# Patient Record
Sex: FEMALE | Race: ASIAN | NOT HISPANIC OR LATINO | Employment: OTHER | URBAN - METROPOLITAN AREA
[De-identification: names, ages, dates, MRNs, and addresses within clinical notes are randomized per-mention and may not be internally consistent; named-entity substitution may affect disease eponyms.]

---

## 2017-10-23 ENCOUNTER — OFFICE VISIT (OUTPATIENT)
Dept: MEDICAL GROUP | Facility: PHYSICIAN GROUP | Age: 77
End: 2017-10-23
Payer: MEDICARE

## 2017-10-23 VITALS
SYSTOLIC BLOOD PRESSURE: 126 MMHG | HEART RATE: 76 BPM | BODY MASS INDEX: 21.37 KG/M2 | WEIGHT: 95 LBS | OXYGEN SATURATION: 96 % | DIASTOLIC BLOOD PRESSURE: 78 MMHG | RESPIRATION RATE: 14 BRPM | TEMPERATURE: 97.2 F | HEIGHT: 56 IN

## 2017-10-23 DIAGNOSIS — Z13.1 ENCOUNTER FOR SCREENING EXAMINATION FOR IMPAIRED GLUCOSE REGULATION AND DIABETES MELLITUS: ICD-10-CM

## 2017-10-23 DIAGNOSIS — Z23 NEED FOR VACCINATION: ICD-10-CM

## 2017-10-23 DIAGNOSIS — Z12.39 SCREENING FOR BREAST CANCER: ICD-10-CM

## 2017-10-23 DIAGNOSIS — Z91.81 RISK FOR FALLS: ICD-10-CM

## 2017-10-23 DIAGNOSIS — R05.9 COUGH: ICD-10-CM

## 2017-10-23 DIAGNOSIS — Z13.220 SCREENING FOR LIPID DISORDERS: ICD-10-CM

## 2017-10-23 DIAGNOSIS — Z12.11 SCREEN FOR COLON CANCER: ICD-10-CM

## 2017-10-23 PROCEDURE — 90662 IIV NO PRSV INCREASED AG IM: CPT | Performed by: NURSE PRACTITIONER

## 2017-10-23 PROCEDURE — G0008 ADMIN INFLUENZA VIRUS VAC: HCPCS | Performed by: NURSE PRACTITIONER

## 2017-10-23 PROCEDURE — 99204 OFFICE O/P NEW MOD 45 MIN: CPT | Mod: 25 | Performed by: NURSE PRACTITIONER

## 2017-10-23 RX ORDER — FLUTICASONE PROPIONATE 50 MCG
1 SPRAY, SUSPENSION (ML) NASAL DAILY
Qty: 16 G | Refills: 2 | Status: SHIPPED | OUTPATIENT
Start: 2017-10-23 | End: 2017-10-23

## 2017-10-23 RX ORDER — LORATADINE 10 MG/1
10 TABLET ORAL DAILY
Qty: 30 TAB | Refills: 0 | Status: SHIPPED | OUTPATIENT
Start: 2017-10-23 | End: 2017-10-23

## 2017-10-23 RX ORDER — FLUTICASONE PROPIONATE 50 MCG
1 SPRAY, SUSPENSION (ML) NASAL DAILY
Qty: 16 G | Refills: 2 | Status: SHIPPED | OUTPATIENT
Start: 2017-10-23 | End: 2017-11-29

## 2017-10-23 RX ORDER — LORATADINE 10 MG/1
10 TABLET ORAL DAILY
Qty: 30 TAB | Refills: 0 | Status: SHIPPED | OUTPATIENT
Start: 2017-10-23 | End: 2017-11-29

## 2017-10-23 ASSESSMENT — PATIENT HEALTH QUESTIONNAIRE - PHQ9: CLINICAL INTERPRETATION OF PHQ2 SCORE: 0

## 2017-10-23 NOTE — PROGRESS NOTES
Chief Complaint   Patient presents with   • Establish Care   • Cough     since september       HISTORY OF THE PRESENT ILLNESS: This is a 77 y.o. female new patient to our practice. This pleasant patient is here today to discuss cough.    Cough  This is a new problem. Started with a cough in 2017. States cough is worse in the morning. Was seen by multiple specialist in the [facility]. No burning in her chest, no n/v, sometimes night sweats. When she got sick initially she had fever which resolved after 3 days. tmax was 38. Feels like phlegm is stuck. States the mucous is sometimes thin/thick but is noticing that it is grey in color. States breathing is improving denies wheezing, tightness in her chest, does notice SOB with walking long distances. Was diagnosed with allergic pharyngitis in [facility]. Cough syrup improves symptoms, nothing makes it worse.       Past Medical History:   Diagnosis Date   • Chronic cough    • Falls    • Knee pain, right        History reviewed. No pertinent surgical history.    Family Status   Relation Status   • Mother    • Father      Family History   Problem Relation Age of Onset   • Asthma Father        Social History   Substance Use Topics   • Smoking status: Never Smoker   • Smokeless tobacco: Never Used   • Alcohol use No       Allergies: Review of patient's allergies indicates no known allergies.    Current Outpatient Prescriptions Ordered in [facility]   Medication Sig Dispense Refill   • Dextromethorphan Polistirex (COUGH DM PO) Take  by mouth.     • fluticasone (FLONASE) 50 MCG/ACT nasal spray Spray 1 Spray in nose every day. 16 g 2   • loratadine (CLARITIN) 10 MG Tab Take 1 Tab by mouth every day. 30 Tab 0     No current Epic-ordered facility-administered medications on file.        Review of Systems   Constitutional:  Negative for fever, chills, weight loss and malaise/fatigue.   HENT:  Negative for ear pain, nosebleeds, congestion, sore throat and neck  "pain.    Eyes:  Negative for blurred vision.   Respiratory: Positive for cough, sputum production, negative shortness of breath and wheezing.    Cardiovascular:  Negative for chest pain, palpitations, orthopnea and leg swelling.   Gastrointestinal:  Negative for heartburn, nausea, vomiting and abdominal pain.   Genitourinary:  Negative for dysuria, urgency and frequency.   Musculoskeletal:  Negative for myalgias, back pain and joint pain.   Skin:  Negative for rash and itching.   Neurological:  Negative for dizziness, tingling, tremors, sensory change, focal weakness and headaches.   Endo/Heme/Allergies:  Does not bruise/bleed easily.   Psychiatric/Behavioral:  Negative for depression, anxiety, or memory loss.     All other systems reviewed and are negative except as in HPI.    Exam: Blood pressure 126/78, pulse 76, temperature 36.2 °C (97.2 °F), resp. rate 14, height 1.422 m (4' 8\"), weight 43.1 kg (95 lb), SpO2 96 %, not currently breastfeeding.  General:  Normal appearing. No distress.  HEENT:  Normocephalic. Eyes conjunctiva clear lids without ptosis, pupils equal and reactive to light accommodation, ears normal shape and contour, canals are clear bilaterally, tympanic membranes are benign, nasal mucosa benign, oropharynx is without erythema, edema or exudates.   Neck:  Supple. Thyroid is not enlarged.  Pulmonary:  Clear to ausculation.  Normal effort. No rales, ronchi, or wheezing.  Cardiovascular: Regular rate and rhythm without murmur. Carotid and radial pulses are intact and equal bilaterally.  Abdomen: Soft, nontender, nondistended. Normal bowel sounds. Liver and spleen are not palpable  Neurologic: Grossly nonfocal  Lymph:  No cervical, supraclavicular or axillary lymph nodes are palpable  Skin:  Warm and dry.  No obvious lesions.  Musculoskeletal:  Normal gait. No extremity cyanosis, clubbing, or edema.  Psych:  Normal mood and affect. Alert and oriented x3. Judgment and insight is normal.    PLAN:    1. " "Cough  States \"something\" was noted on a CT. Diagnosed with allergies in the [facility] plan to treat for this an re-check in 1 month.  - CBC WITH DIFFERENTIAL; Future  - Quantiferon Gold TB (PPD); Future    2. Screening for lipid disorders  - LIPID PROFILE; Future    3. Encounter for screening examination for impaired glucose regulation and diabetes mellitus  - COMP METABOLIC PANEL; Future    4. Risk for falls  - Patient identified as fall risk.  Appropriate orders and counseling given.    5. Need for vaccination  - INFLUENZA VACCINE, HIGH DOSE (65+ ONLY)    6. Screening for breast cancer  - MA-SCREEN MAMMO W/CAD-BILAT    7. Screen for colon cancer  - REFERRAL TO GI FOR COLONOSCOPY    Follow-up in one month for short, with myself or Dr. Freeman. Patient is encouraged to be seen in the emergency room for chest pain, palpitations, shortness of breath, dizziness, severe abdominal pain or other concerning symptoms.    Please note that this dictation was created using voice recognition software. I have made every reasonable attempt to correct obvious errors, but I expect that there are errors of grammar and possibly content that I did not discover before finalizing the note.      Assessment/Plan  1. Cough  CBC WITH DIFFERENTIAL    Quantiferon Gold TB (PPD)    DISCONTINUED: fluticasone (FLONASE) 50 MCG/ACT nasal spray    DISCONTINUED: loratadine (CLARITIN) 10 MG Tab   2. Screening for lipid disorders  LIPID PROFILE   3. Encounter for screening examination for impaired glucose regulation and diabetes mellitus  COMP METABOLIC PANEL   4. Risk for falls  Patient identified as fall risk.  Appropriate orders and counseling given.   5. Need for vaccination  INFLUENZA VACCINE, HIGH DOSE (65+ ONLY)   6. Screening for breast cancer  MA-SCREEN MAMMO W/CAD-BILAT   7. Screen for colon cancer  REFERRAL TO GI FOR COLONOSCOPY         I have placed the below orders and discussed them with an approved delegating provider. The MA is " performing the below orders under the direction of Dr. Freeman.

## 2017-10-23 NOTE — ASSESSMENT & PLAN NOTE
This is a new problem. Started with a cough in September 2017. States cough is worse in the morning. Was seen by multiple specialist in the [facility]. No burning in her chest, no n/v, sometimes night sweats. When she got sick initially she had fever which resolved after 3 days. tmax was 38. Feels like phlegm is stuck. States the mucous is sometimes thin/thick but is noticing that it is grey in color. States breathing is improving denies wheezing, tightness in her chest, does notice SOB with walking long distances. Was diagnosed with allergic pharyngitis in [facility]. Cough syrup improves symptoms, nothing makes it worse.

## 2017-11-11 ENCOUNTER — OFFICE VISIT (OUTPATIENT)
Dept: URGENT CARE | Facility: PHYSICIAN GROUP | Age: 77
End: 2017-11-11
Payer: MEDICARE

## 2017-11-11 ENCOUNTER — HOSPITAL ENCOUNTER (OUTPATIENT)
Dept: RADIOLOGY | Facility: MEDICAL CENTER | Age: 77
DRG: 853 | End: 2017-11-11
Attending: FAMILY MEDICINE
Payer: MEDICARE

## 2017-11-11 VITALS
HEIGHT: 56 IN | WEIGHT: 93 LBS | HEART RATE: 100 BPM | BODY MASS INDEX: 20.92 KG/M2 | SYSTOLIC BLOOD PRESSURE: 104 MMHG | OXYGEN SATURATION: 90 % | TEMPERATURE: 97.4 F | RESPIRATION RATE: 12 BRPM | DIASTOLIC BLOOD PRESSURE: 57 MMHG

## 2017-11-11 DIAGNOSIS — R05.9 COUGH: ICD-10-CM

## 2017-11-11 DIAGNOSIS — R06.02 SHORTNESS OF BREATH: ICD-10-CM

## 2017-11-11 DIAGNOSIS — J18.9 PNEUMONIA OF BOTH LUNGS DUE TO INFECTIOUS ORGANISM, UNSPECIFIED PART OF LUNG: ICD-10-CM

## 2017-11-11 PROCEDURE — 71020 DX-CHEST-2 VIEWS: CPT

## 2017-11-11 PROCEDURE — 99214 OFFICE O/P EST MOD 30 MIN: CPT | Performed by: FAMILY MEDICINE

## 2017-11-11 RX ORDER — DOXYCYCLINE HYCLATE 100 MG
100 TABLET ORAL 2 TIMES DAILY
Qty: 20 TAB | Refills: 0 | Status: ON HOLD | OUTPATIENT
Start: 2017-11-11 | End: 2017-11-24

## 2017-11-11 ASSESSMENT — ENCOUNTER SYMPTOMS
SORE THROAT: 0
CONSTIPATION: 1
SENSORY CHANGE: 0
BLOOD IN STOOL: 0
NAUSEA: 0
VOMITING: 0
MYALGIAS: 0
EYE DISCHARGE: 0
HEADACHES: 0
CHILLS: 0
DIARRHEA: 0
FEVER: 0
PALPITATIONS: 0
FOCAL WEAKNESS: 0
EYE REDNESS: 0
WEIGHT LOSS: 0

## 2017-11-11 ASSESSMENT — PAIN SCALES - GENERAL: PAINLEVEL: 8=MODERATE-SEVERE PAIN

## 2017-11-11 NOTE — PROGRESS NOTES
"Subjective:      Irina Conrad is a 77 y.o. female who presents with Cough (x5 day); Nasal Congestion (x5 days); Other (hard stool x5 days); and Chest Pain (chest pain due to hard coughing and flem)            5 days productive cough without blood in sputum. Subjective fever/chills. Some relief with OTC advil. +SOB. No wheeze. Mild CP with cough. No PMH pneumonia/asthma/copd. +nasal congestion. Sx's worse in am. No other aggravating or alleviating factors.          Review of Systems   Constitutional: Positive for malaise/fatigue. Negative for chills, fever and weight loss.   HENT: Negative for sore throat.    Eyes: Negative for discharge and redness.   Respiratory:        As above     Cardiovascular: Negative for palpitations and leg swelling.   Gastrointestinal: Positive for constipation. Negative for blood in stool, diarrhea, melena, nausea and vomiting.   Genitourinary: Negative for dysuria, frequency, hematuria and urgency.   Musculoskeletal: Negative for joint pain and myalgias.   Skin: Negative for itching and rash.   Neurological: Negative for sensory change, focal weakness and headaches.   .  Medications, Allergies, and current problem list reviewed today in [facility]  Past Medical History:   Diagnosis Date   • Chronic cough    • Falls    • Knee pain, right      Family History   Problem Relation Age of Onset   • Asthma Father      Social History   Substance Use Topics   • Smoking status: Never Smoker   • Smokeless tobacco: Never Used   • Alcohol use No          Objective:     /57   Pulse 100   Temp 36.3 °C (97.4 °F)   Resp 12   Ht 1.422 m (4' 8\")   Wt 42.2 kg (93 lb)   SpO2 90%   Breastfeeding? No   BMI 20.85 kg/m²      Physical Exam   Constitutional: She appears well-developed and well-nourished. No distress.   HENT:   Head: Normocephalic and atraumatic.   Mouth/Throat: Oropharynx is clear and moist.   Eyes: Conjunctivae are normal.   Cardiovascular: Normal rate, regular rhythm and normal heart " sounds.    No murmur heard.  Pulmonary/Chest: Effort normal. She has no wheezes. She has rales (throughout).   Abdominal: Soft. Bowel sounds are normal. There is no tenderness.   Neurological:   Speech is clear. Patient is appropriate and cooperative.     Skin: Skin is warm and dry. No rash noted.               Assessment/Plan:     Pulse ox adequate but borderline  cxr per rad:  1.  Patchy interstitial and alveolar opacities in both lungs, likely inflammatory.  2.  Focal opacity RIGHT lung apex may represent mass or focal inflammatory process.  3.  Tuberculosis is not excluded.    1. Cough  DX-CHEST-2 VIEWS   2. Shortness of breath  DX-CHEST-2 VIEWS   3. Pneumonia of both lungs due to infectious organism, unspecified part of lung  doxycycline (VIBRAMYCIN) 100 MG Tab     Discussed inpatient vs outpatient treatment and risk with patient and family. I offered to transfer to hospital however they prefer trial of outpatient treatment first.     Differential diagnosis, natural history, supportive care, and indications for immediate follow-up discussed at length.     She had sputum testing negative for TB in [facility] 9/2017. She has had no exposures and I will treat this as CAP and not a TB at this point.

## 2017-11-13 ENCOUNTER — HOSPITAL ENCOUNTER (INPATIENT)
Facility: MEDICAL CENTER | Age: 77
LOS: 11 days | DRG: 853 | End: 2017-11-24
Attending: EMERGENCY MEDICINE | Admitting: INTERNAL MEDICINE
Payer: MEDICARE

## 2017-11-13 ENCOUNTER — RESOLUTE PROFESSIONAL BILLING HOSPITAL PROF FEE (OUTPATIENT)
Dept: HOSPITALIST | Facility: MEDICAL CENTER | Age: 77
End: 2017-11-13
Payer: MEDICARE

## 2017-11-13 ENCOUNTER — APPOINTMENT (OUTPATIENT)
Dept: RADIOLOGY | Facility: MEDICAL CENTER | Age: 77
DRG: 853 | End: 2017-11-13
Payer: MEDICARE

## 2017-11-13 ENCOUNTER — APPOINTMENT (OUTPATIENT)
Dept: RADIOLOGY | Facility: MEDICAL CENTER | Age: 77
DRG: 853 | End: 2017-11-13
Attending: EMERGENCY MEDICINE
Payer: MEDICARE

## 2017-11-13 DIAGNOSIS — J18.9 PNEUMONIA DUE TO INFECTIOUS ORGANISM, UNSPECIFIED LATERALITY, UNSPECIFIED PART OF LUNG: ICD-10-CM

## 2017-11-13 PROBLEM — A41.9 SEPSIS (HCC): Status: ACTIVE | Noted: 2017-11-13

## 2017-11-13 LAB
ALBUMIN SERPL BCP-MCNC: 3.6 G/DL (ref 3.2–4.9)
ALBUMIN/GLOB SERPL: 0.8 G/DL
ALP SERPL-CCNC: 149 U/L (ref 30–99)
ALT SERPL-CCNC: 36 U/L (ref 2–50)
ANION GAP SERPL CALC-SCNC: 12 MMOL/L (ref 0–11.9)
AST SERPL-CCNC: 30 U/L (ref 12–45)
BASOPHILS # BLD AUTO: 0.2 % (ref 0–1.8)
BASOPHILS # BLD: 0.05 K/UL (ref 0–0.12)
BILIRUB SERPL-MCNC: 0.4 MG/DL (ref 0.1–1.5)
BUN SERPL-MCNC: 23 MG/DL (ref 8–22)
CALCIUM SERPL-MCNC: 9.6 MG/DL (ref 8.5–10.5)
CHLORIDE SERPL-SCNC: 96 MMOL/L (ref 96–112)
CO2 SERPL-SCNC: 23 MMOL/L (ref 20–33)
CREAT SERPL-MCNC: 0.99 MG/DL (ref 0.5–1.4)
EOSINOPHIL # BLD AUTO: 0.19 K/UL (ref 0–0.51)
EOSINOPHIL NFR BLD: 0.9 % (ref 0–6.9)
ERYTHROCYTE [DISTWIDTH] IN BLOOD BY AUTOMATED COUNT: 42.2 FL (ref 35.9–50)
GFR SERPL CREATININE-BSD FRML MDRD: 55 ML/MIN/1.73 M 2
GLOBULIN SER CALC-MCNC: 4.7 G/DL (ref 1.9–3.5)
GLUCOSE SERPL-MCNC: 142 MG/DL (ref 65–99)
HCT VFR BLD AUTO: 33.2 % (ref 37–47)
HGB BLD-MCNC: 11 G/DL (ref 12–16)
IMM GRANULOCYTES # BLD AUTO: 0.15 K/UL (ref 0–0.11)
IMM GRANULOCYTES NFR BLD AUTO: 0.7 % (ref 0–0.9)
LACTATE BLD-SCNC: 1.8 MMOL/L (ref 0.5–2)
LACTATE BLD-SCNC: 2.2 MMOL/L (ref 0.5–2)
LIPASE SERPL-CCNC: 24 U/L (ref 11–82)
LYMPHOCYTES # BLD AUTO: 1.55 K/UL (ref 1–4.8)
LYMPHOCYTES NFR BLD: 7.7 % (ref 22–41)
MCH RBC QN AUTO: 30.1 PG (ref 27–33)
MCHC RBC AUTO-ENTMCNC: 33.1 G/DL (ref 33.6–35)
MCV RBC AUTO: 90.7 FL (ref 81.4–97.8)
MONOCYTES # BLD AUTO: 1.07 K/UL (ref 0–0.85)
MONOCYTES NFR BLD AUTO: 5.3 % (ref 0–13.4)
NEUTROPHILS # BLD AUTO: 17.16 K/UL (ref 2–7.15)
NEUTROPHILS NFR BLD: 85.2 % (ref 44–72)
NRBC # BLD AUTO: 0 K/UL
NRBC BLD AUTO-RTO: 0 /100 WBC
PLATELET # BLD AUTO: 617 K/UL (ref 164–446)
PMV BLD AUTO: 8.7 FL (ref 9–12.9)
POTASSIUM SERPL-SCNC: 3.4 MMOL/L (ref 3.6–5.5)
PROT SERPL-MCNC: 8.3 G/DL (ref 6–8.2)
RBC # BLD AUTO: 3.66 M/UL (ref 4.2–5.4)
SODIUM SERPL-SCNC: 131 MMOL/L (ref 135–145)
WBC # BLD AUTO: 20.2 K/UL (ref 4.8–10.8)

## 2017-11-13 PROCEDURE — 96375 TX/PRO/DX INJ NEW DRUG ADDON: CPT

## 2017-11-13 PROCEDURE — 80053 COMPREHEN METABOLIC PANEL: CPT

## 2017-11-13 PROCEDURE — 71010 DX-CHEST-PORTABLE (1 VIEW): CPT

## 2017-11-13 PROCEDURE — 770027 HCHG ROOM/CARE - NEGATIVE PRESSURE ISOLATION

## 2017-11-13 PROCEDURE — 85025 COMPLETE CBC W/AUTO DIFF WBC: CPT

## 2017-11-13 PROCEDURE — 96365 THER/PROPH/DIAG IV INF INIT: CPT

## 2017-11-13 PROCEDURE — 99223 1ST HOSP IP/OBS HIGH 75: CPT | Mod: AI | Performed by: INTERNAL MEDICINE

## 2017-11-13 PROCEDURE — 83605 ASSAY OF LACTIC ACID: CPT

## 2017-11-13 PROCEDURE — 700111 HCHG RX REV CODE 636 W/ 250 OVERRIDE (IP): Performed by: EMERGENCY MEDICINE

## 2017-11-13 PROCEDURE — 36415 COLL VENOUS BLD VENIPUNCTURE: CPT

## 2017-11-13 PROCEDURE — 87040 BLOOD CULTURE FOR BACTERIA: CPT | Mod: 91

## 2017-11-13 PROCEDURE — 700105 HCHG RX REV CODE 258: Performed by: EMERGENCY MEDICINE

## 2017-11-13 PROCEDURE — 83690 ASSAY OF LIPASE: CPT

## 2017-11-13 PROCEDURE — 99285 EMERGENCY DEPT VISIT HI MDM: CPT

## 2017-11-13 RX ORDER — ACETAMINOPHEN 325 MG/1
650 TABLET ORAL EVERY 6 HOURS PRN
Status: DISCONTINUED | OUTPATIENT
Start: 2017-11-13 | End: 2017-11-24 | Stop reason: HOSPADM

## 2017-11-13 RX ORDER — CEFTRIAXONE 2 G/1
2 INJECTION, POWDER, FOR SOLUTION INTRAMUSCULAR; INTRAVENOUS ONCE
Status: COMPLETED | OUTPATIENT
Start: 2017-11-13 | End: 2017-11-13

## 2017-11-13 RX ORDER — SODIUM CHLORIDE AND POTASSIUM CHLORIDE 150; 900 MG/100ML; MG/100ML
INJECTION, SOLUTION INTRAVENOUS CONTINUOUS
Status: DISCONTINUED | OUTPATIENT
Start: 2017-11-13 | End: 2017-11-18

## 2017-11-13 RX ORDER — ONDANSETRON 4 MG/1
4 TABLET, ORALLY DISINTEGRATING ORAL EVERY 4 HOURS PRN
Status: DISCONTINUED | OUTPATIENT
Start: 2017-11-13 | End: 2017-11-24 | Stop reason: HOSPADM

## 2017-11-13 RX ORDER — SODIUM CHLORIDE 9 MG/ML
30 INJECTION, SOLUTION INTRAVENOUS ONCE
Status: COMPLETED | OUTPATIENT
Start: 2017-11-13 | End: 2017-11-13

## 2017-11-13 RX ORDER — SODIUM CHLORIDE 9 MG/ML
500 INJECTION, SOLUTION INTRAVENOUS
Status: DISCONTINUED | OUTPATIENT
Start: 2017-11-13 | End: 2017-11-24 | Stop reason: HOSPADM

## 2017-11-13 RX ORDER — SUCRALFATE 1 G/1
1 TABLET ORAL EVERY 6 HOURS
Status: DISCONTINUED | OUTPATIENT
Start: 2017-11-14 | End: 2017-11-24 | Stop reason: HOSPADM

## 2017-11-13 RX ORDER — AMOXICILLIN 250 MG
2 CAPSULE ORAL 2 TIMES DAILY
Status: DISCONTINUED | OUTPATIENT
Start: 2017-11-13 | End: 2017-11-24 | Stop reason: HOSPADM

## 2017-11-13 RX ORDER — ONDANSETRON 2 MG/ML
INJECTION INTRAMUSCULAR; INTRAVENOUS
Status: COMPLETED
Start: 2017-11-13 | End: 2017-11-13

## 2017-11-13 RX ORDER — AZITHROMYCIN 500 MG/1
500 INJECTION, POWDER, LYOPHILIZED, FOR SOLUTION INTRAVENOUS ONCE
Status: COMPLETED | OUTPATIENT
Start: 2017-11-13 | End: 2017-11-13

## 2017-11-13 RX ORDER — ONDANSETRON 2 MG/ML
4 INJECTION INTRAMUSCULAR; INTRAVENOUS ONCE
Status: COMPLETED | OUTPATIENT
Start: 2017-11-13 | End: 2017-11-13

## 2017-11-13 RX ORDER — MORPHINE SULFATE 4 MG/ML
INJECTION, SOLUTION INTRAMUSCULAR; INTRAVENOUS
Status: COMPLETED
Start: 2017-11-13 | End: 2017-11-13

## 2017-11-13 RX ORDER — POLYETHYLENE GLYCOL 3350 17 G/17G
1 POWDER, FOR SOLUTION ORAL
Status: DISCONTINUED | OUTPATIENT
Start: 2017-11-13 | End: 2017-11-24 | Stop reason: HOSPADM

## 2017-11-13 RX ORDER — LORATADINE 10 MG/1
10 TABLET ORAL DAILY
Status: DISCONTINUED | OUTPATIENT
Start: 2017-11-14 | End: 2017-11-24 | Stop reason: HOSPADM

## 2017-11-13 RX ORDER — MORPHINE SULFATE 4 MG/ML
4 INJECTION, SOLUTION INTRAMUSCULAR; INTRAVENOUS ONCE
Status: COMPLETED | OUTPATIENT
Start: 2017-11-13 | End: 2017-11-13

## 2017-11-13 RX ORDER — LABETALOL HYDROCHLORIDE 5 MG/ML
10 INJECTION, SOLUTION INTRAVENOUS EVERY 4 HOURS PRN
Status: DISCONTINUED | OUTPATIENT
Start: 2017-11-13 | End: 2017-11-24 | Stop reason: HOSPADM

## 2017-11-13 RX ORDER — OMEPRAZOLE 20 MG/1
20 CAPSULE, DELAYED RELEASE ORAL DAILY
Status: DISCONTINUED | OUTPATIENT
Start: 2017-11-14 | End: 2017-11-14

## 2017-11-13 RX ORDER — SODIUM CHLORIDE 9 MG/ML
30 INJECTION, SOLUTION INTRAVENOUS
Status: DISCONTINUED | OUTPATIENT
Start: 2017-11-13 | End: 2017-11-13

## 2017-11-13 RX ORDER — FLUTICASONE PROPIONATE 50 MCG
1 SPRAY, SUSPENSION (ML) NASAL DAILY
Status: DISCONTINUED | OUTPATIENT
Start: 2017-11-14 | End: 2017-11-24 | Stop reason: HOSPADM

## 2017-11-13 RX ORDER — ONDANSETRON 2 MG/ML
4 INJECTION INTRAMUSCULAR; INTRAVENOUS EVERY 4 HOURS PRN
Status: DISCONTINUED | OUTPATIENT
Start: 2017-11-13 | End: 2017-11-24 | Stop reason: HOSPADM

## 2017-11-13 RX ORDER — BISACODYL 10 MG
10 SUPPOSITORY, RECTAL RECTAL
Status: DISCONTINUED | OUTPATIENT
Start: 2017-11-13 | End: 2017-11-24 | Stop reason: HOSPADM

## 2017-11-13 RX ADMIN — MORPHINE SULFATE 4 MG: 4 INJECTION INTRAVENOUS at 21:30

## 2017-11-13 RX ADMIN — CEFTRIAXONE SODIUM 2 G: 2 INJECTION, POWDER, FOR SOLUTION INTRAMUSCULAR; INTRAVENOUS at 20:35

## 2017-11-13 RX ADMIN — ONDANSETRON 4 MG: 2 INJECTION INTRAMUSCULAR; INTRAVENOUS at 21:30

## 2017-11-13 RX ADMIN — AZITHROMYCIN MONOHYDRATE 500 MG: 500 INJECTION, POWDER, LYOPHILIZED, FOR SOLUTION INTRAVENOUS at 20:35

## 2017-11-13 RX ADMIN — SODIUM CHLORIDE 1302 ML: 9 INJECTION, SOLUTION INTRAVENOUS at 20:35

## 2017-11-13 ASSESSMENT — PAIN SCALES - GENERAL
PAINLEVEL_OUTOF10: 4
PAINLEVEL_OUTOF10: 0

## 2017-11-13 ASSESSMENT — COPD QUESTIONNAIRES
DO YOU EVER COUGH UP ANY MUCUS OR PHLEGM?: NO/ONLY WITH OCCASIONAL COLDS OR INFECTIONS
HAVE YOU SMOKED AT LEAST 100 CIGARETTES IN YOUR ENTIRE LIFE: NO/DON'T KNOW
DURING THE PAST 4 WEEKS HOW MUCH DID YOU FEEL SHORT OF BREATH: NONE/LITTLE OF THE TIME
COPD SCREENING SCORE: 2

## 2017-11-13 ASSESSMENT — LIFESTYLE VARIABLES: EVER_SMOKED: NEVER

## 2017-11-14 PROBLEM — D64.9 ANEMIA: Status: ACTIVE | Noted: 2017-11-14

## 2017-11-14 LAB
ANION GAP SERPL CALC-SCNC: 6 MMOL/L (ref 0–11.9)
APPEARANCE UR: CLEAR
BACTERIA #/AREA URNS HPF: NEGATIVE /HPF
BASOPHILS # BLD AUTO: 0.2 % (ref 0–1.8)
BASOPHILS # BLD: 0.04 K/UL (ref 0–0.12)
BILIRUB UR QL STRIP.AUTO: NEGATIVE
BUN SERPL-MCNC: 16 MG/DL (ref 8–22)
CALCIUM SERPL-MCNC: 8.2 MG/DL (ref 8.5–10.5)
CHLORIDE SERPL-SCNC: 105 MMOL/L (ref 96–112)
CO2 SERPL-SCNC: 24 MMOL/L (ref 20–33)
COLOR UR: YELLOW
CREAT SERPL-MCNC: 0.65 MG/DL (ref 0.5–1.4)
EOSINOPHIL # BLD AUTO: 0.11 K/UL (ref 0–0.51)
EOSINOPHIL NFR BLD: 0.6 % (ref 0–6.9)
EPI CELLS #/AREA URNS HPF: ABNORMAL /HPF
ERYTHROCYTE [DISTWIDTH] IN BLOOD BY AUTOMATED COUNT: 42.3 FL (ref 35.9–50)
FERRITIN SERPL-MCNC: 195.8 NG/ML (ref 10–291)
GFR SERPL CREATININE-BSD FRML MDRD: >60 ML/MIN/1.73 M 2
GLUCOSE SERPL-MCNC: 105 MG/DL (ref 65–99)
GLUCOSE UR STRIP.AUTO-MCNC: NEGATIVE MG/DL
HCT VFR BLD AUTO: 27.6 % (ref 37–47)
HGB BLD-MCNC: 9 G/DL (ref 12–16)
HYALINE CASTS #/AREA URNS LPF: ABNORMAL /LPF
IMM GRANULOCYTES # BLD AUTO: 0.18 K/UL (ref 0–0.11)
IMM GRANULOCYTES NFR BLD AUTO: 1 % (ref 0–0.9)
IRON SATN MFR SERPL: 6 % (ref 15–55)
IRON SERPL-MCNC: 13 UG/DL (ref 40–170)
KETONES UR STRIP.AUTO-MCNC: NEGATIVE MG/DL
LEUKOCYTE ESTERASE UR QL STRIP.AUTO: ABNORMAL
LYMPHOCYTES # BLD AUTO: 1.94 K/UL (ref 1–4.8)
LYMPHOCYTES NFR BLD: 10.6 % (ref 22–41)
MAGNESIUM SERPL-MCNC: 1.6 MG/DL (ref 1.5–2.5)
MCH RBC QN AUTO: 29.8 PG (ref 27–33)
MCHC RBC AUTO-ENTMCNC: 32.6 G/DL (ref 33.6–35)
MCV RBC AUTO: 91.4 FL (ref 81.4–97.8)
MICRO URNS: ABNORMAL
MONOCYTES # BLD AUTO: 1.02 K/UL (ref 0–0.85)
MONOCYTES NFR BLD AUTO: 5.6 % (ref 0–13.4)
NEUTROPHILS # BLD AUTO: 14.95 K/UL (ref 2–7.15)
NEUTROPHILS NFR BLD: 82 % (ref 44–72)
NITRITE UR QL STRIP.AUTO: NEGATIVE
NRBC # BLD AUTO: 0 K/UL
NRBC BLD AUTO-RTO: 0 /100 WBC
PH UR STRIP.AUTO: 5.5 [PH]
PLATELET # BLD AUTO: 502 K/UL (ref 164–446)
PMV BLD AUTO: 9.1 FL (ref 9–12.9)
POTASSIUM SERPL-SCNC: 3.9 MMOL/L (ref 3.6–5.5)
PROCALCITONIN SERPL-MCNC: 0.31 NG/ML
PROT UR QL STRIP: NEGATIVE MG/DL
RBC # BLD AUTO: 3.02 M/UL (ref 4.2–5.4)
RBC # URNS HPF: ABNORMAL /HPF
RBC UR QL AUTO: ABNORMAL
SODIUM SERPL-SCNC: 135 MMOL/L (ref 135–145)
SP GR UR STRIP.AUTO: 1.01
TIBC SERPL-MCNC: 217 UG/DL (ref 250–450)
UROBILINOGEN UR STRIP.AUTO-MCNC: 0.2 MG/DL
WBC # BLD AUTO: 18.2 K/UL (ref 4.8–10.8)
WBC #/AREA URNS HPF: ABNORMAL /HPF

## 2017-11-14 PROCEDURE — 700111 HCHG RX REV CODE 636 W/ 250 OVERRIDE (IP): Performed by: HOSPITALIST

## 2017-11-14 PROCEDURE — 85025 COMPLETE CBC W/AUTO DIFF WBC: CPT

## 2017-11-14 PROCEDURE — 87086 URINE CULTURE/COLONY COUNT: CPT

## 2017-11-14 PROCEDURE — 87070 CULTURE OTHR SPECIMN AEROBIC: CPT

## 2017-11-14 PROCEDURE — A9270 NON-COVERED ITEM OR SERVICE: HCPCS | Performed by: INTERNAL MEDICINE

## 2017-11-14 PROCEDURE — 700111 HCHG RX REV CODE 636 W/ 250 OVERRIDE (IP): Performed by: INTERNAL MEDICINE

## 2017-11-14 PROCEDURE — 700101 HCHG RX REV CODE 250: Performed by: INTERNAL MEDICINE

## 2017-11-14 PROCEDURE — 99232 SBSQ HOSP IP/OBS MODERATE 35: CPT | Performed by: HOSPITALIST

## 2017-11-14 PROCEDURE — 87116 MYCOBACTERIA CULTURE: CPT

## 2017-11-14 PROCEDURE — 84145 PROCALCITONIN (PCT): CPT

## 2017-11-14 PROCEDURE — 83550 IRON BINDING TEST: CPT

## 2017-11-14 PROCEDURE — 770027 HCHG ROOM/CARE - NEGATIVE PRESSURE ISOLATION

## 2017-11-14 PROCEDURE — 86480 TB TEST CELL IMMUN MEASURE: CPT

## 2017-11-14 PROCEDURE — 700101 HCHG RX REV CODE 250: Performed by: HOSPITALIST

## 2017-11-14 PROCEDURE — 83540 ASSAY OF IRON: CPT

## 2017-11-14 PROCEDURE — 87205 SMEAR GRAM STAIN: CPT

## 2017-11-14 PROCEDURE — 83735 ASSAY OF MAGNESIUM: CPT

## 2017-11-14 PROCEDURE — 80048 BASIC METABOLIC PNL TOTAL CA: CPT

## 2017-11-14 PROCEDURE — 36415 COLL VENOUS BLD VENIPUNCTURE: CPT

## 2017-11-14 PROCEDURE — 81001 URINALYSIS AUTO W/SCOPE: CPT

## 2017-11-14 PROCEDURE — 700102 HCHG RX REV CODE 250 W/ 637 OVERRIDE(OP): Performed by: HOSPITALIST

## 2017-11-14 PROCEDURE — 87015 SPECIMEN INFECT AGNT CONCNTJ: CPT

## 2017-11-14 PROCEDURE — 700102 HCHG RX REV CODE 250 W/ 637 OVERRIDE(OP): Performed by: INTERNAL MEDICINE

## 2017-11-14 PROCEDURE — 87206 SMEAR FLUORESCENT/ACID STAI: CPT

## 2017-11-14 PROCEDURE — A9270 NON-COVERED ITEM OR SERVICE: HCPCS | Performed by: HOSPITALIST

## 2017-11-14 PROCEDURE — 82728 ASSAY OF FERRITIN: CPT

## 2017-11-14 RX ORDER — AZITHROMYCIN 250 MG/1
250 TABLET, FILM COATED ORAL DAILY
Status: COMPLETED | OUTPATIENT
Start: 2017-11-14 | End: 2017-11-17

## 2017-11-14 RX ORDER — FAMOTIDINE 20 MG/1
20 TABLET, FILM COATED ORAL 2 TIMES DAILY
Status: DISCONTINUED | OUTPATIENT
Start: 2017-11-14 | End: 2017-11-24 | Stop reason: HOSPADM

## 2017-11-14 RX ORDER — POLYETHYLENE GLYCOL 3350 17 G/17G
1 POWDER, FOR SOLUTION ORAL DAILY
Status: DISCONTINUED | OUTPATIENT
Start: 2017-11-14 | End: 2017-11-24 | Stop reason: HOSPADM

## 2017-11-14 RX ADMIN — OMEPRAZOLE 20 MG: 20 CAPSULE, DELAYED RELEASE ORAL at 08:45

## 2017-11-14 RX ADMIN — POTASSIUM CHLORIDE AND SODIUM CHLORIDE: 900; 150 INJECTION, SOLUTION INTRAVENOUS at 08:45

## 2017-11-14 RX ADMIN — AZITHROMYCIN 250 MG: 250 TABLET, FILM COATED ORAL at 16:36

## 2017-11-14 RX ADMIN — ENOXAPARIN SODIUM 40 MG: 100 INJECTION SUBCUTANEOUS at 08:45

## 2017-11-14 RX ADMIN — SUCRALFATE 1 G: 1 TABLET ORAL at 17:18

## 2017-11-14 RX ADMIN — POTASSIUM CHLORIDE AND SODIUM CHLORIDE: 900; 150 INJECTION, SOLUTION INTRAVENOUS at 17:24

## 2017-11-14 RX ADMIN — SUCRALFATE 1 G: 1 TABLET ORAL at 00:16

## 2017-11-14 RX ADMIN — SUCRALFATE 1 G: 1 TABLET ORAL at 05:25

## 2017-11-14 RX ADMIN — LORATADINE 10 MG: 10 TABLET ORAL at 08:45

## 2017-11-14 RX ADMIN — FAMOTIDINE 20 MG: 20 TABLET, FILM COATED ORAL at 21:28

## 2017-11-14 RX ADMIN — STANDARDIZED SENNA CONCENTRATE AND DOCUSATE SODIUM 2 TABLET: 8.6; 5 TABLET, FILM COATED ORAL at 11:53

## 2017-11-14 RX ADMIN — CEFTRIAXONE 1 G: 1 INJECTION, SOLUTION INTRAVENOUS at 21:29

## 2017-11-14 RX ADMIN — SUCRALFATE 1 G: 1 TABLET ORAL at 11:53

## 2017-11-14 RX ADMIN — STANDARDIZED SENNA CONCENTRATE AND DOCUSATE SODIUM 2 TABLET: 8.6; 5 TABLET, FILM COATED ORAL at 21:29

## 2017-11-14 RX ADMIN — POTASSIUM CHLORIDE AND SODIUM CHLORIDE: 900; 150 INJECTION, SOLUTION INTRAVENOUS at 00:17

## 2017-11-14 RX ADMIN — FAMOTIDINE 20 MG: 20 TABLET, FILM COATED ORAL at 11:53

## 2017-11-14 RX ADMIN — POLYETHYLENE GLYCOL 3350 1 PACKET: 17 POWDER, FOR SOLUTION ORAL at 11:53

## 2017-11-14 RX ADMIN — SUCRALFATE 1 G: 1 TABLET ORAL at 23:33

## 2017-11-14 RX ADMIN — ACETAMINOPHEN 650 MG: 325 TABLET, FILM COATED ORAL at 23:33

## 2017-11-14 ASSESSMENT — PATIENT HEALTH QUESTIONNAIRE - PHQ9
SUM OF ALL RESPONSES TO PHQ QUESTIONS 1-9: 0
1. LITTLE INTEREST OR PLEASURE IN DOING THINGS: NOT AT ALL
SUM OF ALL RESPONSES TO PHQ9 QUESTIONS 1 AND 2: 0
2. FEELING DOWN, DEPRESSED, IRRITABLE, OR HOPELESS: NOT AT ALL

## 2017-11-14 ASSESSMENT — COGNITIVE AND FUNCTIONAL STATUS - GENERAL
DRESSING REGULAR LOWER BODY CLOTHING: A LITTLE
SUGGESTED CMS G CODE MODIFIER DAILY ACTIVITY: CK
WALKING IN HOSPITAL ROOM: A LITTLE
SUGGESTED CMS G CODE MODIFIER MOBILITY: CJ
DRESSING REGULAR UPPER BODY CLOTHING: A LITTLE
PERSONAL GROOMING: A LITTLE
MOBILITY SCORE: 21
STANDING UP FROM CHAIR USING ARMS: A LITTLE
CLIMB 3 TO 5 STEPS WITH RAILING: A LITTLE
DAILY ACTIVITIY SCORE: 18
HELP NEEDED FOR BATHING: A LITTLE
TOILETING: A LITTLE
EATING MEALS: A LITTLE

## 2017-11-14 ASSESSMENT — ENCOUNTER SYMPTOMS
HEARTBURN: 0
NECK PAIN: 0
WEAKNESS: 0
NAUSEA: 1
DEPRESSION: 0
CARDIOVASCULAR NEGATIVE: 1
ABDOMINAL PAIN: 1
BRUISES/BLEEDS EASILY: 0
PALPITATIONS: 0
FOCAL WEAKNESS: 0
FEVER: 1
BACK PAIN: 0
POLYDIPSIA: 0
COUGH: 1
EYES NEGATIVE: 1
NEUROLOGICAL NEGATIVE: 1
MUSCULOSKELETAL NEGATIVE: 1
PSYCHIATRIC NEGATIVE: 1
DIZZINESS: 0
SPUTUM PRODUCTION: 1
SHORTNESS OF BREATH: 1
HEADACHES: 0
VOMITING: 0

## 2017-11-14 ASSESSMENT — PAIN SCALES - GENERAL
PAINLEVEL_OUTOF10: 0

## 2017-11-14 ASSESSMENT — LIFESTYLE VARIABLES
ALCOHOL_USE: NO
EVER_SMOKED: NEVER

## 2017-11-14 NOTE — PROGRESS NOTES
Report received from NOELLE Hanson.   Pt. Arrived to floor at approximately 2345 via [facility]   Telemetry monitor in place and monitor room notified. Pt. Currently running   Pt. Orders and charts reviewed  Call light within reach, bed locked and in lowest position.   Pt. is AOx4, with daughter at bedside and states no further needs at this time.

## 2017-11-14 NOTE — H&P
CHIEF COMPLAINT:  Cough, shortness breath, fever.    HISTORY OF PRESENT ILLNESS:  This is a 77-year-old female without much past   medical history except for allergies.  She was vacationing in the [facility]   from 05/2017 to 10/2017, and in September while in the [facility], she started to feel ill with   intermittent low grade fever and chills, along with dry cough.  She also   started to notice intermittent shortness of breath.  She denied any night   sweats.  She sought medical attention there, where chest x-ray and sputum AFB   were obtained, which were reportedly both negative.  Her symptoms were felt to be   allergies and she was started on antihistamines.  [place] came back to the [place] in   [place], but she continued to have above-mentioned symptoms, with intermittent   fevers and chills, with cough becoming productive of white to greenish phlegm   without any blood.  She also continued to feel short of breath, but not any   worse than when it first started.  She also complained of chest pain with   coughing.  She denied any nausea, vomiting, diarrhea, or leg edema.  She did   have intermittent epigastric pain.  During that time, her appetite was down,   and she was not eating or drinking much.    She was brought by her daughter to an urgent care center last Saturday, and   chest x-ray apparently showed pneumonia.  She was started on oral doxycycline.    However, her symptoms persisted, and she felt more fatigued prompting her daughter to   bring her to the ED for further evaluation and management.    EMERGENCY DEPARTMENT COURSE:  The patient was initially evaluated in the   emergency department, was maintaining good hemodynamics, but tachycardic,   saturating well on room air, and was afebrile.  Initial blood workup showed   WBC count of 20,200, with a platelet count of 617.  BMP was notable for sodium   of 131 and potassium 3.4, with alkaline phosphatase of 149 and lactate of   2.2.  Liver function test was  normal.  Chest x-ray (my read) showed patchy   bilateral pulmonary opacifications most prominent in the right apex.  Patient   was given normal saline bolus, and repeat lactate improved to 1.8.  She was   given ceftriaxone and azithromycin in the ED.  She was subsequently admitted   to the hospitalist service.    REVIEW OF SYSTEMS  A complete review of system was done. All other systems were negative.    PMH/PSH/FMH: I personally reviewed all ancillary histories as noted.    PAST MEDICAL HISTORY:  Past Medical History:   Diagnosis Date   • Chronic cough    • Falls    • Knee pain, right        PAST SURGICAL HISTORY:  No past surgical history on file.    PERSONAL/SOCIAL HISTORY:  Social History   Substance Use Topics   • Smoking status: Never Smoker   • Smokeless tobacco: Never Used   • Alcohol use No       FAMILY MEDICAL HISTORY:  Family History   Problem Relation Age of Onset   • Asthma Father        ALLERGIES:  Review of patient's allergies indicates no known allergies.    HOME MEDICATIONS:  Prior to Admission medications    Medication Sig Start Date End Date Taking? Authorizing Provider   doxycycline (VIBRAMYCIN) 100 MG Tab Take 1 Tab by mouth 2 times a day for 10 days. 11/11/17 11/21/17  Dami Link M.D.   fluticasone (FLONASE) 50 MCG/ACT nasal spray Spray 1 Spray in nose every day. 10/23/17   Jose Najera, A.P.R.N.   loratadine (CLARITIN) 10 MG Tab Take 1 Tab by mouth every day. 10/23/17   Jose Najera, A.P.R.N.           PHYSICAL EXAMINATION:  VITAL SIGNS:  Blood pressure 136/71, heart rate 101, respiratory rate 16,   oxygen saturation 94% on room air and temperature 37.3 degrees Celsius.  CONSTITUTIONAL: (-) diaphoresis, (-) distress  HENT:  Dry lips and oral mucosa.  No tonsillopharyngeal congestion or exudates.  EYES: PERRLA, pink conjuctivae, (-) icteric sclerae  NECK: (-) cervical lymphadenopathy, (-) neck rigidity  RESPIRATORY:  Diminished air entry, bilateral lung bases, with  occasional   subtle crackles bilaterally.  CARDIOVASCULAR:  Tachycardic, regular rhythm.  No murmurs, rubs, or gallops.    (+) Trace lower extremity edema bilaterally.  GASTROINTESTINAL:  Normoactive bowel sounds, soft, (+) slight epigastric   tenderness without any guarding or rebound.  No peritoneal signs.  MUSCULOSKELETAL: (-) joint swelling/tenderness, (-) joint deformities, (-) muscle tenderness,   (-) gross limitation of movement of 4 extremities  SKIN: (-) erythema, warmth, rashes, ulcers, open wounds  PSYCHIATRIC: mood, affect, and thought content WNL, behavior age appropriate  NEUROLOGIC: Non-focal, moves all 4 extremities, sensory grossly intact      PERTINENT DIAGNOSTIC RESULTS:  Reviewed, and as mentioned above. Please refer to ED course.      ASSESSMENT:  1.  Sepsis secondary to pneumonia, failed outpatient antibiotics.  2.  Bilateral lung infiltrates, likely community-acquired pneumonia, rule out   pulmonary tuberculosis with travel risk factors.  3.  Hypokalemia.  4.  Thrombocytosis, likely reactive.  5.  Epigastric pain/tenderness, suspect gastritis.  6.  Hypovolemic hyponatremia.    PLAN:  ---  I will admit her to the telemetry unit.  I anticipate that she will need   at least 2 midnights hospital stay to provide medically necessary services.  ---  I will continue her on antibiotics for community-acquired pneumonia with   IV ceftriaxone and azithromycin.  I will send for procalcitonin and trend   this.  I will also send for sputum gram stain and culture.  She does have   significant travel history to a TB endemic area, and with her symptoms occurring   since 1 month ago, we will need to rule out pulmonary tuberculosis.  I will   send for sputum AFB x3, and QuantiFERON Gold testing.  If positive, she will   need to be on antimycobacterial therapy, and need ID consultation.  I will put   her on droplet and airborne precautions for now until PTB is ruled out.  We   will trend her WBC count.  I will  also send for blood cultures x2 sets.  ---  I will continue her on IV fluids per sepsis protocol, with normal saline   plus 20 mEq of KCl at 125 mL per hour.  I will send for a lactate level, and   trend this, with p.r.n. IV fluid boluses.  We will closely monitor her   hemodynamics on telemetry.  ---  I will replace her potassium and the IV fluids as above.  Check magnesium   level and replace if low.  Repeat BMP in the morning.  ---  We will trend her platelet count with IV fluids and antibiotics.  I   suspect that this is reactive.  Repeat CBC in the morning.  ---  I will start her on a trial of oral Prilosec daily, along with Carafate   for her possible gastritis.  ---  We will follow her sodium level with IV fluids.      Deep venous thrombosis prophylaxis -- Lovenox subcutaneously.  Gastrointestinal prophylaxis -- PPI.  Code status -- full code.       ____________________________________     LILLIE Sol / YARELY    DD:  11/14/2017 00:14:20  DT:  11/14/2017 00:40:11    D#:  [id number]  Job#:  [id number]

## 2017-11-14 NOTE — CARE PLAN
Problem: Safety  Goal: Will remain free from injury  Outcome: PROGRESSING AS EXPECTED  Vicky Chen Fall Risk Assessment:     Last Known Fall: Within the last six months  Mobility: Dizziness/generalized weakness  Medications: No meds  Mental Status/LOC/Awareness: Awake, alert, and oriented to date, place, and person  Toileting Needs: No needs  Volume/Electrolyte Status: Use of IV fluids/tube feeds  Communication/Sensory: Visual (Glasses)/hearing deficit, Non-English patient/unable to speak/slurred speech  Behavior: Appropriate behavior  Vicky Chen Fall Risk Total: 11  Fall Risk Level: MODERATE RISK    Universal Fall Precautions:  call light/belongings in reach, bed in low position and locked, siderails up x 2    Fall Risk Level Interventions:    TRIAL (TELE 8, NEURO, MED EMIL 5) Moderate Fall Risk Interventions  Place yellow fall risk ID band on patient: verified  Provide patient/family education based on risk assessment : completed  Educate patient/family to call staff for assistance when getting out of bed: completed  Place fall precaution signage outside patient door: verified  Utilize bed/chair fall alarm: completed     Patient Specific Interventions:     Medication: review medications with patient and family  Mental Status/LOC/Awareness: reinforce falls education and encourage family to stay with patient  Toileting: provide frquent toileting  Volume/Electrolyte Status: ensure patient remains hydrated  Communication/Sensory: update plan of care on whiteboard  Behavioral: encourage patient to voice feelings and engage patient in daily activities  Mobility: ensure bed is locked and in lowest position     Problem: Knowledge Deficit  Goal: Knowledge of disease process/condition, treatment plan, diagnostic tests, and medications will improve  Outcome: PROGRESSING AS EXPECTED

## 2017-11-14 NOTE — PROGRESS NOTES
[facility] Hospitalist Progress Note    Date of Service: 2017    Chief Complaint  77 y.o. female admitted 2017 with O/p Dx'd possible pneumonia/ Allergic Rxn  Admitted with PNA and r/o TB    Interval Problem Update  Patient seen and examined today.    Patient tolerating treatment and therapies.  All Data, Medication data reviewed.  Case discussed with nursing as available.  Plan of Care reviewed with patient and notified of changes.   Pt feels better, less fever, stil sob and rales, c/o epigastric pain and constipation    Consultants/Specialty  N    Disposition  TBA        Review of Systems   Constitutional: Positive for fever.   HENT: Negative.    Eyes: Negative.    Respiratory: Positive for cough, sputum production and shortness of breath.    Cardiovascular: Negative.  Negative for palpitations.   Gastrointestinal: Positive for abdominal pain and nausea. Negative for heartburn and vomiting.   Genitourinary: Negative.  Negative for dysuria and frequency.   Musculoskeletal: Negative.  Negative for back pain and neck pain.   Skin: Negative.  Negative for itching and rash.   Neurological: Negative.  Negative for dizziness, focal weakness, weakness and headaches.   Endo/Heme/Allergies: Negative.  Negative for polydipsia. Does not bruise/bleed easily.   Psychiatric/Behavioral: Negative.  Negative for depression.   All other systems reviewed and are negative.     Physical Exam  Laboratory/Imaging   Hemodynamics  Temp (24hrs), Av.1 °C (98.7 °F), Min:36.3 °C (97.3 °F), Max:37.4 °C (99.4 °F)   Temperature: 37.4 °C (99.4 °F)  Pulse  Av  Min: 85  Max: 114 Heart Rate (Monitored): (!) 102  Blood Pressure : 116/73, NIBP: 119/62      Respiratory      Respiration: 18, Pulse Oximetry: 99 %, O2 Daily Delivery Respiratory : Room Air with O2 Available     Work Of Breathing / Effort: Mild  RUL Breath Sounds: Expiratory Wheezes, RML Breath Sounds: Diminished, RLL Breath Sounds: Diminished, RENEE Breath Sounds:  Expiratory Wheezes, LLL Breath Sounds: Diminished    Fluids    Intake/Output Summary (Last 24 hours) at 11/14/17 1512  Last data filed at 11/14/17 1400   Gross per 24 hour   Intake              840 ml   Output              200 ml   Net              640 ml       Nutrition  Orders Placed This Encounter   Procedures   • Diet Order     Standing Status:   Standing     Number of Occurrences:   1     Order Specific Question:   Diet:     Answer:   Regular [1]     Physical Exam   Constitutional: She is oriented to person, place, and time. She appears well-developed and well-nourished.   HENT:   Head: Normocephalic and atraumatic.   Nose: Nose normal.   Mouth/Throat: Oropharynx is clear and moist.   Eyes: Conjunctivae and EOM are normal. Pupils are equal, round, and reactive to light.   Neck: Normal range of motion. Neck supple. No JVD present. No thyromegaly present.   Cardiovascular: Normal rate, regular rhythm and normal heart sounds.  Exam reveals no gallop and no friction rub.    Pulmonary/Chest: Effort normal. She has rales.   Abdominal: Soft. Bowel sounds are normal. She exhibits no distension and no mass. There is tenderness. There is no rebound and no guarding.   Musculoskeletal: Normal range of motion. She exhibits no edema or tenderness.   Lymphadenopathy:     She has no cervical adenopathy.   Neurological: She is alert and oriented to person, place, and time. No cranial nerve deficit.   Skin: Skin is warm and dry. She is not diaphoretic.   Psychiatric: She has a normal mood and affect. Her behavior is normal.   Nursing note and vitals reviewed.      Recent Labs      11/13/17 1740 11/14/17   0239   WBC  20.2*  18.2*   RBC  3.66*  3.02*   HEMOGLOBIN  11.0*  9.0*   HEMATOCRIT  33.2*  27.6*   MCV  90.7  91.4   MCH  30.1  29.8   MCHC  33.1*  32.6*   RDW  42.2  42.3   PLATELETCT  617*  502*   MPV  8.7*  9.1     Recent Labs      11/13/17   1740  11/14/17   0239   SODIUM  131*  135   POTASSIUM  3.4*  3.9   CHLORIDE  96   105   CO2  23  24   GLUCOSE  142*  105*   BUN  23*  16   CREATININE  0.99  0.65   CALCIUM  9.6  8.2*                      Assessment/Plan     Anemia- (present on admission)   Assessment & Plan    Unclear cause        Sepsis (CMS-HCC)- (present on admission)   Assessment & Plan    Mild/simple, c/w measures        Community acquired pneumonia- (present on admission)   Assessment & Plan    Unclear pathogen  Travel through [facility]  eval for TB and atypicals            Reviewed items::  Medications reviewed, Labs reviewed and Radiology images reviewed  Wilson catheter::  No Wilson  DVT prophylaxis pharmacological::  Enoxaparin (Lovenox)  DVT prophylaxis - mechanical:  SCDs  Antibiotics:  Treating active infection/contamination beyond 24 hours perioperative coverage      Plan  C/w emp. Tx  eval for anemia  R/o TB f/o  Resp. Care  See orders

## 2017-11-14 NOTE — PROGRESS NOTES
Received report and met with patient at bedside. Discussed plan of care for the day. IVF infusing, pt in SR. Denies any needs at this time. Bed locked & in lowest position, call bell and side table within reach.

## 2017-11-14 NOTE — PROGRESS NOTES
2 RN Skin check done with NOELLE Louise  Pt. Bilateral ears slightly red and blanching, bilateral feet and legs dry/flaky, sacrum/coccyx red and blanching. All other skin intact

## 2017-11-14 NOTE — ED PROVIDER NOTES
ED Provider Note    Scribed for Dr. Cesar Franco M.D. by Sienna Woodson. 11/13/2017  8:02 PM    Primary care provider: GUCCI Chappell  Means of arrival: Walk-in  History obtained from: Patient, family member  History limited by: None    CHIEF COMPLAINT  Chief Complaint   Patient presents with   • Cough   • Shortness of Breath       HPI  Irina Conrad is a 77 y.o. female who presents to the Emergency Department for worsened intermittent cough, shortness of breath, upper abdominal pains, and fever onset a month ago. Per family member, the patient began developing symptoms a month ago after returning back from the [facility] and believed symptoms were initially due to allergies. She was seen at Urgent Care 3 days ago for symptoms and was found to have low oxygen saturations on room air. Chest xray revealed pneumonia and she was prescribed antibiotic Doxycycline, in which the patient reports no relief from. She denies any leg swelling. The patient has no past medical history of hypertension or diabetes.     REVIEW OF SYSTEMS  Pertinent positives include cough, shortness of breath, upper abdominal pains, and fever. Pertinent negatives include no leg swelling. As above, all other systems reviewed and are negative.   See HPI for further details.   C.     PAST MEDICAL HISTORY   has a past medical history of Chronic cough; Falls; and Knee pain, right.    SURGICAL HISTORY  patient denies any surgical history    SOCIAL HISTORY  Social History   Substance Use Topics   • Smoking status: Never Smoker   • Smokeless tobacco: Never Used   • Alcohol use No      History   Drug Use No       FAMILY HISTORY  Family History   Problem Relation Age of Onset   • Asthma Father        CURRENT MEDICATIONS  No current facility-administered medications for this encounter.     Current Outpatient Prescriptions:   •  doxycycline (VIBRAMYCIN) 100 MG Tab, Take 1 Tab by mouth 2 times a day for 10 days., Disp: 20 Tab, Rfl: 0  •   "Dextromethorphan Polistirex (COUGH DM PO), Take  by mouth., Disp: , Rfl:   •  fluticasone (FLONASE) 50 MCG/ACT nasal spray, Spray 1 Spray in nose every day., Disp: 16 g, Rfl: 2  •  loratadine (CLARITIN) 10 MG Tab, Take 1 Tab by mouth every day., Disp: 30 Tab, Rfl: 0    ALLERGIES  No Known Allergies    PHYSICAL EXAM  VITAL SIGNS: /63   Pulse (!) 113   Temp 36.3 °C (97.3 °F) (Temporal)   Resp 18   Ht 1.372 m (4' 6\")   Wt 43.4 kg (95 lb 10.9 oz)   SpO2 98%   BMI 23.07 kg/m²     Constitutional: Well developed, Well nourished, mild distress, Non-toxic appearance.   HENT: Normocephalic, Atraumatic, Bilateral external ears normal, Oropharynx moist, No oral exudates.   Eyes: PERRLA, EOMI, Conjunctiva normal, No discharge.   Neck: No tenderness, Supple, No stridor.   Lymphatic: No lymphadenopathy noted.   Cardiovascular: Tachycardic, Normal rhythm.   Thorax & Lungs: Crackles in all lung fields bilaterally.  No respiratory distress, No wheezing   Abdomen: Soft, mild epigastric tenderness, No masses, No pulsatile masses.   Skin: Warm, Dry, No erythema, No rash.   Extremities:, No edema No cyanosis.   Musculoskeletal: No tenderness to palpation or major deformities noted.  Intact distal pulses  Neurologic: Awake, alert. Moves all extremities spontaneously.  Psychiatric: Affect normal, Judgment normal, Mood normal.     LABS  Results for orders placed or performed during the hospital encounter of 11/13/17   CBC WITH DIFFERENTIAL   Result Value Ref Range    WBC 20.2 (H) 4.8 - 10.8 K/uL    RBC 3.66 (L) 4.20 - 5.40 M/uL    Hemoglobin 11.0 (L) 12.0 - 16.0 g/dL    Hematocrit 33.2 (L) 37.0 - 47.0 %    MCV 90.7 81.4 - 97.8 fL    MCH 30.1 27.0 - 33.0 pg    MCHC 33.1 (L) 33.6 - 35.0 g/dL    RDW 42.2 35.9 - 50.0 fL    Platelet Count 617 (H) 164 - 446 K/uL    MPV 8.7 (L) 9.0 - 12.9 fL    Neutrophils-Polys 85.20 (H) 44.00 - 72.00 %    Lymphocytes 7.70 (L) 22.00 - 41.00 %    Monocytes 5.30 0.00 - 13.40 %    Eosinophils 0.90 0.00 " - 6.90 %    Basophils 0.20 0.00 - 1.80 %    Immature Granulocytes 0.70 0.00 - 0.90 %    Nucleated RBC 0.00 /100 WBC    Neutrophils (Absolute) 17.16 (H) 2.00 - 7.15 K/uL    Lymphs (Absolute) 1.55 1.00 - 4.80 K/uL    Monos (Absolute) 1.07 (H) 0.00 - 0.85 K/uL    Eos (Absolute) 0.19 0.00 - 0.51 K/uL    Baso (Absolute) 0.05 0.00 - 0.12 K/uL    Immature Granulocytes (abs) 0.15 (H) 0.00 - 0.11 K/uL    NRBC (Absolute) 0.00 K/uL   COMP METABOLIC PANEL   Result Value Ref Range    Sodium 131 (L) 135 - 145 mmol/L    Potassium 3.4 (L) 3.6 - 5.5 mmol/L    Chloride 96 96 - 112 mmol/L    Co2 23 20 - 33 mmol/L    Anion Gap 12.0 (H) 0.0 - 11.9    Glucose 142 (H) 65 - 99 mg/dL    Bun 23 (H) 8 - 22 mg/dL    Creatinine 0.99 0.50 - 1.40 mg/dL    Calcium 9.6 8.5 - 10.5 mg/dL    AST(SGOT) 30 12 - 45 U/L    ALT(SGPT) 36 2 - 50 U/L    Alkaline Phosphatase 149 (H) 30 - 99 U/L    Total Bilirubin 0.4 0.1 - 1.5 mg/dL    Albumin 3.6 3.2 - 4.9 g/dL    Total Protein 8.3 (H) 6.0 - 8.2 g/dL    Globulin 4.7 (H) 1.9 - 3.5 g/dL    A-G Ratio 0.8 g/dL   LIPASE   Result Value Ref Range    Lipase 24 11 - 82 U/L   Lactic acid (lactate)   Result Value Ref Range    Lactic Acid 2.2 (H) 0.5 - 2.0 mmol/L   ESTIMATED GFR   Result Value Ref Range    GFR If African American >60 >60 mL/min/1.73 m 2    GFR If Non African American 55 (A) >60 mL/min/1.73 m 2   All labs reviewed by me.    RADIOLOGY  DX-CHEST-PORTABLE (1 VIEW)   Final Result         1.  Patchy bilateral pulmonary opacities, greatest in the right apex, stable since prior, most compatible with infiltrates. Radiographic follow-up to resolution recommended. As previously recommended exclusion of tuberculosis as clinically warranted.   2.  Atherosclerosis      The radiologist's interpretation of all radiological studies have been reviewed by me.    COURSE & MEDICAL DECISION MAKING  Pertinent Labs & Imaging studies reviewed. (See chart for details)    5:31 PM Ordered DX-chest, lactic acid, estimated GFR,  urinalysis, urine culture, blood culture x2, POC urine pregnancy, CBC, CMP, lipase, and POC urinalysis.    8:06 PM - Reviewed the patient's lab results, which shows elevated WBC of 20.2. Patient seen and examined at bedside. Patient will be treated with Rocephin 2g, Zithromax 500mg, and normal saline 1.302L infusion for tachycardia.. The differential diagnoses include but are not limited to: pneumonia, CHF. Discussed with the patient and daughter that she will be admitted for further medical treatment and care, which she understand and agrees with.     8:24 PM I discussed the patient's case and the above findings with Dr. Ramirez (Hospitalist) who agreed to admit th epatient.     Decision Making:   Patient has apparent pneumonia with bilateral infiltrates, elevated white blood cell count and lactic acid level, possibly septic and was treated with IV fluids and antibiotics. She is artery, partially treated with oral antibiotics is worsening, will need hospitalization    DISPOSITION:  Patient will be admitted to Dr. Ramirez in guarded condition.     FINAL IMPRESSION  1. Pneumonia due to infectious organism, unspecified laterality, unspecified part of lung          Sienna QUISPE (Gualberto), am scribing for, and in the presence of, Cesar Franco M.D..    Electronically signed by: Sienna Woodson (Gualberto), 11/13/2017    Cesar QUISPE M.D. personally performed the services described in this documentation, as scribed by Sienna Woodson in my presence, and it is both accurate and complete.    The note accurately reflects work and decisions made by me.  Cesar Franco  11/13/2017  10:04 PM

## 2017-11-14 NOTE — ED NOTES
Dr. Franco notified that pt c/o of sharp epigastric pain. He state that she states pain has been present for several days. Orders received.

## 2017-11-14 NOTE — ASSESSMENT & PLAN NOTE
Unclear pathogen  Very high risk for TB given presentation-- > TB testing NEGATIVE  BAL done 11/19  ID following--> on iv merrem    Await pathology

## 2017-11-14 NOTE — ED NOTES
Med rec updated and complete.  Allergies reviewed.  Pt is currently taking an antibiotic.  Pt has been taking it as prescribed.  All doses taken today.

## 2017-11-14 NOTE — ED NOTES
Pt sent here from Urgent Care with c/c of pneumonia diagnosed Saturday and placed on antibiotics. Pt with SOB, fever, and cough and stating symptoms not improving.

## 2017-11-15 LAB
ANION GAP SERPL CALC-SCNC: 9 MMOL/L (ref 0–11.9)
BNP SERPL-MCNC: 286 PG/ML (ref 0–100)
BUN SERPL-MCNC: 6 MG/DL (ref 8–22)
CALCIUM SERPL-MCNC: 8.3 MG/DL (ref 8.5–10.5)
CHLORIDE SERPL-SCNC: 107 MMOL/L (ref 96–112)
CO2 SERPL-SCNC: 21 MMOL/L (ref 20–33)
CREAT SERPL-MCNC: 0.62 MG/DL (ref 0.5–1.4)
ERYTHROCYTE [DISTWIDTH] IN BLOOD BY AUTOMATED COUNT: 44.5 FL (ref 35.9–50)
GFR SERPL CREATININE-BSD FRML MDRD: >60 ML/MIN/1.73 M 2
GLUCOSE SERPL-MCNC: 92 MG/DL (ref 65–99)
GRAM STN SPEC: NORMAL
HCT VFR BLD AUTO: 27.6 % (ref 37–47)
HGB BLD-MCNC: 8.9 G/DL (ref 12–16)
M TB TUBERC IFN-G BLD QL: NEGATIVE
M TB TUBERC IFN-G/MITOGEN IGNF BLD: 0.05
M TB TUBERC IGNF/MITOGEN IGNF CONTROL: 36.53 [IU]/ML
MAGNESIUM SERPL-MCNC: 1.6 MG/DL (ref 1.5–2.5)
MCH RBC QN AUTO: 30.2 PG (ref 27–33)
MCHC RBC AUTO-ENTMCNC: 32.2 G/DL (ref 33.6–35)
MCV RBC AUTO: 93.6 FL (ref 81.4–97.8)
MITOGEN IGNF BCKGRD COR BLD-ACNC: 0.02 [IU]/ML
PHOSPHATE SERPL-MCNC: 2.8 MG/DL (ref 2.5–4.5)
PLATELET # BLD AUTO: 506 K/UL (ref 164–446)
PMV BLD AUTO: 8.5 FL (ref 9–12.9)
POTASSIUM SERPL-SCNC: 4.3 MMOL/L (ref 3.6–5.5)
RBC # BLD AUTO: 2.95 M/UL (ref 4.2–5.4)
RHODAMINE-AURAMINE STN SPEC: NORMAL
RHODAMINE-AURAMINE STN SPEC: NORMAL
SIGNIFICANT IND 70042: NORMAL
SITE SITE: NORMAL
SODIUM SERPL-SCNC: 137 MMOL/L (ref 135–145)
SOURCE SOURCE: NORMAL
WBC # BLD AUTO: 15.3 K/UL (ref 4.8–10.8)

## 2017-11-15 PROCEDURE — 90670 PCV13 VACCINE IM: CPT | Performed by: INTERNAL MEDICINE

## 2017-11-15 PROCEDURE — 302240 PAPR UNIT: Performed by: HOSPITALIST

## 2017-11-15 PROCEDURE — 84100 ASSAY OF PHOSPHORUS: CPT

## 2017-11-15 PROCEDURE — 80048 BASIC METABOLIC PNL TOTAL CA: CPT

## 2017-11-15 PROCEDURE — 700102 HCHG RX REV CODE 250 W/ 637 OVERRIDE(OP): Performed by: HOSPITALIST

## 2017-11-15 PROCEDURE — 700105 HCHG RX REV CODE 258: Performed by: HOSPITALIST

## 2017-11-15 PROCEDURE — 86713 LEGIONELLA ANTIBODY: CPT

## 2017-11-15 PROCEDURE — 90471 IMMUNIZATION ADMIN: CPT

## 2017-11-15 PROCEDURE — A9270 NON-COVERED ITEM OR SERVICE: HCPCS | Performed by: HOSPITALIST

## 2017-11-15 PROCEDURE — 83735 ASSAY OF MAGNESIUM: CPT

## 2017-11-15 PROCEDURE — 85027 COMPLETE CBC AUTOMATED: CPT

## 2017-11-15 PROCEDURE — 700111 HCHG RX REV CODE 636 W/ 250 OVERRIDE (IP): Performed by: INTERNAL MEDICINE

## 2017-11-15 PROCEDURE — 700102 HCHG RX REV CODE 250 W/ 637 OVERRIDE(OP): Performed by: INTERNAL MEDICINE

## 2017-11-15 PROCEDURE — A9270 NON-COVERED ITEM OR SERVICE: HCPCS | Performed by: INTERNAL MEDICINE

## 2017-11-15 PROCEDURE — 99232 SBSQ HOSP IP/OBS MODERATE 35: CPT | Performed by: HOSPITALIST

## 2017-11-15 PROCEDURE — 83880 ASSAY OF NATRIURETIC PEPTIDE: CPT

## 2017-11-15 PROCEDURE — 770027 HCHG ROOM/CARE - NEGATIVE PRESSURE ISOLATION

## 2017-11-15 PROCEDURE — 700111 HCHG RX REV CODE 636 W/ 250 OVERRIDE (IP): Performed by: HOSPITALIST

## 2017-11-15 PROCEDURE — 700101 HCHG RX REV CODE 250: Performed by: HOSPITALIST

## 2017-11-15 PROCEDURE — 36415 COLL VENOUS BLD VENIPUNCTURE: CPT

## 2017-11-15 RX ORDER — DIPHENHYDRAMINE HYDROCHLORIDE 50 MG/ML
25 INJECTION INTRAMUSCULAR; INTRAVENOUS ONCE
Status: COMPLETED | OUTPATIENT
Start: 2017-11-15 | End: 2017-11-15

## 2017-11-15 RX ORDER — DIPHENHYDRAMINE HCL 25 MG
25 TABLET ORAL ONCE
Status: COMPLETED | OUTPATIENT
Start: 2017-11-15 | End: 2017-11-15

## 2017-11-15 RX ORDER — ACETAMINOPHEN 325 MG/1
650 TABLET ORAL ONCE
Status: COMPLETED | OUTPATIENT
Start: 2017-11-15 | End: 2017-11-15

## 2017-11-15 RX ADMIN — FLUTICASONE PROPIONATE 50 MCG: 50 SPRAY, METERED NASAL at 08:38

## 2017-11-15 RX ADMIN — STANDARDIZED SENNA CONCENTRATE AND DOCUSATE SODIUM 2 TABLET: 8.6; 5 TABLET, FILM COATED ORAL at 08:37

## 2017-11-15 RX ADMIN — CEFTRIAXONE 1 G: 1 INJECTION, SOLUTION INTRAVENOUS at 20:39

## 2017-11-15 RX ADMIN — AZITHROMYCIN 250 MG: 250 TABLET, FILM COATED ORAL at 08:38

## 2017-11-15 RX ADMIN — IRON DEXTRAN 25 MG: 50 INJECTION INTRAMUSCULAR; INTRAVENOUS at 12:38

## 2017-11-15 RX ADMIN — SUCRALFATE 1 G: 1 TABLET ORAL at 12:17

## 2017-11-15 RX ADMIN — FAMOTIDINE 20 MG: 20 TABLET, FILM COATED ORAL at 20:39

## 2017-11-15 RX ADMIN — LORATADINE 10 MG: 10 TABLET ORAL at 08:36

## 2017-11-15 RX ADMIN — ACETAMINOPHEN 650 MG: 325 TABLET, FILM COATED ORAL at 23:14

## 2017-11-15 RX ADMIN — PNEUMOCOCCAL 13-VALENT CONJUGATE VACCINE 0.5 ML: 2.2; 2.2; 2.2; 2.2; 2.2; 4.4; 2.2; 2.2; 2.2; 2.2; 2.2; 2.2; 2.2 INJECTION, SUSPENSION INTRAMUSCULAR at 05:56

## 2017-11-15 RX ADMIN — ENOXAPARIN SODIUM 30 MG: 100 INJECTION SUBCUTANEOUS at 08:37

## 2017-11-15 RX ADMIN — POTASSIUM CHLORIDE AND SODIUM CHLORIDE: 900; 150 INJECTION, SOLUTION INTRAVENOUS at 06:01

## 2017-11-15 RX ADMIN — POTASSIUM CHLORIDE AND SODIUM CHLORIDE: 900; 150 INJECTION, SOLUTION INTRAVENOUS at 20:39

## 2017-11-15 RX ADMIN — DIPHENHYDRAMINE HCL 25 MG: 25 TABLET ORAL at 12:17

## 2017-11-15 RX ADMIN — SUCRALFATE 1 G: 1 TABLET ORAL at 05:56

## 2017-11-15 RX ADMIN — FAMOTIDINE 20 MG: 20 TABLET, FILM COATED ORAL at 08:38

## 2017-11-15 RX ADMIN — IRON DEXTRAN 1175 MG: 50 INJECTION INTRAMUSCULAR; INTRAVENOUS at 16:00

## 2017-11-15 RX ADMIN — ACETAMINOPHEN 650 MG: 325 TABLET, FILM COATED ORAL at 12:17

## 2017-11-15 RX ADMIN — SUCRALFATE 1 G: 1 TABLET ORAL at 23:07

## 2017-11-15 RX ADMIN — SUCRALFATE 1 G: 1 TABLET ORAL at 17:40

## 2017-11-15 ASSESSMENT — PAIN SCALES - GENERAL
PAINLEVEL_OUTOF10: 0

## 2017-11-15 ASSESSMENT — ENCOUNTER SYMPTOMS
DIZZINESS: 0
HEADACHES: 0
CARDIOVASCULAR NEGATIVE: 1
SHORTNESS OF BREATH: 1
NECK PAIN: 0
DEPRESSION: 0
MUSCULOSKELETAL NEGATIVE: 1
ABDOMINAL PAIN: 1
SPUTUM PRODUCTION: 1
FEVER: 1
POLYDIPSIA: 0
EYES NEGATIVE: 1
BRUISES/BLEEDS EASILY: 0
NAUSEA: 1
FOCAL WEAKNESS: 0
VOMITING: 0
WEAKNESS: 0
COUGH: 1
PSYCHIATRIC NEGATIVE: 1
NEUROLOGICAL NEGATIVE: 1
BACK PAIN: 0
PALPITATIONS: 0
HEARTBURN: 0

## 2017-11-15 ASSESSMENT — PATIENT HEALTH QUESTIONNAIRE - PHQ9
SUM OF ALL RESPONSES TO PHQ QUESTIONS 1-9: 0
SUM OF ALL RESPONSES TO PHQ9 QUESTIONS 1 AND 2: 0
2. FEELING DOWN, DEPRESSED, IRRITABLE, OR HOPELESS: NOT AT ALL
1. LITTLE INTEREST OR PLEASURE IN DOING THINGS: NOT AT ALL

## 2017-11-15 ASSESSMENT — COPD QUESTIONNAIRES
DURING THE PAST 4 WEEKS HOW MUCH DID YOU FEEL SHORT OF BREATH: NONE/LITTLE OF THE TIME
COPD SCREENING SCORE: 2
DO YOU EVER COUGH UP ANY MUCUS OR PHLEGM?: NO/ONLY WITH OCCASIONAL COLDS OR INFECTIONS
HAVE YOU SMOKED AT LEAST 100 CIGARETTES IN YOUR ENTIRE LIFE: NO/DON'T KNOW

## 2017-11-15 ASSESSMENT — LIFESTYLE VARIABLES: DO YOU DRINK ALCOHOL: NO

## 2017-11-15 NOTE — CARE PLAN
Problem: Infection  Goal: Will remain free from infection  Outcome: PROGRESSING AS EXPECTED  Discussed with the patient the importance of washing hands before and after eating or using the restroom and the use of the sanitizing hand wipes after throwing out dirty tissues in order to help prevent infections. Patient accepting of the information. All questions answered at this time.

## 2017-11-15 NOTE — PROGRESS NOTES
Sputum culture collected & sent to lab. Called lab to verify, AFB culture and respiratory culture with gram stain can both be obtained from one sample.

## 2017-11-15 NOTE — PROGRESS NOTES
IV Iron Per Pharmacy Note  Patient Lean Body Weight:  ~45 kg  Reason for Iron Replacement: iron deficiency anemia    Lab Results   Component Value Date/Time    WBC 15.3 (H) 11/15/2017 03:53 AM    RBC 2.95 (L) 11/15/2017 03:53 AM    HEMOGLOBIN 8.9 (L) 11/15/2017 03:53 AM    HEMATOCRIT 27.6 (L) 11/15/2017 03:53 AM    MCV 93.6 11/15/2017 03:53 AM    MCH 30.2 11/15/2017 03:53 AM    MCHC 32.2 (L) 11/15/2017 03:53 AM    MPV 8.5 (L) 11/15/2017 03:53 AM     Recent Labs      11/14/17   1544  11/14/17   1545   FERRITIN  195.8   --    IRON   --   13*      Recent Labs      11/15/17   0353   CREATININE  0.62     CrCl cannot be calculated (Unknown ideal weight.).    Assessment/Plan:  1. IV Iron Indicated. Indices meet P&T criteria. Unclear etiology. Suspect multi-factorial in origin given presentation on admission.  2. Give Iron Dextran 25 mg IV test dose following diphenhydramine/acetaminophen premeds over 30 minutes per protocol.  3. If no reaction (Anaphylaxis, Hypotension/Hypertension, N/V/D, Chest pain/Back Pain, Urticaria/Pruritis) in the next hour, proceed to full dose. Nursing to call the pharmacy IV room at ext. [phone] for full dose.  4. Full dose: Iron Dextran 1175 mg IV over 4 hours. Continue to monitor for delayed ADR including: Arthralgia/myalgia, Headache/backache, chills/dizziness/malaise, moderate to high fever and n/v.      Ronald Manuel, PharmD

## 2017-11-15 NOTE — PROGRESS NOTES
Received report from day shift RN. Assumed care of patient. Patient is SR on the monitor at this time. Patient is sitting up in bed with no family present at this time. Patient declines any needs at this time. Plan of care discussed. Bed locked and in the lowest position with call light within reach.

## 2017-11-15 NOTE — PROGRESS NOTES
Vicky Chen Fall Risk Assessment:     Last Known Fall: Within the last six months  Mobility: Dizziness/generalized weakness  Medications: No meds  Mental Status/LOC/Awareness: Awake, alert, and oriented to date, place, and person  Toileting Needs: No needs  Volume/Electrolyte Status: Use of IV fluids/tube feeds  Communication/Sensory: Non-English patient/unable to speak/slurred speech  Behavior: Appropriate behavior  Vicky Chen Fall Risk Total: 10  Fall Risk Level: LOW RISK    Universal Fall Precautions:  call light/belongings in reach, bed in low position and locked, wheelchairs and assistive devices out of sight, use non-slip footwear, siderails up x 2, adequate lighting, clutter free and spill free environment, educate on level of risk, educate to call for assistance    Fall Risk Level Interventions:   TRIAL ([employer], NEURO, MED EMIL 5) Low Fall Risk Interventions  Place yellow fall risk ID band on patient: verified  Provide patient/family education based on risk assessment: completed  Educate patient/family to call staff for assistance when getting out of bed: completed  Place fall precaution signage outside patient door: verifiedTRIAL ([employer] 8, NEURO, MED EMIL 5) Moderate Fall Risk Interventions  Place yellow fall risk ID band on patient: verified  Provide patient/family education based on risk assessment : completed  Educate patient/family to call staff for assistance when getting out of bed: completed  Place fall precaution signage outside patient door: verified  Utilize bed/chair fall alarm: completed     Patient Specific Interventions:     Medication: review medications with patient and family, assess for medications that can be discontinued or dosage decreased and limit combination of prn medications  Mental Status/LOC/Awareness: reinforce falls education, check on patient hourly, utilize bed/chair fall alarm, reinforce the use of call light and provide activity  Toileting: provide frquent  toileting  Volume/Electrolyte Status: ensure patient remains hydrated, monitor abnormal lab values and ensure IV fluids are appropriate  Communication/Sensory: update plan of care on whiteboard  Behavioral: not applicable  Mobility: dangle prior to standing, utilize bed/chair fall alarm, ensure bed is locked and in lowest position, provide appropriate assistive device and instruct patient to exit bed on their strongest side

## 2017-11-15 NOTE — CARE PLAN
Problem: Knowledge Deficit  Goal: Knowledge of disease process/condition, treatment plan, diagnostic tests, and medications will improve  Outcome: PROGRESSING AS EXPECTED  Discussed POC and medications with pt. with verbalized understanding. Discussed pt education regarding PNA, rule-out Tb, and expected POC.

## 2017-11-15 NOTE — PROGRESS NOTES
Pt alert and oriented x4. VSS, requiring 2 L via Nc. Coarse crackles throughout. Pt complains of dyspnea w/ exertion. Quickly desats to low 80's on Ra. No pain. Overnight per night Rn, pt was diaphoretic, low grade fever of 100.2.  Breakfast is at the bedisde. No visible signs of distress. Instructed to use call light with verbalized understanding. Will continue to monitor.

## 2017-11-16 ENCOUNTER — APPOINTMENT (OUTPATIENT)
Dept: RADIOLOGY | Facility: MEDICAL CENTER | Age: 77
DRG: 853 | End: 2017-11-16
Attending: HOSPITALIST
Payer: MEDICARE

## 2017-11-16 LAB
ANION GAP SERPL CALC-SCNC: 6 MMOL/L (ref 0–11.9)
BACTERIA SPEC RESP CULT: NORMAL
BACTERIA UR CULT: NORMAL
BUN SERPL-MCNC: 5 MG/DL (ref 8–22)
CALCIUM SERPL-MCNC: 8.8 MG/DL (ref 8.5–10.5)
CHLORIDE SERPL-SCNC: 103 MMOL/L (ref 96–112)
CO2 SERPL-SCNC: 26 MMOL/L (ref 20–33)
CREAT SERPL-MCNC: 0.59 MG/DL (ref 0.5–1.4)
ERYTHROCYTE [DISTWIDTH] IN BLOOD BY AUTOMATED COUNT: 42.2 FL (ref 35.9–50)
GFR SERPL CREATININE-BSD FRML MDRD: >60 ML/MIN/1.73 M 2
GLUCOSE SERPL-MCNC: 92 MG/DL (ref 65–99)
GRAM STN SPEC: NORMAL
HCT VFR BLD AUTO: 27.7 % (ref 37–47)
HGB BLD-MCNC: 9 G/DL (ref 12–16)
MAGNESIUM SERPL-MCNC: 1.5 MG/DL (ref 1.5–2.5)
MCH RBC QN AUTO: 29.9 PG (ref 27–33)
MCHC RBC AUTO-ENTMCNC: 32.5 G/DL (ref 33.6–35)
MCV RBC AUTO: 92 FL (ref 81.4–97.8)
PHOSPHATE SERPL-MCNC: 2.8 MG/DL (ref 2.5–4.5)
PLATELET # BLD AUTO: 548 K/UL (ref 164–446)
PMV BLD AUTO: 8.7 FL (ref 9–12.9)
POTASSIUM SERPL-SCNC: 4.1 MMOL/L (ref 3.6–5.5)
RBC # BLD AUTO: 3.01 M/UL (ref 4.2–5.4)
RHODAMINE-AURAMINE STN SPEC: NORMAL
SIGNIFICANT IND 70042: NORMAL
SITE SITE: NORMAL
SODIUM SERPL-SCNC: 135 MMOL/L (ref 135–145)
SOURCE SOURCE: NORMAL
WBC # BLD AUTO: 15.6 K/UL (ref 4.8–10.8)

## 2017-11-16 PROCEDURE — 99232 SBSQ HOSP IP/OBS MODERATE 35: CPT | Performed by: HOSPITALIST

## 2017-11-16 PROCEDURE — 71260 CT THORAX DX C+: CPT

## 2017-11-16 PROCEDURE — A9270 NON-COVERED ITEM OR SERVICE: HCPCS | Performed by: HOSPITALIST

## 2017-11-16 PROCEDURE — 84100 ASSAY OF PHOSPHORUS: CPT

## 2017-11-16 PROCEDURE — 83735 ASSAY OF MAGNESIUM: CPT

## 2017-11-16 PROCEDURE — 700101 HCHG RX REV CODE 250: Performed by: HOSPITALIST

## 2017-11-16 PROCEDURE — 87015 SPECIMEN INFECT AGNT CONCNTJ: CPT

## 2017-11-16 PROCEDURE — 85027 COMPLETE CBC AUTOMATED: CPT

## 2017-11-16 PROCEDURE — 700117 HCHG RX CONTRAST REV CODE 255: Performed by: HOSPITALIST

## 2017-11-16 PROCEDURE — 700111 HCHG RX REV CODE 636 W/ 250 OVERRIDE (IP): Performed by: HOSPITALIST

## 2017-11-16 PROCEDURE — 700102 HCHG RX REV CODE 250 W/ 637 OVERRIDE(OP): Performed by: INTERNAL MEDICINE

## 2017-11-16 PROCEDURE — 80048 BASIC METABOLIC PNL TOTAL CA: CPT

## 2017-11-16 PROCEDURE — 87116 MYCOBACTERIA CULTURE: CPT

## 2017-11-16 PROCEDURE — 36415 COLL VENOUS BLD VENIPUNCTURE: CPT

## 2017-11-16 PROCEDURE — 770027 HCHG ROOM/CARE - NEGATIVE PRESSURE ISOLATION

## 2017-11-16 PROCEDURE — 87206 SMEAR FLUORESCENT/ACID STAI: CPT

## 2017-11-16 PROCEDURE — A9270 NON-COVERED ITEM OR SERVICE: HCPCS | Performed by: INTERNAL MEDICINE

## 2017-11-16 PROCEDURE — 700102 HCHG RX REV CODE 250 W/ 637 OVERRIDE(OP): Performed by: HOSPITALIST

## 2017-11-16 RX ADMIN — FLUTICASONE PROPIONATE 50 MCG: 50 SPRAY, METERED NASAL at 08:12

## 2017-11-16 RX ADMIN — POTASSIUM CHLORIDE AND SODIUM CHLORIDE: 900; 150 INJECTION, SOLUTION INTRAVENOUS at 08:09

## 2017-11-16 RX ADMIN — IOHEXOL 80 ML: 350 INJECTION, SOLUTION INTRAVENOUS at 19:28

## 2017-11-16 RX ADMIN — POTASSIUM CHLORIDE AND SODIUM CHLORIDE: 900; 150 INJECTION, SOLUTION INTRAVENOUS at 20:09

## 2017-11-16 RX ADMIN — POLYETHYLENE GLYCOL 3350 1 PACKET: 17 POWDER, FOR SOLUTION ORAL at 08:10

## 2017-11-16 RX ADMIN — SUCRALFATE 1 G: 1 TABLET ORAL at 05:32

## 2017-11-16 RX ADMIN — SUCRALFATE 1 G: 1 TABLET ORAL at 13:11

## 2017-11-16 RX ADMIN — FAMOTIDINE 20 MG: 20 TABLET, FILM COATED ORAL at 20:09

## 2017-11-16 RX ADMIN — SUCRALFATE 1 G: 1 TABLET ORAL at 17:42

## 2017-11-16 RX ADMIN — FAMOTIDINE 20 MG: 20 TABLET, FILM COATED ORAL at 08:11

## 2017-11-16 RX ADMIN — LORATADINE 10 MG: 10 TABLET ORAL at 08:11

## 2017-11-16 RX ADMIN — SUCRALFATE 1 G: 1 TABLET ORAL at 23:27

## 2017-11-16 RX ADMIN — CEFTRIAXONE 1 G: 1 INJECTION, SOLUTION INTRAVENOUS at 20:09

## 2017-11-16 RX ADMIN — ENOXAPARIN SODIUM 30 MG: 100 INJECTION SUBCUTANEOUS at 08:11

## 2017-11-16 RX ADMIN — AZITHROMYCIN 250 MG: 250 TABLET, FILM COATED ORAL at 08:11

## 2017-11-16 ASSESSMENT — ENCOUNTER SYMPTOMS
EYES NEGATIVE: 1
SHORTNESS OF BREATH: 1
WEAKNESS: 0
PSYCHIATRIC NEGATIVE: 1
CARDIOVASCULAR NEGATIVE: 1
DIZZINESS: 0
NAUSEA: 1
NECK PAIN: 0
ABDOMINAL PAIN: 1
MUSCULOSKELETAL NEGATIVE: 1
NEUROLOGICAL NEGATIVE: 1
POLYDIPSIA: 0
DEPRESSION: 0
BACK PAIN: 0
FEVER: 1
HEADACHES: 0
SPUTUM PRODUCTION: 1
VOMITING: 0
PALPITATIONS: 0
BRUISES/BLEEDS EASILY: 0
FOCAL WEAKNESS: 0
HEARTBURN: 0
COUGH: 1

## 2017-11-16 ASSESSMENT — PAIN SCALES - GENERAL
PAINLEVEL_OUTOF10: 0

## 2017-11-16 NOTE — PROGRESS NOTES
[facility] Hospitalist Progress Note    Date of Service: 11/15/2017    Chief Complaint  77 y.o. female admitted 2017 with O/p Dx'd possible pneumonia/ Allergic Rxn  Admitted with PNA and r/o TB    Interval Problem Update  Patient seen and examined today.    Patient tolerating treatment and therapies.  All Data, Medication data reviewed.  Case discussed with nursing as available.  Plan of Care reviewed with patient and notified of changes.   Pt feels better, less fever, stil sob and rales, c/o epigastric pain and constipation  11/15 Pt feels some better, with some temp and diaphoresis last night, abdomen better, CX's still pending  Consultants/Specialty  N    Disposition  TBA        Review of Systems   Constitutional: Positive for fever.   HENT: Negative.    Eyes: Negative.    Respiratory: Positive for cough, sputum production and shortness of breath.    Cardiovascular: Negative.  Negative for palpitations.   Gastrointestinal: Positive for abdominal pain and nausea. Negative for heartburn and vomiting.   Genitourinary: Negative.  Negative for dysuria and frequency.   Musculoskeletal: Negative.  Negative for back pain and neck pain.   Skin: Negative.  Negative for itching and rash.   Neurological: Negative.  Negative for dizziness, focal weakness, weakness and headaches.   Endo/Heme/Allergies: Negative.  Negative for polydipsia. Does not bruise/bleed easily.   Psychiatric/Behavioral: Negative.  Negative for depression.   All other systems reviewed and are negative.     Physical Exam  Laboratory/Imaging   Hemodynamics  Temp (24hrs), Av.3 °C (99.2 °F), Min:36.8 °C (98.3 °F), Max:37.9 °C (100.2 °F)   Temperature: 37.1 °C (98.8 °F)  Pulse  Av.8  Min: 83  Max: 114   Blood Pressure : 134/73      Respiratory      Respiration: 18, Pulse Oximetry: 100 %     Work Of Breathing / Effort: Mild  RUL Breath Sounds: Coarse Crackles, RML Breath Sounds: Coarse Crackles, RLL Breath Sounds: Coarse Crackles, RENEE Breath  Sounds: Coarse Crackles, LLL Breath Sounds: Coarse Crackles    Fluids    Intake/Output Summary (Last 24 hours) at 11/15/17 1720  Last data filed at 11/15/17 1200   Gross per 24 hour   Intake              480 ml   Output                0 ml   Net              480 ml       Nutrition  Orders Placed This Encounter   Procedures   • Diet Order     Standing Status:   Standing     Number of Occurrences:   1     Order Specific Question:   Diet:     Answer:   Regular [1]     Physical Exam   Constitutional: She is oriented to person, place, and time. She appears well-developed and well-nourished.   HENT:   Head: Normocephalic and atraumatic.   Nose: Nose normal.   Mouth/Throat: Oropharynx is clear and moist.   Eyes: Conjunctivae and EOM are normal. Pupils are equal, round, and reactive to light.   Neck: Normal range of motion. Neck supple. No JVD present. No thyromegaly present.   Cardiovascular: Normal rate, regular rhythm and normal heart sounds.  Exam reveals no gallop and no friction rub.    Pulmonary/Chest: Effort normal. She has rales.   Abdominal: Soft. Bowel sounds are normal. She exhibits no distension and no mass. There is tenderness. There is no rebound and no guarding.   Musculoskeletal: Normal range of motion. She exhibits no edema or tenderness.   Lymphadenopathy:     She has no cervical adenopathy.   Neurological: She is alert and oriented to person, place, and time. No cranial nerve deficit.   Skin: Skin is warm and dry. She is not diaphoretic.   Psychiatric: She has a normal mood and affect. Her behavior is normal.   Nursing note and vitals reviewed.      Recent Labs      11/13/17   1740  11/14/17   0239  11/15/17   0353   WBC  20.2*  18.2*  15.3*   RBC  3.66*  3.02*  2.95*   HEMOGLOBIN  11.0*  9.0*  8.9*   HEMATOCRIT  33.2*  27.6*  27.6*   MCV  90.7  91.4  93.6   MCH  30.1  29.8  30.2   MCHC  33.1*  32.6*  32.2*   RDW  42.2  42.3  44.5   PLATELETCT  617*  502*  506*   MPV  8.7*  9.1  8.5*     Recent Labs       11/13/17   1740  11/14/17   0239  11/15/17   0353   SODIUM  131*  135  137   POTASSIUM  3.4*  3.9  4.3   CHLORIDE  96  105  107   CO2  23  24  21   GLUCOSE  142*  105*  92   BUN  23*  16  6*   CREATININE  0.99  0.65  0.62   CALCIUM  9.6  8.2*  8.3*         Recent Labs      11/15/17   0353   BNPBTYPENAT  286*              Assessment/Plan     Community acquired pneumonia- (present on admission)   Assessment & Plan    Unclear pathogen  Travel through [facility]  eval for TB and atypicals        Anemia- (present on admission)   Assessment & Plan    Iron deficiency  From ? Uterine prolapse        Sepsis (CMS-HCC)- (present on admission)   Assessment & Plan    Mild/simple, c/w measures            Reviewed items::  Medications reviewed, Labs reviewed and Radiology images reviewed  Wilson catheter::  No Wilson  DVT prophylaxis pharmacological::  Enoxaparin (Lovenox)  DVT prophylaxis - mechanical:  SCDs  Antibiotics:  Treating active infection/contamination beyond 24 hours perioperative coverage      Plan  C/w emp. Tx  eval for anemia resulted in Iron needs, replace  R/o TB f/u  Resp. Care  See orders

## 2017-11-16 NOTE — CONSULTS
76 y/o with pneumonia after long trip to [facility]  Markedly abnormal CXR  Agree with isolation r/o TB  CT chest   Bronch if sputums neg  Neg Quanti does not rule out TB  Full consult to follow

## 2017-11-16 NOTE — CONSULTS
"INFECTIOUS DISEASES INPATIENT CONSULT NOTE     Date of Service: 11/16/2017    Consult Requested By: Alex Hager M.D.    Reason for Consultation: pneumonia    History of Present Illness:   Irina Conrad is a 77 y.o. [place] admitted 11/13/2017 for pneumonia.  She was vacationing in the [facility]   from 05/2017 to 10/2017. States in good health until then and had a \"normal CXR\".  In September, she started to feel ill with   subjective fever, chills, night sweats and nonproductive cough.  +poor appetite and weight loss-not sure how much. No hemoptysis.  Chest x-ray and sputum AFB were obtained in the [facility] which were reportedly both negative.  She was treated for allergy with antihistamines.  She came back to the  in   October, but she continued to have above-mentioned symptoms but the cough worsened, productive of white to greenish phlegm +short of breath and pleuritic chest pain.  Also had some abdominal pain that resolved.  She denied any nausea, vomiting, diarrhea. She went to urgent care centerthe Saturday prior to admission and chest x-ray showed pneumonia.  She was started on oral doxycycline. Due to persistent symptoms she came to the ED for further evaluation and management.   In the ER she was tachycardic, but afebrile. +leukocytosis of WBC count of 20,200 with a platelet count of 617.  CXR showed bilateral patchy pulmonary infiltrates.  She was given ceftriaxone and azithromycin   AFBs so far neg.  Quantiferon Gold neg. Infectious Diseases consulted for antibiotic recommendation and management    Review Of Systems:  ROS are negative except as noted above in the HPI.    PMH:   Past Medical History:   Diagnosis Date   • Chronic cough    • Falls    • Knee pain, right        PSH:  No past surgical history on file.    FAMILY HX:  Family History   Problem Relation Age of Onset   • Asthma Father        SOCIAL HX:  Social History     Social History   • Marital status:      Spouse name: " N/A   • Number of children: N/A   • Years of education: N/A     Occupational History   • Not on file.     Social History Main Topics   • Smoking status: Never Smoker   • Smokeless tobacco: Never Used   • Alcohol use No   • Drug use: No   • Sexual activity: Not Currently     Other Topics Concern   • Not on file     Social History Narrative   • No narrative on file     History   Smoking Status   • Never Smoker   Smokeless Tobacco   • Never Used     History   Alcohol Use No       Allergies/Intolerances:  No Known Allergies    History reviewed with the patient    Other Current Medications:    Current Facility-Administered Medications:   •  polyethylene glycol/lytes (MIRALAX) PACKET 1 Packet, 1 Packet, Oral, DAILY, Alex Hager M.D., 1 Packet at 11/16/17 0810  •  famotidine (PEPCID) tablet 20 mg, 20 mg, Oral, BID, Alex Hager M.D., 20 mg at 11/16/17 0811  •  hyoscyamine-maalox plus-lidocaine viscous (GI COCKTAIL) oral susp 30 mL, 30 mL, Oral, Q6HRS PRN, Alex Hager M.D.  •  azithromycin (ZITHROMAX) tablet 250 mg, 250 mg, Oral, DAILY, Alex Hager M.D., 250 mg at 11/16/17 0811  •  enoxaparin (LOVENOX) inj 30 mg, 30 mg, Subcutaneous, DAILY, Alex Hager M.D., 30 mg at 11/16/17 0811  •  cefTRIAXone in dextrose (ROCEPHIN) IVPB Premix 1 g, 1 g, Intravenous, Q24HRS, Alex Hager M.D., Stopped at 11/15/17 2109  •  0.9 % NaCl with KCl 20 mEq infusion, , Intravenous, Continuous, Alex Hager M.D., Last Rate: 83 mL/hr at 11/16/17 0809  •  sucralfate (CARAFATE) tablet 1 g, 1 g, Oral, Q6HRS, Jabari Ramirez M.D., 1 g at 11/16/17 1311  •  fluticasone (FLONASE) nasal spray 50 mcg, 1 Spray, Nasal, DAILY, Jabari Ramirez M.D., 50 mcg at 11/16/17 0812  •  loratadine (CLARITIN) tablet 10 mg, 10 mg, Oral, DAILY, Jabari Ramirez M.D., 10 mg at 11/16/17 0811  •  senna-docusate (PERICOLACE or SENOKOT S) 8.6-50 MG per tablet 2 Tab, 2 Tab, Oral, BID, 2 Tab at 11/15/17 0837 **AND** polyethylene  "glycol/lytes (MIRALAX) PACKET 1 Packet, 1 Packet, Oral, QDAY PRN **AND** magnesium hydroxide (MILK OF MAGNESIA) suspension 30 mL, 30 mL, Oral, QDAY PRN **AND** bisacodyl (DULCOLAX) suppository 10 mg, 10 mg, Rectal, QDAY PRN, Jabari Ramirez M.D.  •  Respiratory Care per Protocol, , Nebulization, Continuous RT, Jabari Ramirez M.D.  •  NS (BOLUS) infusion 500 mL, 500 mL, Intravenous, Once PRN, Jabari Ramirez M.D.  •  acetaminophen (TYLENOL) tablet 650 mg, 650 mg, Oral, Q6HRS PRN, Jabari Ramirez M.D., 650 mg at 11/15/17 2314  •  labetalol (NORMODYNE,TRANDATE) injection 10 mg, 10 mg, Intravenous, Q4HRS PRN, Jabari Ramirez M.D.  •  ondansetron (ZOFRAN) syringe/vial injection 4 mg, 4 mg, Intravenous, Q4HRS PRN, Jabari Ramirez M.D.  •  ondansetron (ZOFRAN ODT) dispertab 4 mg, 4 mg, Oral, Q4HRS PRN, Jabari Ramirez M.D.      Most Recent Vital Signs:  /78   Pulse 92   Temp 36.9 °C (98.5 °F)   Resp 18   Ht 1.372 m (4' 6\")   Wt 46.7 kg (102 lb 15.3 oz)   SpO2 96%   Breastfeeding? No   BMI 24.82 kg/m²   Temp  Av.2 °C (98.9 °F)  Min: 36.3 °C (97.3 °F)  Max: 38.1 °C (100.5 °F)    Physical Exam:  General: well-appearing, well nourished no acute distress  HEENT: NCAT, PERRLA, sclera anicteric, PERRL, EOMI,  no oral lesions  Neck: supple, no lymphadenopathy  Chest: CTAB, diffuse rhonchi, unlabored.  Cardiac: tachycardic RRR, normal S1 S2, no m/r/g   Abdomen: + bowel sounds, soft, non-tender, non-distended  Extremities: No cyanosis, clubbing + edema, 2+ pulses  Skin: no rashes   Neuro: Alert and oriented times 3, CN intact ORELLANA    Pertinent Lab Results:  Recent Labs      11/14/17   0239  11/15/17   0353  11/16/17   0411   WBC  18.2*  15.3*  15.6*      Recent Labs      11/14/17   0239  11/15/17   0353  11/16/17   0411   HEMOGLOBIN  9.0*  8.9*  9.0*   HEMATOCRIT  27.6*  27.6*  27.7*   MCV  91.4  93.6  92.0   MCH  29.8  30.2  29.9   PLATELETCT  502*  506*  548*         Recent Labs      11/14/17   0239  11/15/17   " 0353  11/16/17   0411   SODIUM  135  137  135   POTASSIUM  3.9  4.3  4.1   CHLORIDE  105  107  103   CO2  24  21  26   CREATININE  0.65  0.62  0.59        Recent Labs      11/13/17   1740   ALBUMIN  3.6        Pertinent Micro:  Results     Procedure Component Value Units Date/Time    ACID FAST STAIN [[id number]] Collected:  11/16/17 0330    Order Status:  Completed Specimen:  Respirate Updated:  11/16/17 0959     Significant Indicator NEG     Source RESP     Site SPUTUM INDUCED     AFB Smear Results Testing in progress.    Narrative:       Airborne,[id number] GABBY MEZA    AFB CULTURE [[id number]] Collected:  11/16/17 0330    Order Status:  Completed Specimen:  Respirate from Sputum Induced Updated:  11/16/17 0959     Significant Indicator NEG     Source RESP     Site SPUTUM INDUCED     AFB Culture Culture in progress.     AFB Smear Results Testing in progress.    Narrative:       Airborne,[id number] GABBY MEZA    URINE CULTURE(NEW) [[id number]] Collected:  11/14/17 1054    Order Status:  Completed Specimen:  Urine from Urine, Clean Catch Updated:  11/16/17 0926     Significant Indicator NEG     Source UR     Site URINE, CLEAN CATCH     Urine Culture Mixed skin janice 10-50,000 cfu/mL    Narrative:       Airborne,[id number] TEO JOY  Indication for culture:->Emergency Room Patient    CULTURE RESPIRATORY W/ GRM STN [[id number]] Collected:  11/14/17 2135    Order Status:  Completed Specimen:  Respirate from Sputum Induced Updated:  11/16/17 0910     Gram Stain Result --     Specimen Quality Score: 1+  Moderate WBCs.  Few Yeast.  Many mixed bacteria, no predominant organism seen.       Significant Indicator NEG     Source RESP     Site SPUTUM INDUCED     Respiratory Culture --     Moderate growth usual upper respiratory janice  including yeast.      ACID FAST STAIN [[id number]] Collected:  11/14/17 1130    Order Status:  Completed Specimen:  Respirate Updated:  11/15/17 1826      Significant Indicator NEG     Source RESP     Site Sputum (coughed)     AFB Smear Results No acid fast bacilli seen.    Narrative:       Airborne,[id number] MUHAMMAD-PRÉEZ LIO B    AFB CULTURE [[id number]] Collected:  11/14/17 1130    Order Status:  Completed Specimen:  Respirate from Sputum Induced Updated:  11/15/17 1826     Significant Indicator NEG     Source RESP     Site Sputum (coughed)     AFB Culture Culture in progress.     AFB Smear Results No acid fast bacilli seen.    Narrative:       Airborne,[id number] MUHAMMAD-PÉREZ LIO B    ACID FAST STAIN [[id number]] Collected:  11/14/17 2135    Order Status:  Completed Specimen:  Respirate Updated:  11/15/17 1826     Significant Indicator NEG     Source RESP     Site SPUTUM INDUCED     AFB Smear Results No acid fast bacilli seen.    Narrative:       Airborne,Droplet    AFB CULTURE [[id number]] Collected:  11/14/17 2135    Order Status:  Completed Specimen:  Respirate from Sputum Induced Updated:  11/15/17 1825     Significant Indicator NEG     Source RESP     Site SPUTUM INDUCED     AFB Culture Culture in progress.     AFB Smear Results No acid fast bacilli seen.    Narrative:       Airborne,Droplet    GRAM STAIN [[id number]] Collected:  11/14/17 2135    Order Status:  Completed Specimen:  Respirate Updated:  11/15/17 0836     Significant Indicator .     Source RESP     Site SPUTUM INDUCED     Gram Stain Result --     Specimen Quality Score: 1+  Moderate WBCs.  Few Yeast.  Many mixed bacteria, no predominant organism seen.      CULTURE RESPIRATORY W/ GRM STN [[id number]] Collected:  11/14/17 1130    Order Status:  Canceled Specimen:  Other from Sputum Induced Updated:  11/14/17 1322    URINALYSIS [[id number]]  (Abnormal) Collected:  11/14/17 1054    Order Status:  Completed Specimen:  Urine from Urine, Clean Catch Updated:  11/14/17 1148     Color Yellow     Character Clear     Specific Gravity 1.013     Ph 5.5     Glucose Negative mg/dL      Ketones Negative  "mg/dL      Protein Negative mg/dL      Bilirubin Negative     Urobilinogen, Urine 0.2     Nitrite Negative     Leukocyte Esterase Small (A)     Occult Blood Small (A)     Micro Urine Req Microscopic    Narrative:       Airborne,[id number] TEO JOY  Indication for culture:->Emergency Room Patient    BLOOD CULTURE [[id number]] Collected:  11/13/17 2030    Order Status:  Completed Specimen:  Blood from Peripheral Updated:  11/14/17 0713     Significant Indicator NEG     Source BLD     Site PERIPHERAL     Blood Culture --     No Growth    Note: Blood cultures are incubated for 5 days and  are monitored continuously.Positive blood cultures  are called to the RN and reported as soon as  they are identified.      Narrative:       Per Hospital Policy: Only change Specimen Src: to \"Line\" if  specified by physician order.    BLOOD CULTURE [[id number]] Collected:  11/13/17 1740    Order Status:  Completed Specimen:  Blood from Peripheral Updated:  11/14/17 0713     Significant Indicator NEG     Source BLD     Site PERIPHERAL     Blood Culture --     No Growth    Note: Blood cultures are incubated for 5 days and  are monitored continuously.Positive blood cultures  are called to the RN and reported as soon as  they are identified.      Narrative:       Per Hospital Policy: Only change Specimen Src: to \"Line\" if  specified by physician order.    Blood Culture [[id number]]     Order Status:  Sent Specimen:  Blood from Peripheral     Blood Culture [[id number]]     Order Status:  Sent Specimen:  Blood from Peripheral     URINALYSIS [[id number]]     Order Status:  Canceled Specimen:  Urine     URINE CULTURE(NEW) [[id number]]     Order Status:  Canceled Specimen:  Urine         Blood Culture   Date Value Ref Range Status   11/13/2017   Preliminary    No Growth    Note: Blood cultures are incubated for 5 days and  are monitored continuously.Positive blood cultures  are called to the RN and reported as soon as  they are " identified.          Studies:    IMPRESSION:   Pneumonia  Suspect case TB      PLAN:   Community acquired pneumonia- (present on admission)  Low grade fever  Resolving leukocytosis  Work up neg so far  History, clinical course, and CXR concerning for TB   Continue isolation while in hosp  FU AFB cxs  Complete course of treatment for CAP  CT chest  Pulm eval and bronch       Thrombocytosis  Acute phase reactant  Should resolve with treatment infection    Sepsis (CMS-HCC)- (present on admission)  Improved  Abx as above  Blood cxs neg  Ucx neg    Plan of care discussed with IM Alex Hager M.D.. Will continue to follow    Leida Frye M.D.

## 2017-11-16 NOTE — PROGRESS NOTES
Vicky Chen Fall Risk Assessment:     Last Known Fall: Within the last six months  Mobility: Dizziness/generalized weakness  Medications: No meds  Mental Status/LOC/Awareness: Awake, alert, and oriented to date, place, and person  Toileting Needs: No needs  Volume/Electrolyte Status: Use of IV fluids/tube feeds  Communication/Sensory: Non-English patient/unable to speak/slurred speech  Behavior: Appropriate behavior  Vicky Chen Fall Risk Total: 10  Fall Risk Level: LOW RISK    Universal Fall Precautions:  call light/belongings in reach, bed in low position and locked, siderails up x 2, use non-slip footwear, adequate lighting, clutter free and spill free environment, educate on level of risk, educate to call for assistance    Fall Risk Level Interventions:   TRIAL ([employer], NEURO, MED EMIL 5) Low Fall Risk Interventions  Place yellow fall risk ID band on patient: verified  Provide patient/family education based on risk assessment: completed  Educate patient/family to call staff for assistance when getting out of bed: completed  Place fall precaution signage outside patient door: verifiedTRIAL ([employer], NEURO, MED EMIL 5) Moderate Fall Risk Interventions  Place yellow fall risk ID band on patient: verified  Provide patient/family education based on risk assessment : verified  Educate patient/family to call staff for assistance when getting out of bed: verified  Place fall precaution signage outside patient door: verified  Utilize bed/chair fall alarm: verified     Patient Specific Interventions:     Medication: review medications with patient and family and limit combination of prn medications  Mental Status/LOC/Awareness: check on patient hourly and reinforce the use of call light  Toileting: provide frquent toileting  Volume/Electrolyte Status: ensure patient remains hydrated, monitor abnormal lab values and ensure IV fluids are appropriate  Communication/Sensory: update plan of care on whiteboard  Behavioral:  not applicable  Mobility: ensure bed is locked and in lowest position

## 2017-11-16 NOTE — CARE PLAN
Problem: Infection  Goal: Will remain free from infection  Outcome: PROGRESSING AS EXPECTED  Ruleout TB, AFB samples pending; proper isolation precautions in place; IV and PO abx

## 2017-11-16 NOTE — CARE PLAN
Problem: Communication  Goal: The ability to communicate needs accurately and effectively will improve    Intervention: [place] patient and significant other/support system to call light to alert staff of needs  Call for assistance, understands plan of care, and medication rountine

## 2017-11-16 NOTE — PROGRESS NOTES
Bedside report received from Kenroy DRAKE. Patient's chart and MAR reviewed. 12 hour chart check complete. Pt is awake in bed. Pt is AOx4. Patient was updated on plan of care for the day. Questions answered and concerns addressed.  Pt denies any additional needs at this time. White board updated. Call light and personal belongings within reach, bed in lowest position.

## 2017-11-16 NOTE — PROGRESS NOTES
0700 No acute distress. Aaox4. Resp even and nonlabored. Bed in lowest and locked position. Side rails up x2. Call light within reach.     1800 No acute distress. Aaox4. Resp even and nonlabored. Bed in lowest and locked position. Side rails up x2. Call light within reach.

## 2017-11-17 LAB
BASOPHILS # BLD AUTO: 0.3 % (ref 0–1.8)
BASOPHILS # BLD: 0.04 K/UL (ref 0–0.12)
CRP SERPL HS-MCNC: 11.2 MG/DL (ref 0–0.75)
EOSINOPHIL # BLD AUTO: 0.34 K/UL (ref 0–0.51)
EOSINOPHIL NFR BLD: 2.6 % (ref 0–6.9)
ERYTHROCYTE [DISTWIDTH] IN BLOOD BY AUTOMATED COUNT: 41.7 FL (ref 35.9–50)
ERYTHROCYTE [SEDIMENTATION RATE] IN BLOOD BY WESTERGREN METHOD: 118 MM/HOUR (ref 0–30)
HCT VFR BLD AUTO: 27.1 % (ref 37–47)
HGB BLD-MCNC: 9 G/DL (ref 12–16)
IMM GRANULOCYTES # BLD AUTO: 0.29 K/UL (ref 0–0.11)
IMM GRANULOCYTES NFR BLD AUTO: 2.2 % (ref 0–0.9)
L PNEUMO IGG TITR SER IF: NORMAL {TITER}
LYMPHOCYTES # BLD AUTO: 2.7 K/UL (ref 1–4.8)
LYMPHOCYTES NFR BLD: 20.5 % (ref 22–41)
MCH RBC QN AUTO: 30.2 PG (ref 27–33)
MCHC RBC AUTO-ENTMCNC: 33.2 G/DL (ref 33.6–35)
MCV RBC AUTO: 90.9 FL (ref 81.4–97.8)
MONOCYTES # BLD AUTO: 0.93 K/UL (ref 0–0.85)
MONOCYTES NFR BLD AUTO: 7.1 % (ref 0–13.4)
NEUTROPHILS # BLD AUTO: 8.88 K/UL (ref 2–7.15)
NEUTROPHILS NFR BLD: 67.3 % (ref 44–72)
NRBC # BLD AUTO: 0 K/UL
NRBC BLD AUTO-RTO: 0 /100 WBC
PLATELET # BLD AUTO: 582 K/UL (ref 164–446)
PMV BLD AUTO: 8.6 FL (ref 9–12.9)
RBC # BLD AUTO: 2.98 M/UL (ref 4.2–5.4)
WBC # BLD AUTO: 13.2 K/UL (ref 4.8–10.8)

## 2017-11-17 PROCEDURE — 700102 HCHG RX REV CODE 250 W/ 637 OVERRIDE(OP): Performed by: INTERNAL MEDICINE

## 2017-11-17 PROCEDURE — 86140 C-REACTIVE PROTEIN: CPT

## 2017-11-17 PROCEDURE — 85652 RBC SED RATE AUTOMATED: CPT

## 2017-11-17 PROCEDURE — 700102 HCHG RX REV CODE 250 W/ 637 OVERRIDE(OP): Performed by: HOSPITALIST

## 2017-11-17 PROCEDURE — 85025 COMPLETE CBC W/AUTO DIFF WBC: CPT

## 2017-11-17 PROCEDURE — 700101 HCHG RX REV CODE 250: Performed by: HOSPITALIST

## 2017-11-17 PROCEDURE — 770027 HCHG ROOM/CARE - NEGATIVE PRESSURE ISOLATION

## 2017-11-17 PROCEDURE — 36415 COLL VENOUS BLD VENIPUNCTURE: CPT

## 2017-11-17 PROCEDURE — A9270 NON-COVERED ITEM OR SERVICE: HCPCS | Performed by: INTERNAL MEDICINE

## 2017-11-17 PROCEDURE — A9270 NON-COVERED ITEM OR SERVICE: HCPCS | Performed by: HOSPITALIST

## 2017-11-17 PROCEDURE — 700111 HCHG RX REV CODE 636 W/ 250 OVERRIDE (IP): Performed by: HOSPITALIST

## 2017-11-17 PROCEDURE — 99232 SBSQ HOSP IP/OBS MODERATE 35: CPT | Performed by: HOSPITALIST

## 2017-11-17 RX ORDER — SODIUM CHLORIDE FOR INHALATION 7 %
4 VIAL, NEBULIZER (ML) INHALATION
Status: DISPENSED | OUTPATIENT
Start: 2017-11-17 | End: 2017-11-18

## 2017-11-17 RX ADMIN — SUCRALFATE 1 G: 1 TABLET ORAL at 20:41

## 2017-11-17 RX ADMIN — SUCRALFATE 1 G: 1 TABLET ORAL at 12:56

## 2017-11-17 RX ADMIN — CEFTRIAXONE 1 G: 1 INJECTION, SOLUTION INTRAVENOUS at 20:42

## 2017-11-17 RX ADMIN — ENOXAPARIN SODIUM 30 MG: 100 INJECTION SUBCUTANEOUS at 08:54

## 2017-11-17 RX ADMIN — FLUTICASONE PROPIONATE 50 MCG: 50 SPRAY, METERED NASAL at 08:53

## 2017-11-17 RX ADMIN — POTASSIUM CHLORIDE AND SODIUM CHLORIDE: 900; 150 INJECTION, SOLUTION INTRAVENOUS at 20:41

## 2017-11-17 RX ADMIN — SUCRALFATE 1 G: 1 TABLET ORAL at 05:22

## 2017-11-17 RX ADMIN — FAMOTIDINE 20 MG: 20 TABLET, FILM COATED ORAL at 20:42

## 2017-11-17 RX ADMIN — AZITHROMYCIN 250 MG: 250 TABLET, FILM COATED ORAL at 08:53

## 2017-11-17 RX ADMIN — LORATADINE 10 MG: 10 TABLET ORAL at 08:53

## 2017-11-17 RX ADMIN — FAMOTIDINE 20 MG: 20 TABLET, FILM COATED ORAL at 08:54

## 2017-11-17 RX ADMIN — POTASSIUM CHLORIDE AND SODIUM CHLORIDE: 900; 150 INJECTION, SOLUTION INTRAVENOUS at 08:57

## 2017-11-17 ASSESSMENT — ENCOUNTER SYMPTOMS
SPUTUM PRODUCTION: 1
ABDOMINAL PAIN: 1
NEUROLOGICAL NEGATIVE: 1
CARDIOVASCULAR NEGATIVE: 1
PALPITATIONS: 0
DIARRHEA: 0
VOMITING: 0
CHILLS: 0
COUGH: 1
NAUSEA: 0
SHORTNESS OF BREATH: 1
HEADACHES: 0
PSYCHIATRIC NEGATIVE: 1
SHORTNESS OF BREATH: 0
MUSCULOSKELETAL NEGATIVE: 1
BRUISES/BLEEDS EASILY: 0
POLYDIPSIA: 0
BACK PAIN: 0
NAUSEA: 1
FEVER: 1
ABDOMINAL PAIN: 0
WEAKNESS: 1
EYES NEGATIVE: 1
DEPRESSION: 0
WEIGHT LOSS: 1
FOCAL WEAKNESS: 0
FEVER: 0
DIZZINESS: 0
HEARTBURN: 0
WEAKNESS: 0
NECK PAIN: 0

## 2017-11-17 ASSESSMENT — PAIN SCALES - GENERAL
PAINLEVEL_OUTOF10: 0

## 2017-11-17 NOTE — PROGRESS NOTES
[facility] Hospitalist Progress Note    Date of Service: 2017    Chief Complaint  77 y.o. female admitted 2017 with O/p Dx'd possible pneumonia/ Allergic Rxn  Admitted with PNA and r/o TB    Interval Problem Update  Patient seen and examined today.    Patient tolerating treatment and therapies.  All Data, Medication data reviewed.  Case discussed with nursing as available.  Plan of Care reviewed with patient and notified of changes.   Pt feels better, less fever, stil sob and rales, c/o epigastric pain and constipation  11/15 Pt feels some better, with some temp and diaphoresis last night, abdomen better, CX's still pending   Pt with persistent abnormal lung exam , labs better, no F/C, ID eval requested  CT chest  Consultants/Specialty  ID    Disposition  TBA        Review of Systems   Constitutional: Positive for fever.   HENT: Negative.    Eyes: Negative.    Respiratory: Positive for cough, sputum production and shortness of breath.    Cardiovascular: Negative.  Negative for palpitations.   Gastrointestinal: Positive for abdominal pain and nausea. Negative for heartburn and vomiting.   Genitourinary: Negative.  Negative for dysuria and frequency.   Musculoskeletal: Negative.  Negative for back pain and neck pain.   Skin: Negative.  Negative for itching and rash.   Neurological: Negative.  Negative for dizziness, focal weakness, weakness and headaches.   Endo/Heme/Allergies: Negative.  Negative for polydipsia. Does not bruise/bleed easily.   Psychiatric/Behavioral: Negative.  Negative for depression.   All other systems reviewed and are negative.     Physical Exam  Laboratory/Imaging   Hemodynamics  Temp (24hrs), Av.2 °C (98.9 °F), Min:36.6 °C (97.8 °F), Max:38.1 °C (100.5 °F)   Temperature: 36.9 °C (98.5 °F)  Pulse  Av.7  Min: 83  Max: 114   Blood Pressure : 133/77      Respiratory      Respiration: 16, Pulse Oximetry: 92 %     Work Of Breathing / Effort: Mild  RUL Breath Sounds:  Diminished, RML Breath Sounds: Diminished, RLL Breath Sounds: Diminished, RENEE Breath Sounds: Diminished, LLL Breath Sounds: Diminished    Fluids    Intake/Output Summary (Last 24 hours) at 11/16/17 1737  Last data filed at 11/15/17 2000   Gross per 24 hour   Intake              120 ml   Output              200 ml   Net              -80 ml       Nutrition  Orders Placed This Encounter   Procedures   • Diet Order     Standing Status:   Standing     Number of Occurrences:   1     Order Specific Question:   Diet:     Answer:   Regular [1]     Physical Exam   Constitutional: She is oriented to person, place, and time. She appears well-developed and well-nourished.   HENT:   Head: Normocephalic and atraumatic.   Nose: Nose normal.   Mouth/Throat: Oropharynx is clear and moist.   Eyes: Conjunctivae and EOM are normal. Pupils are equal, round, and reactive to light.   Neck: Normal range of motion. Neck supple. No JVD present. No thyromegaly present.   Cardiovascular: Normal rate, regular rhythm and normal heart sounds.  Exam reveals no gallop and no friction rub.    Pulmonary/Chest: Effort normal. She has rales.   Abdominal: Soft. Bowel sounds are normal. She exhibits no distension and no mass. There is tenderness. There is no rebound and no guarding.   Musculoskeletal: Normal range of motion. She exhibits no edema or tenderness.   Lymphadenopathy:     She has no cervical adenopathy.   Neurological: She is alert and oriented to person, place, and time. No cranial nerve deficit.   Skin: Skin is warm and dry. She is not diaphoretic.   Psychiatric: She has a normal mood and affect. Her behavior is normal.   Nursing note and vitals reviewed.      Recent Labs      11/14/17   0239  11/15/17   0353  11/16/17   0411   WBC  18.2*  15.3*  15.6*   RBC  3.02*  2.95*  3.01*   HEMOGLOBIN  9.0*  8.9*  9.0*   HEMATOCRIT  27.6*  27.6*  27.7*   MCV  91.4  93.6  92.0   MCH  29.8  30.2  29.9   MCHC  32.6*  32.2*  32.5*   RDW  42.3  44.5  42.2    PLATELETCT  502*  506*  548*   MPV  9.1  8.5*  8.7*     Recent Labs      11/14/17   0239  11/15/17   0353  11/16/17   0411   SODIUM  135  137  135   POTASSIUM  3.9  4.3  4.1   CHLORIDE  105  107  103   CO2  24  21  26   GLUCOSE  105*  92  92   BUN  16  6*  5*   CREATININE  0.65  0.62  0.59   CALCIUM  8.2*  8.3*  8.8         Recent Labs      11/15/17   0353   BNPBTYPENAT  286*              Assessment/Plan     Community acquired pneumonia- (present on admission)   Assessment & Plan    Unclear pathogen  Travel through [facility]  eval for TB and atypicals        Anemia- (present on admission)   Assessment & Plan    Iron deficiency  From ? Uterine prolapse        Sepsis (CMS-HCC)- (present on admission)   Assessment & Plan    Mild/simple, c/w measures            Reviewed items::  Medications reviewed, Labs reviewed and Radiology images reviewed  Wilson catheter::  No Wilson  DVT prophylaxis pharmacological::  Enoxaparin (Lovenox)  DVT prophylaxis - mechanical:  SCDs  Antibiotics:  Treating active infection/contamination beyond 24 hours perioperative coverage      Plan  C/w emp. Tx  eval for anemia resulted in Iron needs, replace  R/o TB f/u in progress  ID eval  CT Chest  Resp. Care  Consider Pulm eval and Bronch  See orders

## 2017-11-17 NOTE — PROGRESS NOTES
Infectious Disease Progress Note    Author: Leida Frye M.D. Date & Time of service: 2017  3:57 PM    Chief Complaint:  FU PNA    Interval History:   77 y.o. English admitted 2017 for pneumonia   AF (99.9) WBC 13.2 minimal cough-denies SE abx  Labs Reviewed, Medications Reviewed and Radiology Reviewed.    Review of Systems:  Review of Systems   Constitutional: Positive for weight loss. Negative for chills and fever.   Respiratory: Positive for cough. Negative for shortness of breath.    Cardiovascular: Positive for chest pain.        With cough   Gastrointestinal: Negative for abdominal pain, diarrhea, nausea and vomiting.   Neurological: Positive for weakness.       Hemodynamics:  Temp (24hrs), Av.3 °C (99.2 °F), Min:36.9 °C (98.5 °F), Max:37.7 °C (99.9 °F)  Temperature: 37 °C (98.6 °F)  Pulse  Av.4  Min: 83  Max: 114  Blood Pressure : 131/82       Physical Exam:  Physical Exam   Constitutional: She is oriented to person, place, and time. She appears well-developed. No distress.   HENT:   Head: Normocephalic and atraumatic.   Eyes: EOM are normal. Pupils are equal, round, and reactive to light.   Cardiovascular: Normal rate and regular rhythm.    Pulmonary/Chest: Effort normal. No respiratory distress. She has no wheezes. She has rales.   Abdominal: Soft. She exhibits no distension. There is no tenderness.   Musculoskeletal: She exhibits edema.   Neurological: She is alert and oriented to person, place, and time.   Skin: No rash noted.   Nursing note and vitals reviewed.      Meds:    Current Facility-Administered Medications:   •  sodium chloride  •  polyethylene glycol/lytes  •  famotidine  •  hyoscyamine-maalox plus-lidocaine viscous  •  enoxaparin (LOVENOX) injection  •  cefTRIAXone (ROCEPHIN) IV  •  0.9 % NaCl with KCl 20 mEq 1,000 mL  •  sucralfate  •  fluticasone  •  loratadine  •  senna-docusate **AND** polyethylene glycol/lytes **AND** magnesium hydroxide **AND**  bisacodyl  •  Respiratory Care per Protocol  •  NS  •  acetaminophen  •  labetalol  •  ondansetron  •  ondansetron    Labs:  Recent Labs      11/15/17   0353  11/16/17   0411  11/17/17   0441   WBC  15.3*  15.6*  13.2*   RBC  2.95*  3.01*  2.98*   HEMOGLOBIN  8.9*  9.0*  9.0*   HEMATOCRIT  27.6*  27.7*  27.1*   MCV  93.6  92.0  90.9   MCH  30.2  29.9  30.2   RDW  44.5  42.2  41.7   PLATELETCT  506*  548*  582*   MPV  8.5*  8.7*  8.6*   NEUTSPOLYS   --    --   67.30   LYMPHOCYTES   --    --   20.50*   MONOCYTES   --    --   7.10   EOSINOPHILS   --    --   2.60   BASOPHILS   --    --   0.30     Recent Labs      11/15/17   0353  11/16/17   0411   SODIUM  137  135   POTASSIUM  4.3  4.1   CHLORIDE  107  103   CO2  21  26   GLUCOSE  92  92   BUN  6*  5*     Recent Labs      11/15/17   0353  11/16/17   0411   CREATININE  0.62  0.59       Imaging:  Ct-chest (thorax) With    Result Date: 11/16/2017 11/16/2017 6:55 PM HISTORY/REASON FOR EXAM:  Shortness of breath fever cough with history of pneumonia TECHNIQUE/EXAM DESCRIPTION: CT scan of the chest with contrast. Thin-section helical images were obtained from the lung apices through the adrenal glands following the bolus administration of 80ml mL of Optiray 350 nonionic contrast. Low dose optimization technique was utilized for this CT exam including automated exposure control and adjustment of the mA and/or kV according to patient size. COMPARISON:  None FINDINGS: Extensive consolidation is noted in the right upper lobe extending to the right lung apex. Surrounding areas of groundglass opacification are noted in the right upper lobe. Additional scattered multifocal irregular shaped and nodular opacifications with ill-defined borders are noted in both lungs. Bronchiectasis is also present bilaterally. No pleural effusions are identified. There is no pneumothorax. Mild upper mediastinal and pretracheal adenopathy is noted. There is no evidence of pericardial effusion. No  large masses are seen within the upper abdomen.     1.  Consolidation is identified which is prominent in the right lung apex. Findings could be due to pneumonia. Tuberculosis is a possibility. 2.  Surrounding groundglass opacification is also noted in the right upper lobe. 3.  Additional scattered multifocal opacifications are noted in each lung. Bronchiectasis is also present. Findings are likely due to infectious or inflammatory process. 4.  Mildly enlarged lymph nodes are noted in the upper mediastinum and pretracheal area.     Dx-chest-2 Views    Result Date: 11/11/2017 11/11/2017 10:22 AM HISTORY/REASON FOR EXAM:  Shortness of Breath Cough, intermittent fever TECHNIQUE/EXAM DESCRIPTION AND NUMBER OF VIEWS: Two views of the chest. COMPARISON:  None available. FINDINGS: Cardiac contour is mildly prominent. Patchy parenchymal and interstitial opacities in both lungs. Focal opacity at the RIGHT lung apex. No pleural fluid collection or pneumothorax. Degenerative change of thoracic spine.     1.  Patchy interstitial and alveolar opacities in both lungs, likely inflammatory. 2.  Focal opacity RIGHT lung apex may represent mass or focal inflammatory process. 3.  Tuberculosis is not excluded.    Dx-chest-portable (1 View)    Result Date: 11/13/2017 11/13/2017 8:14 PM HISTORY/REASON FOR EXAM:  Possible sepsis TECHNIQUE/EXAM DESCRIPTION:  Single AP view of the chest. COMPARISON: Today at 1012 FINDINGS: The cardiac silhouette appears within normal limits. Atherosclerotic calcification of the aorta is noted.  The central pulmonary vasculature appears normal. The lungs appear well expanded bilaterally.  Scattered patchy bilateral pulmonary opacities are seen, greatest in the right apex. No significant pleural effusions are identified. The bony structures appear age-appropriate.     1.  Patchy bilateral pulmonary opacities, greatest in the right apex, stable since prior, most compatible with infiltrates. Radiographic  follow-up to resolution recommended. As previously recommended exclusion of tuberculosis as clinically warranted. 2.  Atherosclerosis      Micro:  Results     Procedure Component Value Units Date/Time    CULTURE RESPIRATORY W/ GRM STN [[id number]]     Order Status:  Completed Specimen:  Respirate from Sputum Induced     CULTURE RESPIRATORY W/ GRM STN [[id number]]     Order Status:  Completed Specimen:  Respirate from Sputum Induced     CULTURE RESPIRATORY W/ GRM STN [[id number]]     Order Status:  Completed Specimen:  Respirate from Sputum Induced     AFB CULTURE [[id number]] Collected:  11/16/17 0330    Order Status:  Completed Specimen:  Respirate from Sputum Induced Updated:  11/16/17 1606     Significant Indicator NEG     Source RESP     Site SPUTUM INDUCED     AFB Culture Culture in progress.     AFB Smear Results No acid fast bacilli seen.    Narrative:       Airborne,[id number] GABBY MEZA    ACID FAST STAIN [[id number]] Collected:  11/16/17 0330    Order Status:  Completed Specimen:  Respirate Updated:  11/16/17 1606     Significant Indicator NEG     Source RESP     Site SPUTUM INDUCED     AFB Smear Results No acid fast bacilli seen.    Narrative:       Airborne,[id number] GABBY MEZA    URINE CULTURE(NEW) [[id number]] Collected:  11/14/17 1054    Order Status:  Completed Specimen:  Urine from Urine, Clean Catch Updated:  11/16/17 0926     Significant Indicator NEG     Source UR     Site URINE, CLEAN CATCH     Urine Culture Mixed skin janice 10-50,000 cfu/mL    Narrative:       Airborne,[id number] TEO JOY  Indication for culture:->Emergency Room Patient    CULTURE RESPIRATORY W/ GRM STN [[id number]] Collected:  11/14/17 2135    Order Status:  Completed Specimen:  Respirate from Sputum Induced Updated:  11/16/17 0910     Gram Stain Result --     Specimen Quality Score: 1+  Moderate WBCs.  Few Yeast.  Many mixed bacteria, no predominant organism seen.       Significant Indicator  NEG     Source RESP     Site SPUTUM INDUCED     Respiratory Culture --     Moderate growth usual upper respiratory janice  including yeast.      ACID FAST STAIN [[id number]] Collected:  11/14/17 1130    Order Status:  Completed Specimen:  Respirate Updated:  11/15/17 1826     Significant Indicator NEG     Source RESP     Site Sputum (coughed)     AFB Smear Results No acid fast bacilli seen.    Narrative:       Airborne,[id number] MUHAMMAD-PÉREZ LIO B    AFB CULTURE [[id number]] Collected:  11/14/17 1130    Order Status:  Completed Specimen:  Respirate from Sputum Induced Updated:  11/15/17 1826     Significant Indicator NEG     Source RESP     Site Sputum (coughed)     AFB Culture Culture in progress.     AFB Smear Results No acid fast bacilli seen.    Narrative:       Airborne,[id number] MUHAMMAD-PÉREZ LIO B    ACID FAST STAIN [[id number]] Collected:  11/14/17 2135    Order Status:  Completed Specimen:  Respirate Updated:  11/15/17 1826     Significant Indicator NEG     Source RESP     Site SPUTUM INDUCED     AFB Smear Results No acid fast bacilli seen.    Narrative:       Airborne,Droplet    AFB CULTURE [[id number]] Collected:  11/14/17 2135    Order Status:  Completed Specimen:  Respirate from Sputum Induced Updated:  11/15/17 1825     Significant Indicator NEG     Source RESP     Site SPUTUM INDUCED     AFB Culture Culture in progress.     AFB Smear Results No acid fast bacilli seen.    Narrative:       Airborne,Droplet    GRAM STAIN [[id number]] Collected:  11/14/17 2135    Order Status:  Completed Specimen:  Respirate Updated:  11/15/17 0836     Significant Indicator .     Source RESP     Site SPUTUM INDUCED     Gram Stain Result --     Specimen Quality Score: 1+  Moderate WBCs.  Few Yeast.  Many mixed bacteria, no predominant organism seen.      CULTURE RESPIRATORY W/ GRM STN [[id number]] Collected:  11/14/17 1130    Order Status:  Canceled Specimen:  Other from Sputum Induced Updated:  11/14/17 1322     "URINALYSIS [[id number]]  (Abnormal) Collected:  11/14/17 1054    Order Status:  Completed Specimen:  Urine from Urine, Clean Catch Updated:  11/14/17 1148     Color Yellow     Character Clear     Specific Gravity 1.013     Ph 5.5     Glucose Negative mg/dL      Ketones Negative mg/dL      Protein Negative mg/dL      Bilirubin Negative     Urobilinogen, Urine 0.2     Nitrite Negative     Leukocyte Esterase Small (A)     Occult Blood Small (A)     Micro Urine Req Microscopic    Narrative:       Airborne,[id number] TEO JOY  Indication for culture:->Emergency Room Patient    BLOOD CULTURE [[id number]] Collected:  11/13/17 2030    Order Status:  Completed Specimen:  Blood from Peripheral Updated:  11/14/17 0713     Significant Indicator NEG     Source BLD     Site PERIPHERAL     Blood Culture --     No Growth    Note: Blood cultures are incubated for 5 days and  are monitored continuously.Positive blood cultures  are called to the RN and reported as soon as  they are identified.      Narrative:       Per Hospital Policy: Only change Specimen Src: to \"Line\" if  specified by physician order.    BLOOD CULTURE [[id number]] Collected:  11/13/17 1740    Order Status:  Completed Specimen:  Blood from Peripheral Updated:  11/14/17 0713     Significant Indicator NEG     Source BLD     Site PERIPHERAL     Blood Culture --     No Growth    Note: Blood cultures are incubated for 5 days and  are monitored continuously.Positive blood cultures  are called to the RN and reported as soon as  they are identified.      Narrative:       Per Hospital Policy: Only change Specimen Src: to \"Line\" if  specified by physician order.    URINALYSIS [[id number]]     Order Status:  Canceled Specimen:  Urine     URINE CULTURE(NEW) [[id number]]     Order Status:  Canceled Specimen:  Urine     Blood Culture [[id number]] Collected:  11/13/17 0000    Order Status:  Canceled Specimen:  Other from Peripheral     Blood Culture [[id number]] " Collected:  11/13/17 0000    Order Status:  Canceled Specimen:  Other from Peripheral           Assessment:  Active Hospital Problems    Diagnosis   • Community acquired pneumonia [J18.9]   • Anemia [D64.9]   • Sepsis (CMS-HCC) [A41.9]     Plan:  Pneumonia-concern for TB  Low grade fever  Resolving leukocytosis  Work up neg so far  History, clinical course, and CXR concerning for TB   CT chest c/w TB-consolidation RUL +LAD  Pulm eval and bronch  Continue isolation while in hosp  FU AFB cxs  Complete course of treatment for CAP    Thrombocytosis  Acute phase reactant  Should resolve with treatment infection    Sepsis (CMS-HCC)- (present on admission)  Improved  Abx as above  Blood cxs neg  Ucx neg    Plan of care discussed with ADAN Hager

## 2017-11-17 NOTE — PROGRESS NOTES
Report received, pt care assumed.  Pt resting comfortably in bed, denies needs.  POC reviewed with pt, pt denies questions.  Bed in low position, call light in reach, bed alarm declined, will continue to monitor.

## 2017-11-17 NOTE — CARE PLAN
Problem: Infection  Goal: Will remain free from infection  Outcome: PROGRESSING AS EXPECTED  Proper isolation precautions in place, infection prevention techniques in place; AFB cultures negative, PO zithro and IV rocephin

## 2017-11-17 NOTE — PROGRESS NOTES
Vicky Chen Fall Risk Assessment:     Last Known Fall: Within the last six months  Mobility: No limitations  Medications: Cardiovascular or central nervous system meds  Mental Status/LOC/Awareness: Awake, alert, and oriented to date, place, and person  Toileting Needs: No needs  Volume/Electrolyte Status: Use of IV fluids/tube feeds  Communication/Sensory: Visual (Glasses)/hearing deficit  Behavior: Appropriate behavior  Vicky Chen Fall Risk Total: 9  Fall Risk Level: LOW RISK    Universal Fall Precautions:  call light/belongings in reach, bed in low position and locked, siderails up x 2, use non-slip footwear, adequate lighting, clutter free and spill free environment, educate on level of risk, educate to call for assistance    Fall Risk Level Interventions:   TRIAL ([employer], NEURO, MED EMIL 5) Low Fall Risk Interventions  Place yellow fall risk ID band on patient: verified  Provide patient/family education based on risk assessment: completed  Educate patient/family to call staff for assistance when getting out of bed: completed  Place fall precaution signage outside patient door: verifiedTRIAL ([employer], NEURO, MED EMIL 5) Moderate Fall Risk Interventions  Place yellow fall risk ID band on patient: verified  Provide patient/family education based on risk assessment : verified  Educate patient/family to call staff for assistance when getting out of bed: verified  Place fall precaution signage outside patient door: verified  Utilize bed/chair fall alarm: verified     Patient Specific Interventions:     Medication: review medications with patient and family and limit combination of prn medications  Mental Status/LOC/Awareness: check on patient hourly and reinforce the use of call light  Toileting: provide frquent toileting  Volume/Electrolyte Status: ensure patient remains hydrated, monitor abnormal lab values and ensure IV fluids are appropriate  Communication/Sensory: update plan of care on  whiteboard  Behavioral: not applicable  Mobility: ensure bed is locked and in lowest position

## 2017-11-17 NOTE — PROGRESS NOTES
Bedside report received from Maria Dolores DRAKE. Patient's chart and MAR reviewed. 12 hour chart check complete. Patient was updated on plan of care for the day. Questions answered and concerns addressed. Pt to CT with transport at this time

## 2017-11-18 ENCOUNTER — APPOINTMENT (OUTPATIENT)
Dept: RADIOLOGY | Facility: MEDICAL CENTER | Age: 77
End: 2017-11-18
Attending: NURSE PRACTITIONER
Payer: MEDICARE

## 2017-11-18 LAB
ALBUMIN SERPL BCP-MCNC: 3.1 G/DL (ref 3.2–4.9)
ALBUMIN/GLOB SERPL: 0.7 G/DL
ALP SERPL-CCNC: 89 U/L (ref 30–99)
ALT SERPL-CCNC: 15 U/L (ref 2–50)
ANION GAP SERPL CALC-SCNC: 11 MMOL/L (ref 0–11.9)
AST SERPL-CCNC: 16 U/L (ref 12–45)
BACTERIA BLD CULT: NORMAL
BACTERIA BLD CULT: NORMAL
BASOPHILS # BLD AUTO: 0.6 % (ref 0–1.8)
BASOPHILS # BLD: 0.09 K/UL (ref 0–0.12)
BILIRUB SERPL-MCNC: 0.3 MG/DL (ref 0.1–1.5)
BUN SERPL-MCNC: 4 MG/DL (ref 8–22)
CALCIUM SERPL-MCNC: 9.3 MG/DL (ref 8.5–10.5)
CHLORIDE SERPL-SCNC: 99 MMOL/L (ref 96–112)
CO2 SERPL-SCNC: 26 MMOL/L (ref 20–33)
CREAT SERPL-MCNC: 0.59 MG/DL (ref 0.5–1.4)
EOSINOPHIL # BLD AUTO: 0.44 K/UL (ref 0–0.51)
EOSINOPHIL NFR BLD: 3.1 % (ref 0–6.9)
ERYTHROCYTE [DISTWIDTH] IN BLOOD BY AUTOMATED COUNT: 41.7 FL (ref 35.9–50)
GFR SERPL CREATININE-BSD FRML MDRD: >60 ML/MIN/1.73 M 2
GLOBULIN SER CALC-MCNC: 4.2 G/DL (ref 1.9–3.5)
GLUCOSE SERPL-MCNC: 85 MG/DL (ref 65–99)
GRAM STN SPEC: NORMAL
HCT VFR BLD AUTO: 29.9 % (ref 37–47)
HGB BLD-MCNC: 9.7 G/DL (ref 12–16)
IMM GRANULOCYTES # BLD AUTO: 0.26 K/UL (ref 0–0.11)
IMM GRANULOCYTES NFR BLD AUTO: 1.9 % (ref 0–0.9)
LYMPHOCYTES # BLD AUTO: 2.59 K/UL (ref 1–4.8)
LYMPHOCYTES NFR BLD: 18.5 % (ref 22–41)
MCH RBC QN AUTO: 29.4 PG (ref 27–33)
MCHC RBC AUTO-ENTMCNC: 32.4 G/DL (ref 33.6–35)
MCV RBC AUTO: 90.6 FL (ref 81.4–97.8)
MONOCYTES # BLD AUTO: 0.77 K/UL (ref 0–0.85)
MONOCYTES NFR BLD AUTO: 5.5 % (ref 0–13.4)
NEUTROPHILS # BLD AUTO: 9.84 K/UL (ref 2–7.15)
NEUTROPHILS NFR BLD: 70.4 % (ref 44–72)
NRBC # BLD AUTO: 0 K/UL
NRBC BLD AUTO-RTO: 0 /100 WBC
PLATELET # BLD AUTO: 706 K/UL (ref 164–446)
PMV BLD AUTO: 8.7 FL (ref 9–12.9)
POTASSIUM SERPL-SCNC: 4 MMOL/L (ref 3.6–5.5)
PROT SERPL-MCNC: 7.3 G/DL (ref 6–8.2)
RBC # BLD AUTO: 3.3 M/UL (ref 4.2–5.4)
SIGNIFICANT IND 70042: NORMAL
SITE SITE: NORMAL
SODIUM SERPL-SCNC: 136 MMOL/L (ref 135–145)
SOURCE SOURCE: NORMAL
WBC # BLD AUTO: 14 K/UL (ref 4.8–10.8)

## 2017-11-18 PROCEDURE — 87077 CULTURE AEROBIC IDENTIFY: CPT

## 2017-11-18 PROCEDURE — 36415 COLL VENOUS BLD VENIPUNCTURE: CPT

## 2017-11-18 PROCEDURE — 700102 HCHG RX REV CODE 250 W/ 637 OVERRIDE(OP): Performed by: HOSPITALIST

## 2017-11-18 PROCEDURE — 87186 SC STD MICRODIL/AGAR DIL: CPT

## 2017-11-18 PROCEDURE — 80053 COMPREHEN METABOLIC PANEL: CPT

## 2017-11-18 PROCEDURE — 700105 HCHG RX REV CODE 258: Performed by: INTERNAL MEDICINE

## 2017-11-18 PROCEDURE — A9270 NON-COVERED ITEM OR SERVICE: HCPCS | Performed by: HOSPITALIST

## 2017-11-18 PROCEDURE — 700101 HCHG RX REV CODE 250: Performed by: HOSPITALIST

## 2017-11-18 PROCEDURE — A9270 NON-COVERED ITEM OR SERVICE: HCPCS | Performed by: INTERNAL MEDICINE

## 2017-11-18 PROCEDURE — 94640 AIRWAY INHALATION TREATMENT: CPT

## 2017-11-18 PROCEDURE — 700102 HCHG RX REV CODE 250 W/ 637 OVERRIDE(OP): Performed by: INTERNAL MEDICINE

## 2017-11-18 PROCEDURE — 770027 HCHG ROOM/CARE - NEGATIVE PRESSURE ISOLATION

## 2017-11-18 PROCEDURE — 99232 SBSQ HOSP IP/OBS MODERATE 35: CPT | Performed by: HOSPITALIST

## 2017-11-18 PROCEDURE — 700111 HCHG RX REV CODE 636 W/ 250 OVERRIDE (IP): Performed by: INTERNAL MEDICINE

## 2017-11-18 PROCEDURE — 87205 SMEAR GRAM STAIN: CPT

## 2017-11-18 PROCEDURE — 700111 HCHG RX REV CODE 636 W/ 250 OVERRIDE (IP): Performed by: HOSPITALIST

## 2017-11-18 PROCEDURE — 94760 N-INVAS EAR/PLS OXIMETRY 1: CPT

## 2017-11-18 PROCEDURE — 85025 COMPLETE CBC W/AUTO DIFF WBC: CPT

## 2017-11-18 PROCEDURE — 87070 CULTURE OTHR SPECIMN AEROBIC: CPT

## 2017-11-18 PROCEDURE — 87040 BLOOD CULTURE FOR BACTERIA: CPT | Mod: 91

## 2017-11-18 RX ORDER — TEMAZEPAM 15 MG/1
15 CAPSULE ORAL
Status: DISCONTINUED | OUTPATIENT
Start: 2017-11-18 | End: 2017-11-24 | Stop reason: HOSPADM

## 2017-11-18 RX ORDER — BENZONATATE 100 MG/1
100 CAPSULE ORAL 3 TIMES DAILY PRN
Status: DISCONTINUED | OUTPATIENT
Start: 2017-11-18 | End: 2017-11-24 | Stop reason: HOSPADM

## 2017-11-18 RX ORDER — SODIUM CHLORIDE FOR INHALATION 7 %
4 VIAL, NEBULIZER (ML) INHALATION
Status: COMPLETED | OUTPATIENT
Start: 2017-11-18 | End: 2017-11-18

## 2017-11-18 RX ORDER — SODIUM CHLORIDE 9 MG/ML
INJECTION, SOLUTION INTRAVENOUS
Status: ACTIVE
Start: 2017-11-18 | End: 2017-11-19

## 2017-11-18 RX ADMIN — POLYETHYLENE GLYCOL 3350 1 PACKET: 17 POWDER, FOR SOLUTION ORAL at 10:21

## 2017-11-18 RX ADMIN — SUCRALFATE 1 G: 1 TABLET ORAL at 01:06

## 2017-11-18 RX ADMIN — FAMOTIDINE 20 MG: 20 TABLET, FILM COATED ORAL at 10:20

## 2017-11-18 RX ADMIN — SUCRALFATE 1 G: 1 TABLET ORAL at 16:02

## 2017-11-18 RX ADMIN — FAMOTIDINE 20 MG: 20 TABLET, FILM COATED ORAL at 21:20

## 2017-11-18 RX ADMIN — ENOXAPARIN SODIUM 30 MG: 100 INJECTION SUBCUTANEOUS at 10:21

## 2017-11-18 RX ADMIN — SUCRALFATE 1 G: 1 TABLET ORAL at 05:54

## 2017-11-18 RX ADMIN — POLYETHYLENE GLYCOL 3350 1 PACKET: 17 POWDER, FOR SOLUTION ORAL at 10:08

## 2017-11-18 RX ADMIN — ACETAMINOPHEN 650 MG: 325 TABLET, FILM COATED ORAL at 16:00

## 2017-11-18 RX ADMIN — AMPICILLIN SODIUM AND SULBACTAM SODIUM 3 G: 2; 1 INJECTION, POWDER, FOR SOLUTION INTRAMUSCULAR; INTRAVENOUS at 23:44

## 2017-11-18 RX ADMIN — SUCRALFATE 1 G: 1 TABLET ORAL at 21:20

## 2017-11-18 RX ADMIN — LORATADINE 10 MG: 10 TABLET ORAL at 10:20

## 2017-11-18 RX ADMIN — AMPICILLIN SODIUM AND SULBACTAM SODIUM 3 G: 2; 1 INJECTION, POWDER, FOR SOLUTION INTRAMUSCULAR; INTRAVENOUS at 18:35

## 2017-11-18 RX ADMIN — FLUTICASONE PROPIONATE 50 MCG: 50 SPRAY, METERED NASAL at 10:23

## 2017-11-18 RX ADMIN — SODIUM CHLORIDE 4 ML: 7 NEBU SOLN,3 % NEBU at 08:12

## 2017-11-18 RX ADMIN — STANDARDIZED SENNA CONCENTRATE AND DOCUSATE SODIUM 2 TABLET: 8.6; 5 TABLET, FILM COATED ORAL at 16:01

## 2017-11-18 ASSESSMENT — ENCOUNTER SYMPTOMS
VOMITING: 0
COUGH: 1
EYES NEGATIVE: 1
MUSCULOSKELETAL NEGATIVE: 1
FOCAL WEAKNESS: 0
WEAKNESS: 1
DEPRESSION: 0
HEADACHES: 0
CHILLS: 0
FEVER: 1
BRUISES/BLEEDS EASILY: 0
CARDIOVASCULAR NEGATIVE: 1
DIARRHEA: 0
HEARTBURN: 0
ABDOMINAL PAIN: 0
PALPITATIONS: 0
NAUSEA: 0
NECK PAIN: 0
SHORTNESS OF BREATH: 1
WEAKNESS: 0
WEIGHT LOSS: 1
NEUROLOGICAL NEGATIVE: 1
PSYCHIATRIC NEGATIVE: 1
DIZZINESS: 0
BACK PAIN: 0
SPUTUM PRODUCTION: 1
ABDOMINAL PAIN: 1
NAUSEA: 1
POLYDIPSIA: 0
SHORTNESS OF BREATH: 0
FEVER: 0

## 2017-11-18 ASSESSMENT — PAIN SCALES - GENERAL
PAINLEVEL_OUTOF10: 0
PAINLEVEL_OUTOF10: 0

## 2017-11-18 NOTE — PROGRESS NOTES
[facility] Hospitalist Progress Note    Date of Service: 2017    Chief Complaint  77 y.o. female admitted 2017 with O/p Dx'd possible pneumonia/ Allergic Rxn  Admitted with PNA and r/o TB    Interval Problem Update  Patient seen and examined today.    Patient tolerating treatment and therapies.  All Data, Medication data reviewed.  Case discussed with nursing as available.  Plan of Care reviewed with patient and notified of changes.   Pt feels better, less fever, stil sob and rales, c/o epigastric pain and constipation  11/15 Pt feels some better, with some temp and diaphoresis last night, abdomen better, CX's still pending   Pt with persistent abnormal lung exam , labs better, no F/C, ID eval requested  CT chest   Pt feels better, still dry cough, d/w ID and Pulm    Consultants/Specialty  ID  Pulm  Disposition  TBA        Review of Systems   Constitutional: Positive for fever.   HENT: Negative.    Eyes: Negative.    Respiratory: Positive for cough, sputum production and shortness of breath.    Cardiovascular: Negative.  Negative for palpitations.   Gastrointestinal: Positive for abdominal pain and nausea. Negative for heartburn and vomiting.   Genitourinary: Negative.  Negative for dysuria and frequency.   Musculoskeletal: Negative.  Negative for back pain and neck pain.   Skin: Negative.  Negative for itching and rash.   Neurological: Negative.  Negative for dizziness, focal weakness, weakness and headaches.   Endo/Heme/Allergies: Negative.  Negative for polydipsia. Does not bruise/bleed easily.   Psychiatric/Behavioral: Negative.  Negative for depression.   All other systems reviewed and are negative.     Physical Exam  Laboratory/Imaging   Hemodynamics  Temp (24hrs), Av.3 °C (99.2 °F), Min:37 °C (98.6 °F), Max:37.7 °C (99.9 °F)   Temperature: 37.1 °C (98.7 °F)  Pulse  Av.3  Min: 83  Max: 114   Blood Pressure : 128/70      Respiratory      Respiration: 17, Pulse Oximetry: 93 %      Work Of Breathing / Effort: Mild  RUL Breath Sounds: Diminished, RML Breath Sounds: Diminished, RLL Breath Sounds: Diminished, RENEE Breath Sounds: Diminished, LLL Breath Sounds: Diminished    Fluids    Intake/Output Summary (Last 24 hours) at 11/17/17 6504  Last data filed at 11/16/17 2000   Gross per 24 hour   Intake              120 ml   Output              250 ml   Net             -130 ml       Nutrition  Orders Placed This Encounter   Procedures   • Diet Order     Standing Status:   Standing     Number of Occurrences:   1     Order Specific Question:   Diet:     Answer:   Regular [1]     Physical Exam   Constitutional: She is oriented to person, place, and time. She appears well-developed and well-nourished.   HENT:   Head: Normocephalic and atraumatic.   Nose: Nose normal.   Mouth/Throat: Oropharynx is clear and moist.   Eyes: Conjunctivae and EOM are normal. Pupils are equal, round, and reactive to light.   Neck: Normal range of motion. Neck supple. No JVD present. No thyromegaly present.   Cardiovascular: Normal rate, regular rhythm and normal heart sounds.  Exam reveals no gallop and no friction rub.    Pulmonary/Chest: Effort normal. She has rales.   Abdominal: Soft. Bowel sounds are normal. She exhibits no distension and no mass. There is tenderness. There is no rebound and no guarding.   Musculoskeletal: Normal range of motion. She exhibits no edema or tenderness.   Lymphadenopathy:     She has no cervical adenopathy.   Neurological: She is alert and oriented to person, place, and time. No cranial nerve deficit.   Skin: Skin is warm and dry. She is not diaphoretic.   Psychiatric: She has a normal mood and affect. Her behavior is normal.   Nursing note and vitals reviewed.      Recent Labs      11/15/17   0353  11/16/17   0411  11/17/17   0441   WBC  15.3*  15.6*  13.2*   RBC  2.95*  3.01*  2.98*   HEMOGLOBIN  8.9*  9.0*  9.0*   HEMATOCRIT  27.6*  27.7*  27.1*   MCV  93.6  92.0  90.9   MCH  30.2  29.9   30.2   MCHC  32.2*  32.5*  33.2*   RDW  44.5  42.2  41.7   PLATELETCT  506*  548*  582*   MPV  8.5*  8.7*  8.6*     Recent Labs      11/15/17   0353  11/16/17   0411   SODIUM  137  135   POTASSIUM  4.3  4.1   CHLORIDE  107  103   CO2  21  26   GLUCOSE  92  92   BUN  6*  5*   CREATININE  0.62  0.59   CALCIUM  8.3*  8.8         Recent Labs      11/15/17   0353   BNPBTYPENAT  286*              Assessment/Plan     Community acquired pneumonia- (present on admission)   Assessment & Plan    Unclear pathogen  Travel through [facility]  eval for TB and atypicals        Anemia- (present on admission)   Assessment & Plan    Iron deficiency  From ? Uterine prolapse        Sepsis (CMS-HCC)- (present on admission)   Assessment & Plan    Mild/simple, c/w measures            Reviewed items::  Medications reviewed, Labs reviewed and Radiology images reviewed  Wilson catheter::  No Wilson  DVT prophylaxis pharmacological::  Enoxaparin (Lovenox)  DVT prophylaxis - mechanical:  SCDs  Antibiotics:  Treating active infection/contamination beyond 24 hours perioperative coverage      Plan  C/w emp. Tx  eval for anemia resulted in Iron needs, replaced  R/o TB f/u in progress and d/w ID   Pulm eval and Bronch  C/w isolation  Still enough concern for TB  See orders

## 2017-11-18 NOTE — CARE PLAN
Problem: Infection  Goal: Will remain free from infection  Outcome: PROGRESSING AS EXPECTED  Isolation precautions in place, antibiotics in use, ID following

## 2017-11-18 NOTE — RESPIRATORY CARE
Sputum induction performed nebulized 7%, Pt could not produce any sputum has very dry cough. Sputum cup left at bedside for patient will call when she can produce sputum.

## 2017-11-18 NOTE — PROGRESS NOTES
Vicky Chen Fall Risk Assessment:     Last Known Fall: Within the last six months  Mobility: No limitations  Medications: Cardiovascular or central nervous system meds  Mental Status/LOC/Awareness: Awake, alert, and oriented to date, place, and person  Toileting Needs: No needs  Volume/Electrolyte Status: Use of IV fluids/tube feeds  Communication/Sensory: Visual (Glasses)/hearing deficit  Behavior: Appropriate behavior  Vicky Chen Fall Risk Total: 9  Fall Risk Level: LOW RISK    Universal Fall Precautions:  call light/belongings in reach, bed in low position and locked, siderails up x 2, use non-slip footwear, adequate lighting, clutter free and spill free environment, educate on level of risk, educate to call for assistance    Fall Risk Level Interventions:   TRIAL ([employer], NEURO, MED EMIL 5) Low Fall Risk Interventions  Place yellow fall risk ID band on patient: verified  Provide patient/family education based on risk assessment: completed  Educate patient/family to call staff for assistance when getting out of bed: completed  Place fall precaution signage outside patient door: verifiedTRIAL ([employer], NEURO, MED EMIL 5) Moderate Fall Risk Interventions  Place yellow fall risk ID band on patient: verified  Provide patient/family education based on risk assessment : verified  Educate patient/family to call staff for assistance when getting out of bed: verified  Place fall precaution signage outside patient door: verified  Utilize bed/chair fall alarm: verified     Patient Specific Interventions:     Medication: review medications with patient and family and limit combination of prn medications  Mental Status/LOC/Awareness: check on patient hourly  Toileting: provide frquent toileting  Volume/Electrolyte Status: ensure patient remains hydrated, monitor abnormal lab values and ensure IV fluids are appropriate  Communication/Sensory: update plan of care on whiteboard  Behavioral: not applicable  Mobility: ensure  bed is locked and in lowest position

## 2017-11-18 NOTE — PROGRESS NOTES
Bedside report received from Patricia DRAKE. Patient's chart and MAR reviewed. 12 hour chart check complete. Pt is awake in bed. Pt is AOx4. Patient was updated on plan of care for the day. Questions answered and concerns addressed.  Pt denies any additional needs at this time. White board updated. Call light and personal belongings within reach, bed in lowest position.

## 2017-11-18 NOTE — PROGRESS NOTES
Chart review and pt examined  Will discuss pt with her family.  Full consult to follow  If pt agrees plan bronchoscopy Sunday.

## 2017-11-18 NOTE — CONSULTS
Critical Care/Pulmonary Consultation    Date of service: 11/18/2017    Consulting Physician: Alex Hager M.D.  Reason for consult - consideration for bronchoscopy    History of Present Illness: Irina Conrad is a 77 y.o. female here for evaluation of her cough and sob.  According to the daughter the pt has just moved here from the [facility] October 2017.  She was most recently living in [place].  She lived in the  [place] until May 2017 and returned due to health problems in part inability to tolerate air quality in the [facility].  Lived in [place], then [place] prior to [place].  She was treated recently for pneumonia.  Multiple sputums neg for AFB  Has had problems with allergies.   Daughter says the pt had chills, fever, productive cough and weight loss prior to admission.  She thinks these symptoms have improved.  Daughter is PPD +.    No current facility-administered medications on file prior to encounter.      Current Outpatient Prescriptions on File Prior to Encounter   Medication Sig Dispense Refill   • doxycycline (VIBRAMYCIN) 100 MG Tab Take 1 Tab by mouth 2 times a day for 10 days. 20 Tab 0   • fluticasone (FLONASE) 50 MCG/ACT nasal spray Spray 1 Spray in nose every day. 16 g 2   • loratadine (CLARITIN) 10 MG Tab Take 1 Tab by mouth every day. 30 Tab 0       Social History   Substance Use Topics   • Smoking status: Former Smoker   • Smokeless tobacco: Never Used   • Alcohol use Yes      Comment: in the    smoker 3 cigarettes per day until 1986 when she quit    Past Medical History:   Diagnosis Date   • Chronic cough     worse since Sept 2017   • Falls    • Knee pain, right        No prior surgery  Allergies: Patient has no known allergies.    Family History   Problem Relation Age of Onset   • Asthma Father      Physical Examination  Vitals:    11/17/17 2359 11/18/17 0503 11/18/17 0753 11/18/17 1228   BP: 135/79 123/73 130/72 151/81   Weight:       Height:           General: alert and able to  speak in full sentences  Neuro/Psych: appropriate  HEENT: neg  CVS: regular  Respiratory: rales right upper  Abdomen/: nontender  Extremities: no CCE  Skin: clear    Recent Labs      11/16/17 0411 11/17/17 0441 11/18/17 0253   WBC  15.6*  13.2*  14.0*   NEUTSPOLYS   --   67.30  70.40   LYMPHOCYTES   --   20.50*  18.50*   MONOCYTES   --   7.10  5.50   EOSINOPHILS   --   2.60  3.10   BASOPHILS   --   0.30  0.60   ASTSGOT   --    --   16   ALTSGPT   --    --   15   ALKPHOSPHAT   --    --   89   TBILIRUBIN   --    --   0.3     Recent Labs      11/16/17 0411 11/18/17 0253   SODIUM  135  136   POTASSIUM  4.1  4.0   CHLORIDE  103  99   CO2  26  26   BUN  5*  4*   CREATININE  0.59  0.59   MAGNESIUM  1.5   --    PHOSPHORUS  2.8   --    CALCIUM  8.8  9.3     Recent Labs      11/16/17 0411 11/18/17 0253   ALTSGPT   --   15   ASTSGOT   --   16   ALKPHOSPHAT   --   89   TBILIRUBIN   --   0.3   GLUCOSE  92  85           Invalid input(s): [id number]  CT-CHEST (THORAX) WITH   Final Result      1.  Consolidation is identified which is prominent in the right lung apex. Findings could be due to pneumonia. Tuberculosis is a possibility.      2.  Surrounding groundglass opacification is also noted in the right upper lobe.      3.  Additional scattered multifocal opacifications are noted in each lung. Bronchiectasis is also present. Findings are likely due to infectious or inflammatory process.      4.  Mildly enlarged lymph nodes are noted in the upper mediastinum and pretracheal area.            DX-CHEST-PORTABLE (1 VIEW)   Final Result         1.  Patchy bilateral pulmonary opacities, greatest in the right apex, stable since prior, most compatible with infiltrates. Radiographic follow-up to resolution recommended. As previously recommended exclusion of tuberculosis as clinically warranted.   2.  Atherosclerosis          Assessment and Plan:    Presumed prior tb exposure  Abn cxr and CT - treated for pneumonia  previously  Neg afb    Bronchoscopy explained to pt and her daughter who reiterated procedure details in Tagalog    Time 50 minutes

## 2017-11-19 ENCOUNTER — APPOINTMENT (OUTPATIENT)
Dept: RADIOLOGY | Facility: MEDICAL CENTER | Age: 77
DRG: 853 | End: 2017-11-19
Attending: INTERNAL MEDICINE
Payer: MEDICARE

## 2017-11-19 LAB
ANION GAP SERPL CALC-SCNC: 10 MMOL/L (ref 0–11.9)
BASOPHILS # BLD AUTO: 0.4 % (ref 0–1.8)
BASOPHILS # BLD: 0.05 K/UL (ref 0–0.12)
BUN SERPL-MCNC: 6 MG/DL (ref 8–22)
CALCIUM SERPL-MCNC: 8.8 MG/DL (ref 8.5–10.5)
CHLORIDE SERPL-SCNC: 100 MMOL/L (ref 96–112)
CO2 SERPL-SCNC: 26 MMOL/L (ref 20–33)
CREAT SERPL-MCNC: 0.62 MG/DL (ref 0.5–1.4)
EOSINOPHIL # BLD AUTO: 0.54 K/UL (ref 0–0.51)
EOSINOPHIL NFR BLD: 4.8 % (ref 0–6.9)
ERYTHROCYTE [DISTWIDTH] IN BLOOD BY AUTOMATED COUNT: 41.3 FL (ref 35.9–50)
ERYTHROCYTE [SEDIMENTATION RATE] IN BLOOD BY WESTERGREN METHOD: 115 MM/HOUR (ref 0–30)
GFR SERPL CREATININE-BSD FRML MDRD: >60 ML/MIN/1.73 M 2
GLUCOSE SERPL-MCNC: 95 MG/DL (ref 65–99)
GRAM STN SPEC: NORMAL
HCT VFR BLD AUTO: 28.9 % (ref 37–47)
HGB BLD-MCNC: 9.8 G/DL (ref 12–16)
IMM GRANULOCYTES # BLD AUTO: 0.2 K/UL (ref 0–0.11)
IMM GRANULOCYTES NFR BLD AUTO: 1.8 % (ref 0–0.9)
LYMPHOCYTES # BLD AUTO: 1.81 K/UL (ref 1–4.8)
LYMPHOCYTES NFR BLD: 16 % (ref 22–41)
MCH RBC QN AUTO: 30.3 PG (ref 27–33)
MCHC RBC AUTO-ENTMCNC: 33.9 G/DL (ref 33.6–35)
MCV RBC AUTO: 89.5 FL (ref 81.4–97.8)
MONOCYTES # BLD AUTO: 0.78 K/UL (ref 0–0.85)
MONOCYTES NFR BLD AUTO: 6.9 % (ref 0–13.4)
NEUTROPHILS # BLD AUTO: 7.91 K/UL (ref 2–7.15)
NEUTROPHILS NFR BLD: 70.1 % (ref 44–72)
NRBC # BLD AUTO: 0 K/UL
NRBC BLD AUTO-RTO: 0 /100 WBC
PLATELET # BLD AUTO: 594 K/UL (ref 164–446)
PMV BLD AUTO: 8.1 FL (ref 9–12.9)
POTASSIUM SERPL-SCNC: 3.8 MMOL/L (ref 3.6–5.5)
RBC # BLD AUTO: 3.23 M/UL (ref 4.2–5.4)
RHODAMINE-AURAMINE STN SPEC: NORMAL
SIGNIFICANT IND 70042: NORMAL
SIGNIFICANT IND 70042: NORMAL
SITE SITE: NORMAL
SITE SITE: NORMAL
SODIUM SERPL-SCNC: 136 MMOL/L (ref 135–145)
SOURCE SOURCE: NORMAL
SOURCE SOURCE: NORMAL
WBC # BLD AUTO: 11.3 K/UL (ref 4.8–10.8)

## 2017-11-19 PROCEDURE — 99153 MOD SED SAME PHYS/QHP EA: CPT | Performed by: INTERNAL MEDICINE

## 2017-11-19 PROCEDURE — A9270 NON-COVERED ITEM OR SERVICE: HCPCS | Performed by: HOSPITALIST

## 2017-11-19 PROCEDURE — 87206 SMEAR FLUORESCENT/ACID STAI: CPT

## 2017-11-19 PROCEDURE — 85025 COMPLETE CBC W/AUTO DIFF WBC: CPT

## 2017-11-19 PROCEDURE — 700102 HCHG RX REV CODE 250 W/ 637 OVERRIDE(OP): Performed by: HOSPITALIST

## 2017-11-19 PROCEDURE — 71010 DX-CHEST-LIMITED (1 VIEW): CPT

## 2017-11-19 PROCEDURE — 36415 COLL VENOUS BLD VENIPUNCTURE: CPT

## 2017-11-19 PROCEDURE — 700111 HCHG RX REV CODE 636 W/ 250 OVERRIDE (IP): Performed by: INTERNAL MEDICINE

## 2017-11-19 PROCEDURE — 0BDC8ZX EXTRACTION OF RIGHT UPPER LUNG LOBE, VIA NATURAL OR ARTIFICIAL OPENING ENDOSCOPIC, DIAGNOSTIC: ICD-10-PCS | Performed by: INTERNAL MEDICINE

## 2017-11-19 PROCEDURE — 99152 MOD SED SAME PHYS/QHP 5/>YRS: CPT | Performed by: INTERNAL MEDICINE

## 2017-11-19 PROCEDURE — 700102 HCHG RX REV CODE 250 W/ 637 OVERRIDE(OP): Performed by: INTERNAL MEDICINE

## 2017-11-19 PROCEDURE — 87070 CULTURE OTHR SPECIMN AEROBIC: CPT

## 2017-11-19 PROCEDURE — A9270 NON-COVERED ITEM OR SERVICE: HCPCS | Performed by: INTERNAL MEDICINE

## 2017-11-19 PROCEDURE — 0B9C8ZX DRAINAGE OF RIGHT UPPER LUNG LOBE, VIA NATURAL OR ARTIFICIAL OPENING ENDOSCOPIC, DIAGNOSTIC: ICD-10-PCS | Performed by: INTERNAL MEDICINE

## 2017-11-19 PROCEDURE — 85652 RBC SED RATE AUTOMATED: CPT

## 2017-11-19 PROCEDURE — 700105 HCHG RX REV CODE 258: Performed by: INTERNAL MEDICINE

## 2017-11-19 PROCEDURE — 88104 CYTOPATH FL NONGYN SMEARS: CPT

## 2017-11-19 PROCEDURE — 700111 HCHG RX REV CODE 636 W/ 250 OVERRIDE (IP): Performed by: HOSPITALIST

## 2017-11-19 PROCEDURE — 87077 CULTURE AEROBIC IDENTIFY: CPT

## 2017-11-19 PROCEDURE — 770027 HCHG ROOM/CARE - NEGATIVE PRESSURE ISOLATION

## 2017-11-19 PROCEDURE — 87015 SPECIMEN INFECT AGNT CONCNTJ: CPT

## 2017-11-19 PROCEDURE — 88112 CYTOPATH CELL ENHANCE TECH: CPT

## 2017-11-19 PROCEDURE — 88305 TISSUE EXAM BY PATHOLOGIST: CPT

## 2017-11-19 PROCEDURE — 80048 BASIC METABOLIC PNL TOTAL CA: CPT

## 2017-11-19 PROCEDURE — 700111 HCHG RX REV CODE 636 W/ 250 OVERRIDE (IP)

## 2017-11-19 PROCEDURE — 160048 HCHG OR STATISTICAL LEVEL 1-5: Performed by: INTERNAL MEDICINE

## 2017-11-19 PROCEDURE — 302978 HCHG BRONCHOSCOPY-DIAGNOSTIC

## 2017-11-19 PROCEDURE — 87116 MYCOBACTERIA CULTURE: CPT

## 2017-11-19 PROCEDURE — 87205 SMEAR GRAM STAIN: CPT

## 2017-11-19 PROCEDURE — 99232 SBSQ HOSP IP/OBS MODERATE 35: CPT | Performed by: HOSPITALIST

## 2017-11-19 RX ORDER — MIDAZOLAM HYDROCHLORIDE 1 MG/ML
INJECTION INTRAMUSCULAR; INTRAVENOUS
Status: DISCONTINUED | OUTPATIENT
Start: 2017-11-19 | End: 2017-11-19 | Stop reason: HOSPADM

## 2017-11-19 RX ORDER — MIDAZOLAM HYDROCHLORIDE 1 MG/ML
.5-2 INJECTION INTRAMUSCULAR; INTRAVENOUS PRN
Status: ACTIVE | OUTPATIENT
Start: 2017-11-19 | End: 2017-11-19

## 2017-11-19 RX ORDER — SODIUM CHLORIDE 9 MG/ML
500 INJECTION, SOLUTION INTRAVENOUS
Status: ACTIVE | OUTPATIENT
Start: 2017-11-19 | End: 2017-11-19

## 2017-11-19 RX ADMIN — AMPICILLIN SODIUM AND SULBACTAM SODIUM 3 G: 2; 1 INJECTION, POWDER, FOR SOLUTION INTRAMUSCULAR; INTRAVENOUS at 15:56

## 2017-11-19 RX ADMIN — ENOXAPARIN SODIUM 30 MG: 100 INJECTION SUBCUTANEOUS at 15:54

## 2017-11-19 RX ADMIN — FAMOTIDINE 20 MG: 20 TABLET, FILM COATED ORAL at 15:55

## 2017-11-19 RX ADMIN — AMPICILLIN SODIUM AND SULBACTAM SODIUM 3 G: 2; 1 INJECTION, POWDER, FOR SOLUTION INTRAMUSCULAR; INTRAVENOUS at 05:33

## 2017-11-19 RX ADMIN — SUCRALFATE 1 G: 1 TABLET ORAL at 21:31

## 2017-11-19 RX ADMIN — SUCRALFATE 1 G: 1 TABLET ORAL at 15:00

## 2017-11-19 RX ADMIN — FLUTICASONE PROPIONATE 50 MCG: 50 SPRAY, METERED NASAL at 15:56

## 2017-11-19 RX ADMIN — BENZONATATE 100 MG: 100 CAPSULE ORAL at 06:12

## 2017-11-19 RX ADMIN — BENZONATATE 100 MG: 100 CAPSULE ORAL at 21:31

## 2017-11-19 RX ADMIN — LORATADINE 10 MG: 10 TABLET ORAL at 15:55

## 2017-11-19 RX ADMIN — SUCRALFATE 1 G: 1 TABLET ORAL at 04:02

## 2017-11-19 ASSESSMENT — ENCOUNTER SYMPTOMS
LOSS OF CONSCIOUSNESS: 0
HEADACHES: 0
SHORTNESS OF BREATH: 0
NAUSEA: 0
DIZZINESS: 0
WEAKNESS: 1
VOMITING: 0
SHORTNESS OF BREATH: 1
DIARRHEA: 0
COUGH: 1
CHILLS: 0
FEVER: 0
ABDOMINAL PAIN: 0
BACK PAIN: 0
SPUTUM PRODUCTION: 1
WEIGHT LOSS: 1

## 2017-11-19 ASSESSMENT — PAIN SCALES - GENERAL
PAINLEVEL_OUTOF10: 0

## 2017-11-19 NOTE — FLOWSHEET NOTE
"   11/19/17 0832   Events/Summary/Plan   Events/Summary/Plan Bronchoscopy performed in bronch suite   General Vent Information   Pulse Oximetry 99 %   Heart Rate (Monitored) 91   Bronchoscopy Procedure   Bronchoscopy Procedure Yes   Pre-Op Teaching Yes   Consent Signed Yes   Time Out Performed Yes   Medication Allergy Reviewed Yes   Procedure Performed in Bronch Suite? Yes   If yes, Negative Pressure Reading (\"[place]\" present during procedure )   #Diagnostic Procedure  Yes   Start Time 0814   End Time 0832   Performing Physician Dr. Goldberg   Indication(s) for Procedure Identified by Physician Refractory Atelectasis / Pneumonitis   Specimen(s) Specified by Physician Slides for Cytology   Brushing(s) Cytology   Washing(s) BAL   Specimen(s) Identified / Labeled for Location RUL   Scope Serial Number [id number]   Post Procedure Response Pt alison procedure well     ETCO2 in placed during procedure. Pre-procedure reading : 33 mmHg   During procedure:  39 mmHg - 41 mmHg  Pt stable on 2 LPM NC post-procedure  "

## 2017-11-19 NOTE — PROGRESS NOTES
Vicky Chen Fall Risk Assessment:     Last Known Fall: Within the last six months  Mobility: No limitations  Medications: Cardiovascular or central nervous system meds  Mental Status/LOC/Awareness: Awake, alert, and oriented to date, place, and person  Toileting Needs: No needs  Volume/Electrolyte Status: No problems  Communication/Sensory: Visual (Glasses)/hearing deficit  Behavior: Appropriate behavior  Vicky Chen Fall Risk Total: 7  Fall Risk Level: LOW RISK    Universal Fall Precautions:  call light/belongings in reach, bed in low position and locked, wheelchairs and assistive devices out of sight, siderails up x 2, use non-slip footwear, adequate lighting, educate to call for assistance, clutter free and spill free environment, educate on level of risk    Fall Risk Level Interventions:   TRIAL ([employer], NEURO, MED EMIL 5) Low Fall Risk Interventions  Place yellow fall risk ID band on patient: verified  Provide patient/family education based on risk assessment: completed  Educate patient/family to call staff for assistance when getting out of bed: completed  Place fall precaution signage outside patient door: verifiedTRIAL ([employer], NEURO, MED EMIL 5) Moderate Fall Risk Interventions  Place yellow fall risk ID band on patient: verified  Provide patient/family education based on risk assessment : verified  Educate patient/family to call staff for assistance when getting out of bed: verified  Place fall precaution signage outside patient door: verified  Utilize bed/chair fall alarm: verified     Patient Specific Interventions:     Medication: review medications with patient and family  Mental Status/LOC/Awareness: reinforce the use of call light  Toileting: provide frquent toileting  Volume/Electrolyte Status: ensure patient remains hydrated  Communication/Sensory: update plan of care on whiteboard  Behavioral: engage patient in daily activities  Mobility: ensure bed is locked and in lowest position and  provide appropriate assistive device

## 2017-11-19 NOTE — PROGRESS NOTES
[facility] Hospitalist Progress Note    Date of Service: 2017    Chief Complaint  77 y.o. female admitted 2017 with O/p Dx'd possible pneumonia/ Allergic Rxn  Admitted with PNA and r/o TB    Interval Problem Update  Patient seen and examined today.    Patient tolerating treatment and therapies.  All Data, Medication data reviewed.  Case discussed with nursing as available.  Plan of Care reviewed with patient and notified of changes.   Pt feels better, less fever, stil sob and rales, c/o epigastric pain and constipation  11/15 Pt feels some better, with some temp and diaphoresis last night, abdomen better, CX's still pending   Pt with persistent abnormal lung exam , labs better, no F/C, ID eval requested  CT chest   Pt feels better, still dry cough, d/w ID and Pulm   Pt feels ok, nocturnal dry cough, for bronch in am, d/w ID and Pulm    Consultants/Specialty  ID  Pulm  Disposition  TBA        Review of Systems   Constitutional: Positive for fever.   HENT: Negative.    Eyes: Negative.    Respiratory: Positive for cough, sputum production and shortness of breath.    Cardiovascular: Negative.  Negative for palpitations.   Gastrointestinal: Positive for abdominal pain and nausea. Negative for heartburn and vomiting.   Genitourinary: Negative.  Negative for dysuria and frequency.   Musculoskeletal: Negative.  Negative for back pain and neck pain.   Skin: Negative.  Negative for itching and rash.   Neurological: Negative.  Negative for dizziness, focal weakness, weakness and headaches.   Endo/Heme/Allergies: Negative.  Negative for polydipsia. Does not bruise/bleed easily.   Psychiatric/Behavioral: Negative.  Negative for depression.   All other systems reviewed and are negative.     Physical Exam  Laboratory/Imaging   Hemodynamics  Temp (24hrs), Av.6 °C (99.7 °F), Min:37 °C (98.6 °F), Max:38.6 °C (101.5 °F)   Temperature: (!) 38.6 °C (101.5 °F)  Pulse  Av.5  Min: 79  Max: 114   Blood  Pressure : 128/77      Respiratory      Respiration: 20, Pulse Oximetry: 95 %, O2 Daily Delivery Respiratory : Room Air with O2 Available     Given By:: Mouthpiece, Work Of Breathing / Effort: Mild  RUL Breath Sounds: Diminished, RML Breath Sounds: Diminished, RLL Breath Sounds: Diminished, RENEE Breath Sounds: Diminished, LLL Breath Sounds: Diminished    Fluids  No intake or output data in the 24 hours ending 11/18/17 1650    Nutrition  Orders Placed This Encounter   Procedures   • Diet Order     Standing Status:   Standing     Number of Occurrences:   1     Order Specific Question:   Diet:     Answer:   Regular [1]   • DIET NPO     Standing Status:   Standing     Number of Occurrences:   8     Order Specific Question:   Restrict to:     Answer:   Sips with Medications [3]     Physical Exam   Constitutional: She is oriented to person, place, and time. She appears well-developed and well-nourished.   HENT:   Head: Normocephalic and atraumatic.   Nose: Nose normal.   Mouth/Throat: Oropharynx is clear and moist.   Eyes: Conjunctivae and EOM are normal. Pupils are equal, round, and reactive to light.   Neck: Normal range of motion. Neck supple. No JVD present. No thyromegaly present.   Cardiovascular: Normal rate, regular rhythm and normal heart sounds.  Exam reveals no gallop and no friction rub.    Pulmonary/Chest: Effort normal. She has rales.   Abdominal: Soft. Bowel sounds are normal. She exhibits no distension and no mass. There is tenderness. There is no rebound and no guarding.   Musculoskeletal: Normal range of motion. She exhibits no edema or tenderness.   Lymphadenopathy:     She has no cervical adenopathy.   Neurological: She is alert and oriented to person, place, and time. No cranial nerve deficit.   Skin: Skin is warm and dry. She is not diaphoretic.   Psychiatric: She has a normal mood and affect. Her behavior is normal.   Nursing note and vitals reviewed.      Recent Labs      11/16/17   0411  11/17/17    0441  11/18/17   0253   WBC  15.6*  13.2*  14.0*   RBC  3.01*  2.98*  3.30*   HEMOGLOBIN  9.0*  9.0*  9.7*   HEMATOCRIT  27.7*  27.1*  29.9*   MCV  92.0  90.9  90.6   MCH  29.9  30.2  29.4   MCHC  32.5*  33.2*  32.4*   RDW  42.2  41.7  41.7   PLATELETCT  548*  582*  706*   MPV  8.7*  8.6*  8.7*     Recent Labs      11/16/17   0411  11/18/17   0253   SODIUM  135  136   POTASSIUM  4.1  4.0   CHLORIDE  103  99   CO2  26  26   GLUCOSE  92  85   BUN  5*  4*   CREATININE  0.59  0.59   CALCIUM  8.8  9.3                      Assessment/Plan     Community acquired pneumonia- (present on admission)   Assessment & Plan    Unclear pathogen  Travel through [facility]  eval for TB and atypicals        Anemia- (present on admission)   Assessment & Plan    Iron deficiency  From ? Uterine prolapse        Sepsis (CMS-HCC)- (present on admission)   Assessment & Plan    Mild/simple, c/w measures            Reviewed items::  Medications reviewed, Labs reviewed and Radiology images reviewed  Wilson catheter::  No Wilson  DVT prophylaxis pharmacological::  Enoxaparin (Lovenox)  DVT prophylaxis - mechanical:  SCDs  Antibiotics:  Treating active infection/contamination beyond 24 hours perioperative coverage      Plan  C/w emp. Tx  eval for anemia resulted in Iron needs, replaced  R/o TB f/u in progress and d/w ID, High clinical susp. Despite negative cx's   Pulm eval and Bronch in am  C/w isolation    See orders

## 2017-11-19 NOTE — PROGRESS NOTES
Infectious Disease Progress Note    Author: Leida Frye M.D. Date & Time of service: 2017  5:29 PM    Chief Complaint:  FU PNA    Interval History:   77 y.o. [place] admitted 2017 for pneumonia   AF (99.9) WBC 13.2 minimal cough-denies SE abx   Increased temp 101.5, WBC 14 plan for bronch in am  Labs Reviewed, Medications Reviewed and Radiology Reviewed.    Review of Systems:  Review of Systems   Constitutional: Positive for weight loss. Negative for chills and fever.   Respiratory: Positive for cough. Negative for shortness of breath.    Cardiovascular: Positive for chest pain.        With cough   Gastrointestinal: Negative for abdominal pain, diarrhea, nausea and vomiting.   Neurological: Positive for weakness.       Hemodynamics:  Temp (24hrs), Av.6 °C (99.7 °F), Min:37 °C (98.6 °F), Max:38.6 °C (101.5 °F)  Temperature: (!) 38.6 °C (101.5 °F)  Pulse  Av.5  Min: 79  Max: 114  Blood Pressure : 128/77       Physical Exam:  Physical Exam   Constitutional: She is oriented to person, place, and time. She appears well-developed. No distress.   HENT:   Head: Normocephalic and atraumatic.   Eyes: EOM are normal. Pupils are equal, round, and reactive to light.   Cardiovascular: Normal rate and regular rhythm.    Pulmonary/Chest: Effort normal. No respiratory distress. She has no wheezes. She has rales.   Abdominal: Soft. She exhibits no distension. There is no tenderness.   Musculoskeletal: She exhibits edema.   Neurological: She is alert and oriented to person, place, and time.   Skin: No rash noted.   Nursing note and vitals reviewed.      Meds:    Current Facility-Administered Medications:   •  benzonatate  •  temazepam  •  polyethylene glycol/lytes  •  famotidine  •  hyoscyamine-maalox plus-lidocaine viscous  •  enoxaparin (LOVENOX) injection  •  cefTRIAXone (ROCEPHIN) IV  •  sucralfate  •  fluticasone  •  loratadine  •  senna-docusate **AND** polyethylene glycol/lytes **AND**  magnesium hydroxide **AND** bisacodyl  •  Respiratory Care per Protocol  •  NS  •  acetaminophen  •  labetalol  •  ondansetron  •  ondansetron    Labs:  Recent Labs      11/16/17 0411 11/17/17 0441 11/18/17   0253   WBC  15.6*  13.2*  14.0*   RBC  3.01*  2.98*  3.30*   HEMOGLOBIN  9.0*  9.0*  9.7*   HEMATOCRIT  27.7*  27.1*  29.9*   MCV  92.0  90.9  90.6   MCH  29.9  30.2  29.4   RDW  42.2  41.7  41.7   PLATELETCT  548*  582*  706*   MPV  8.7*  8.6*  8.7*   NEUTSPOLYS   --   67.30  70.40   LYMPHOCYTES   --   20.50*  18.50*   MONOCYTES   --   7.10  5.50   EOSINOPHILS   --   2.60  3.10   BASOPHILS   --   0.30  0.60     Recent Labs      11/16/17 0411 11/18/17   0253   SODIUM  135  136   POTASSIUM  4.1  4.0   CHLORIDE  103  99   CO2  26  26   GLUCOSE  92  85   BUN  5*  4*     Recent Labs      11/16/17 0411 11/18/17   0253   ALBUMIN   --   3.1*   TBILIRUBIN   --   0.3   ALKPHOSPHAT   --   89   TOTPROTEIN   --   7.3   ALTSGPT   --   15   ASTSGOT   --   16   CREATININE  0.59  0.59       Imaging:  Ct-chest (thorax) With    Result Date: 11/16/2017 11/16/2017 6:55 PM HISTORY/REASON FOR EXAM:  Shortness of breath fever cough with history of pneumonia TECHNIQUE/EXAM DESCRIPTION: CT scan of the chest with contrast. Thin-section helical images were obtained from the lung apices through the adrenal glands following the bolus administration of 80ml mL of Optiray 350 nonionic contrast. Low dose optimization technique was utilized for this CT exam including automated exposure control and adjustment of the mA and/or kV according to patient size. COMPARISON:  None FINDINGS: Extensive consolidation is noted in the right upper lobe extending to the right lung apex. Surrounding areas of groundglass opacification are noted in the right upper lobe. Additional scattered multifocal irregular shaped and nodular opacifications with ill-defined borders are noted in both lungs. Bronchiectasis is also present bilaterally. No pleural  effusions are identified. There is no pneumothorax. Mild upper mediastinal and pretracheal adenopathy is noted. There is no evidence of pericardial effusion. No large masses are seen within the upper abdomen.     1.  Consolidation is identified which is prominent in the right lung apex. Findings could be due to pneumonia. Tuberculosis is a possibility. 2.  Surrounding groundglass opacification is also noted in the right upper lobe. 3.  Additional scattered multifocal opacifications are noted in each lung. Bronchiectasis is also present. Findings are likely due to infectious or inflammatory process. 4.  Mildly enlarged lymph nodes are noted in the upper mediastinum and pretracheal area.     Dx-chest-2 Views    Result Date: 11/11/2017 11/11/2017 10:22 AM HISTORY/REASON FOR EXAM:  Shortness of Breath Cough, intermittent fever TECHNIQUE/EXAM DESCRIPTION AND NUMBER OF VIEWS: Two views of the chest. COMPARISON:  None available. FINDINGS: Cardiac contour is mildly prominent. Patchy parenchymal and interstitial opacities in both lungs. Focal opacity at the RIGHT lung apex. No pleural fluid collection or pneumothorax. Degenerative change of thoracic spine.     1.  Patchy interstitial and alveolar opacities in both lungs, likely inflammatory. 2.  Focal opacity RIGHT lung apex may represent mass or focal inflammatory process. 3.  Tuberculosis is not excluded.    Dx-chest-portable (1 View)    Result Date: 11/13/2017 11/13/2017 8:14 PM HISTORY/REASON FOR EXAM:  Possible sepsis TECHNIQUE/EXAM DESCRIPTION:  Single AP view of the chest. COMPARISON: Today at 1012 FINDINGS: The cardiac silhouette appears within normal limits. Atherosclerotic calcification of the aorta is noted.  The central pulmonary vasculature appears normal. The lungs appear well expanded bilaterally.  Scattered patchy bilateral pulmonary opacities are seen, greatest in the right apex. No significant pleural effusions are identified. The bony structures appear  age-appropriate.     1.  Patchy bilateral pulmonary opacities, greatest in the right apex, stable since prior, most compatible with infiltrates. Radiographic follow-up to resolution recommended. As previously recommended exclusion of tuberculosis as clinically warranted. 2.  Atherosclerosis      Micro:  Results     Procedure Component Value Units Date/Time    CULTURE RESPIRATORY W/ GRM STN [[id number]] Collected:  11/18/17 0949    Order Status:  Completed Specimen:  Respirate from Sputum Induced Updated:  11/18/17 0954    Narrative:       Airborne,[id number] ALMA ROSA ADHIKARI  For AFB , indiced    CULTURE RESPIRATORY W/ GRM STN [[id number]] Collected:  11/18/17 0949    Order Status:  Sent Specimen:  Respirate from Sputum Induced     CULTURE RESPIRATORY W/ GRM STN [[id number]] Collected:  11/18/17 0949    Order Status:  Sent Specimen:  Respirate from Sputum Induced     AFB CULTURE [[id number]] Collected:  11/16/17 0330    Order Status:  Completed Specimen:  Respirate from Sputum Induced Updated:  11/16/17 1606     Significant Indicator NEG     Source RESP     Site SPUTUM INDUCED     AFB Culture Culture in progress.     AFB Smear Results No acid fast bacilli seen.    Narrative:       Airborne,[id number] GABBY MEZA    ACID FAST STAIN [[id number]] Collected:  11/16/17 0330    Order Status:  Completed Specimen:  Respirate Updated:  11/16/17 1606     Significant Indicator NEG     Source RESP     Site SPUTUM INDUCED     AFB Smear Results No acid fast bacilli seen.    Narrative:       Airborne,[id number] GABBY MEZA    URINE CULTURE(NEW) [[id number]] Collected:  11/14/17 1054    Order Status:  Completed Specimen:  Urine from Urine, Clean Catch Updated:  11/16/17 0926     Significant Indicator NEG     Source UR     Site URINE, CLEAN CATCH     Urine Culture Mixed skin janice 10-50,000 cfu/mL    Narrative:       Airborne,[id number] TEO JOY  Indication for culture:->Emergency Room Patient     CULTURE RESPIRATORY W/ GRM STN [[id number]] Collected:  11/14/17 2135    Order Status:  Completed Specimen:  Respirate from Sputum Induced Updated:  11/16/17 0910     Gram Stain Result --     Specimen Quality Score: 1+  Moderate WBCs.  Few Yeast.  Many mixed bacteria, no predominant organism seen.       Significant Indicator NEG     Source RESP     Site SPUTUM INDUCED     Respiratory Culture --     Moderate growth usual upper respiratory janice  including yeast.      ACID FAST STAIN [[id number]] Collected:  11/14/17 1130    Order Status:  Completed Specimen:  Respirate Updated:  11/15/17 1826     Significant Indicator NEG     Source RESP     Site Sputum (coughed)     AFB Smear Results No acid fast bacilli seen.    Narrative:       Airborne,[id number] MUHAMMAD-PÉREZ LIO B    AFB CULTURE [[id number]] Collected:  11/14/17 1130    Order Status:  Completed Specimen:  Respirate from Sputum Induced Updated:  11/15/17 1826     Significant Indicator NEG     Source RESP     Site Sputum (coughed)     AFB Culture Culture in progress.     AFB Smear Results No acid fast bacilli seen.    Narrative:       Airborne,[id number] MUHAMMAD-PÉREZ LIO B    ACID FAST STAIN [[id number]] Collected:  11/14/17 2135    Order Status:  Completed Specimen:  Respirate Updated:  11/15/17 1826     Significant Indicator NEG     Source RESP     Site SPUTUM INDUCED     AFB Smear Results No acid fast bacilli seen.    Narrative:       Airborne,Droplet    AFB CULTURE [[id number]] Collected:  11/14/17 2135    Order Status:  Completed Specimen:  Respirate from Sputum Induced Updated:  11/15/17 1825     Significant Indicator NEG     Source RESP     Site SPUTUM INDUCED     AFB Culture Culture in progress.     AFB Smear Results No acid fast bacilli seen.    Narrative:       Airborne,Droplet    GRAM STAIN [[id number]] Collected:  11/14/17 2135    Order Status:  Completed Specimen:  Respirate Updated:  11/15/17 0836     Significant Indicator .     Source RESP  "    Site SPUTUM INDUCED     Gram Stain Result --     Specimen Quality Score: 1+  Moderate WBCs.  Few Yeast.  Many mixed bacteria, no predominant organism seen.      CULTURE RESPIRATORY W/ GRM STN [[id number]] Collected:  11/14/17 1130    Order Status:  Canceled Specimen:  Other from Sputum Induced Updated:  11/14/17 1322    URINALYSIS [[id number]]  (Abnormal) Collected:  11/14/17 1054    Order Status:  Completed Specimen:  Urine from Urine, Clean Catch Updated:  11/14/17 1148     Color Yellow     Character Clear     Specific Gravity 1.013     Ph 5.5     Glucose Negative mg/dL      Ketones Negative mg/dL      Protein Negative mg/dL      Bilirubin Negative     Urobilinogen, Urine 0.2     Nitrite Negative     Leukocyte Esterase Small (A)     Occult Blood Small (A)     Micro Urine Req Microscopic    Narrative:       Airborne,[id number] TEO SIN.  Indication for culture:->Emergency Room Patient    BLOOD CULTURE [[id number]] Collected:  11/13/17 2030    Order Status:  Completed Specimen:  Blood from Peripheral Updated:  11/14/17 0713     Significant Indicator NEG     Source BLD     Site PERIPHERAL     Blood Culture --     No Growth    Note: Blood cultures are incubated for 5 days and  are monitored continuously.Positive blood cultures  are called to the RN and reported as soon as  they are identified.      Narrative:       Per Hospital Policy: Only change Specimen Src: to \"Line\" if  specified by physician order.    BLOOD CULTURE [[id number]] Collected:  11/13/17 1740    Order Status:  Completed Specimen:  Blood from Peripheral Updated:  11/14/17 0713     Significant Indicator NEG     Source BLD     Site PERIPHERAL     Blood Culture --     No Growth    Note: Blood cultures are incubated for 5 days and  are monitored continuously.Positive blood cultures  are called to the RN and reported as soon as  they are identified.      Narrative:       Per Hospital Policy: Only change Specimen Src: to \"Line\" if  specified " by physician order.    URINALYSIS [[id number]]     Order Status:  Canceled Specimen:  Urine     URINE CULTURE(NEW) [[id number]]     Order Status:  Canceled Specimen:  Urine     Blood Culture [[id number]] Collected:  11/13/17 0000    Order Status:  Canceled Specimen:  Other from Peripheral     Blood Culture [[id number]] Collected:  11/13/17 0000    Order Status:  Canceled Specimen:  Other from Peripheral           Assessment:  Active Hospital Problems    Diagnosis   • Community acquired pneumonia [J18.9]   • Anemia [D64.9]   • Sepsis (CMS-HCC) [A41.9]     Plan:  Pneumonia-concern for TB, additional work  Increased fever  + leukocytosis  Work up neg so far  History, clinical course, and CXR concerning for TB   CT chest c/w TB-consolidation RUL +LAD  Pulm eval and bronch  Continue isolation while in hosp  FU AFB cxs  Plan for bronch in am  Check blood cxs  Change ceftriaxone to Unasyn    Thrombocytosis  Acute phase reactant  Should resolve with treatment infection  Increased today    Sepsis (CMS-[facility])- (present on admission)  Abx as above  Blood cxs previously neg  Ucx neg    Plan of care discussed with ADAN Hager

## 2017-11-19 NOTE — PROCEDURES
DATE OF OPERATION: 11/19/2017     PREOPERATIVE DIAGNOSIS: chest x-ray infiltrate     POSTOPERATIVE DIAGNOSIS: chest x-ray infiltrate     PROCEDURE PERFORMED: Fiberoptic bronchoscopy with bronchoalveolar lavage, brush RUL    SURGEON: Chriss Goldberg M.D.    ANESTHESIA:  Moderate sedation and local anesthesia    INDICATIONS: The patient is a 77 y.o. female with persistent RUL infiltrate/ground glass abnormalities    FINDINGS: Thin secretions  RUL and edematous RUL carinae    SPECIMEN: BAL, cytology brush    PROCEDURE:     Informed consent obtained from patient or designated decision maker after explaining the benefits/risks of the procedure including but not limited to bleeding, infection, airway trauma or loss therof, pneumothorax/hemothorax, arrythmia, or death. Patient or surrogate expressed understanding and agreement.    Following informed consent, a time out was performed.  The patient was properly identified and optimally positioned in bed. She was preoxygenated with 100% oxygen  Intravenous sedation and analgesia medications were administered.    The fiberoptic bronchoscope was advanced through the right nares.   Vocal cord sprayed with 4 ml local anesthetic.  2 mL of local anesthetic sprayed at the parvin (2% lidocaine) achieving appropriate comfort level for patient. Airways visualized directly and the following intervention was performed: BAL, wash, cytology brush RUL. Findings as below.     Medications used: fentanyl 100 mcg, midazolam 1 mg    Findings: Upper airway including vocal cords mild edema        Trachea to parvin - nl appearing mucosa without lesions or mass, Parvin sharp        R proximal and distal airways - nl appearing mucosa without mass/lesion/anatomic variance except for RUL where carinae were edematous, secretions: minimal, clear        L proximal and distal airways - nl appearing mucosa without mass/lesion/anatomic variance, secretions: minimal and clear        Samples - bal and cytology  brush    Complications: none  CXR (if applicable): patchy infiltrates with RUL density    The patient tolerated the procedure well. There were no apparent complications.    ____________________________________   Chriss Goldberg M.D.    DD: 11/19/2017  9:18 AM

## 2017-11-19 NOTE — PROGRESS NOTES
Assumed care of patient after receiving bedside report from night shift.  Patient is in a negative pressure room for rule out TB, although 2 AFB sputums yesterday were negative.  The doctor ordered three more.

## 2017-11-19 NOTE — PROGRESS NOTES
[facility]    Date of Service: 2017    Chief Complaint  77 y.o. female admitted 2017 with cough and CAP.  Imaging and Hx concerning for TB    Interval Problem Update  Pt cont's to c/o cough.  States she feels about the same    Consultants/Specialty  Pulmonology  ID    Disposition  Cont in isolation        Review of Systems   Constitutional: Negative for chills and fever.   Respiratory: Positive for cough, sputum production and shortness of breath.    Cardiovascular: Negative for chest pain.   Gastrointestinal: Negative for abdominal pain, diarrhea, nausea and vomiting.   Musculoskeletal: Negative for back pain.   Skin: Negative for rash.   Neurological: Negative for dizziness, loss of consciousness and headaches.      Physical Exam  Laboratory/Imaging   Hemodynamics  Temp (24hrs), Av.9 °C (98.4 °F), Min:36.7 °C (98 °F), Max:37.1 °C (98.7 °F)   Temperature: 36.7 °C (98 °F)  Pulse  Av  Min: 75  Max: 134 Heart Rate (Monitored): 91  Blood Pressure : 102/60, NIBP: 113/66      Respiratory      Respiration: 15, Pulse Oximetry: 96 %     Work Of Breathing / Effort: Mild  RUL Breath Sounds: Fine Crackles, RML Breath Sounds: Fine Crackles, RLL Breath Sounds: Fine Crackles, RENEE Breath Sounds: Fine Crackles, LLL Breath Sounds: Fine Crackles    Fluids    Intake/Output Summary (Last 24 hours) at 17 1553  Last data filed at 17 1200   Gross per 24 hour   Intake              600 ml   Output                0 ml   Net              600 ml       Nutrition  Orders Placed This Encounter   Procedures   • DIET ORDER     Standing Status:   Standing     Number of Occurrences:   1     Order Specific Question:   Diet:     Answer:   Regular [1]     Physical Exam   Constitutional: She is oriented to person, place, and time. She appears well-developed and well-nourished. No distress.   HENT:   Head: Normocephalic and atraumatic.   Neck: No JVD present.   Cardiovascular: Normal rate and regular  rhythm.    Pulmonary/Chest: Effort normal. No stridor. No respiratory distress. She has no wheezes. She has rales.   Abdominal: Soft. There is no tenderness. There is no rebound and no guarding.   Musculoskeletal: She exhibits no edema.   Neurological: She is oriented to person, place, and time.   Skin: Skin is warm and dry. No rash noted. She is not diaphoretic.   Psychiatric: She has a normal mood and affect. Thought content normal.   Nursing note and vitals reviewed.      Recent Labs      11/17/17   0441  11/18/17   0253  11/19/17   0158   WBC  13.2*  14.0*  11.3*   RBC  2.98*  3.30*  3.23*   HEMOGLOBIN  9.0*  9.7*  9.8*   HEMATOCRIT  27.1*  29.9*  28.9*   MCV  90.9  90.6  89.5   MCH  30.2  29.4  30.3   MCHC  33.2*  32.4*  33.9   RDW  41.7  41.7  41.3   PLATELETCT  582*  706*  594*   MPV  8.6*  8.7*  8.1*     Recent Labs      11/18/17 0253  11/19/17   0158   SODIUM  136  136   POTASSIUM  4.0  3.8   CHLORIDE  99  100   CO2  26  26   GLUCOSE  85  95   BUN  4*  6*   CREATININE  0.59  0.62   CALCIUM  9.3  8.8                      Assessment/Plan     Community acquired pneumonia- (present on admission)   Assessment & Plan    Unclear pathogen  Travel through [facility]  eval for TB and atypicals        Anemia- (present on admission)   Assessment & Plan    Iron deficiency  From ? Uterine prolapse        Sepsis (CMS-HCC)- (present on admission)   Assessment & Plan    Mild/simple, c/w measures            Reviewed items::  Radiology images reviewed, EKG reviewed, Labs reviewed and Medications reviewed  Wilson catheter::  No Wilson  DVT prophylaxis pharmacological::  Not indicated at this time, ambulatory  DVT prophylaxis - mechanical:  SCDs  Ulcer Prophylaxis::  Not indicated  Antibiotics:  Treating active infection/contamination beyond 24 hours perioperative coverage

## 2017-11-19 NOTE — CARE PLAN
Problem: Safety  Goal: Will remain free from injury  Outcome: PROGRESSING AS EXPECTED  Bed in low position.  Treaded socks on patient.  Call light within reach.  Saline locked.  Bedrails closest to bathroom down.  Patient educated to call for assistance.       Problem: Pain Management  Goal: Pain level will decrease to patient's comfort goal  Outcome: PROGRESSING AS EXPECTED  Assessing pt. For pain.  Pt. Denies any pain.  Pt. Appears comfortable and is resting.

## 2017-11-19 NOTE — PROGRESS NOTES
Infectious Disease Progress Note    Author: Leida Frye M.D. Date & Time of service: 2017  1:08 PM    Chief Complaint:  FU PNA    Interval History:   77 y.o. Merly admitted 2017 for pneumonia   AF (99.9) WBC 13.2 minimal cough-denies SE abx   Increased temp 101.5, WBC 14 plan for bronch in am   AF WBC 11.3 cough with clear sputum post-bronch  Labs Reviewed, Medications Reviewed and Radiology Reviewed.    Review of Systems:  Review of Systems   Constitutional: Positive for weight loss. Negative for chills and fever.   Respiratory: Positive for cough and sputum production. Negative for shortness of breath.    Cardiovascular: Positive for chest pain.        With cough   Gastrointestinal: Negative for abdominal pain, diarrhea, nausea and vomiting.   Neurological: Positive for weakness.       Hemodynamics:  Temp (24hrs), Av.4 °C (99.3 °F), Min:36.9 °C (98.4 °F), Max:38.6 °C (101.5 °F)  Temperature: 36.9 °C (98.4 °F)  Pulse  Av.5  Min: 75  Max: 134Heart Rate (Monitored): 91  Blood Pressure : 137/76, NIBP: 113/66       Physical Exam:  Physical Exam   Constitutional: She is oriented to person, place, and time. She appears well-developed. No distress.   HENT:   Head: Normocephalic and atraumatic.   Eyes: EOM are normal. Pupils are equal, round, and reactive to light.   Cardiovascular: Normal rate and regular rhythm.    Pulmonary/Chest: Effort normal. No respiratory distress. She has no wheezes. She has rales.   Abdominal: Soft. She exhibits no distension. There is no tenderness.   Musculoskeletal: She exhibits edema.   Neurological: She is alert and oriented to person, place, and time.   Skin: No rash noted.   Nursing note and vitals reviewed.      Meds:    Current Facility-Administered Medications:   •  benzonatate  •  temazepam  •  ampicillin-sulbactam (UNASYN) IV  •  polyethylene glycol/lytes  •  famotidine  •  hyoscyamine-maalox plus-lidocaine viscous  •  enoxaparin  (LOVENOX) injection  •  sucralfate  •  fluticasone  •  loratadine  •  senna-docusate **AND** polyethylene glycol/lytes **AND** magnesium hydroxide **AND** bisacodyl  •  Respiratory Care per Protocol  •  NS  •  acetaminophen  •  labetalol  •  ondansetron  •  ondansetron    Labs:  Recent Labs      11/17/17   0441  11/18/17   0253  11/19/17   0158   WBC  13.2*  14.0*  11.3*   RBC  2.98*  3.30*  3.23*   HEMOGLOBIN  9.0*  9.7*  9.8*   HEMATOCRIT  27.1*  29.9*  28.9*   MCV  90.9  90.6  89.5   MCH  30.2  29.4  30.3   RDW  41.7  41.7  41.3   PLATELETCT  582*  706*  594*   MPV  8.6*  8.7*  8.1*   NEUTSPOLYS  67.30  70.40  70.10   LYMPHOCYTES  20.50*  18.50*  16.00*   MONOCYTES  7.10  5.50  6.90   EOSINOPHILS  2.60  3.10  4.80   BASOPHILS  0.30  0.60  0.40     Recent Labs      11/18/17 0253  11/19/17   0158   SODIUM  136  136   POTASSIUM  4.0  3.8   CHLORIDE  99  100   CO2  26  26   GLUCOSE  85  95   BUN  4*  6*     Recent Labs      11/18/17 0253 11/19/17   0158   ALBUMIN  3.1*   --    TBILIRUBIN  0.3   --    ALKPHOSPHAT  89   --    TOTPROTEIN  7.3   --    ALTSGPT  15   --    ASTSGOT  16   --    CREATININE  0.59  0.62       Imaging:  Ct-chest (thorax) With    Result Date: 11/16/2017 11/16/2017 6:55 PM HISTORY/REASON FOR EXAM:  Shortness of breath fever cough with history of pneumonia TECHNIQUE/EXAM DESCRIPTION: CT scan of the chest with contrast. Thin-section helical images were obtained from the lung apices through the adrenal glands following the bolus administration of 80ml mL of Optiray 350 nonionic contrast. Low dose optimization technique was utilized for this CT exam including automated exposure control and adjustment of the mA and/or kV according to patient size. COMPARISON:  None FINDINGS: Extensive consolidation is noted in the right upper lobe extending to the right lung apex. Surrounding areas of groundglass opacification are noted in the right upper lobe. Additional scattered multifocal irregular shaped and  nodular opacifications with ill-defined borders are noted in both lungs. Bronchiectasis is also present bilaterally. No pleural effusions are identified. There is no pneumothorax. Mild upper mediastinal and pretracheal adenopathy is noted. There is no evidence of pericardial effusion. No large masses are seen within the upper abdomen.     1.  Consolidation is identified which is prominent in the right lung apex. Findings could be due to pneumonia. Tuberculosis is a possibility. 2.  Surrounding groundglass opacification is also noted in the right upper lobe. 3.  Additional scattered multifocal opacifications are noted in each lung. Bronchiectasis is also present. Findings are likely due to infectious or inflammatory process. 4.  Mildly enlarged lymph nodes are noted in the upper mediastinum and pretracheal area.     Dx-chest-2 Views    Result Date: 11/11/2017 11/11/2017 10:22 AM HISTORY/REASON FOR EXAM:  Shortness of Breath Cough, intermittent fever TECHNIQUE/EXAM DESCRIPTION AND NUMBER OF VIEWS: Two views of the chest. COMPARISON:  None available. FINDINGS: Cardiac contour is mildly prominent. Patchy parenchymal and interstitial opacities in both lungs. Focal opacity at the RIGHT lung apex. No pleural fluid collection or pneumothorax. Degenerative change of thoracic spine.     1.  Patchy interstitial and alveolar opacities in both lungs, likely inflammatory. 2.  Focal opacity RIGHT lung apex may represent mass or focal inflammatory process. 3.  Tuberculosis is not excluded.    Dx-chest-portable (1 View)    Result Date: 11/13/2017 11/13/2017 8:14 PM HISTORY/REASON FOR EXAM:  Possible sepsis TECHNIQUE/EXAM DESCRIPTION:  Single AP view of the chest. COMPARISON: Today at 1012 FINDINGS: The cardiac silhouette appears within normal limits. Atherosclerotic calcification of the aorta is noted.  The central pulmonary vasculature appears normal. The lungs appear well expanded bilaterally.  Scattered patchy bilateral  "pulmonary opacities are seen, greatest in the right apex. No significant pleural effusions are identified. The bony structures appear age-appropriate.     1.  Patchy bilateral pulmonary opacities, greatest in the right apex, stable since prior, most compatible with infiltrates. Radiographic follow-up to resolution recommended. As previously recommended exclusion of tuberculosis as clinically warranted. 2.  Atherosclerosis      Micro:  Results     Procedure Component Value Units Date/Time    AFB [[id number]] Collected:  11/19/17 0830    Order Status:  Completed Specimen:  Respirate from Bronchoalveolar Lavage Updated:  11/19/17 1009    Narrative:       Airborne,[id number] GREEN JIMY L  Which Lobe (Bronch Only):->RUL    RESP CULTURE [[id number]] Collected:  11/19/17 0830    Order Status:  Completed Specimen:  Respirate from Bronchoalveolar Lavage Updated:  11/19/17 1009    Narrative:       Airborne,[id number] GREEN JIMY L  Which Lobe (Bronch Only):->RUL    BLOOD CULTURE [[id number]] Collected:  11/18/17 1927    Order Status:  Completed Specimen:  Blood from Peripheral Updated:  11/19/17 0901     Significant Indicator NEG     Source BLD     Site PERIPHERAL     Blood Culture --     No Growth    Note: Blood cultures are incubated for 5 days and  are monitored continuously.Positive blood cultures  are called to the RN and reported as soon as  they are identified.      Narrative:       Airborne,Droplet  Per Hospital Policy: Only change Specimen Src: to \"Line\" if  specified by physician order.    BLOOD CULTURE [[id number]] Collected:  11/18/17 1928    Order Status:  Completed Specimen:  Blood from Peripheral Updated:  11/19/17 0901     Significant Indicator NEG     Source BLD     Site PERIPHERAL     Blood Culture --     No Growth    Note: Blood cultures are incubated for 5 days and  are monitored continuously.Positive blood cultures  are called to the RN and reported as soon as  they are identified.      Narrative:    " "   Airborne,Droplet  Per Hospital Policy: Only change Specimen Src: to \"Line\" if  specified by physician order.    BLOOD CULTURE [[id number]] Collected:  11/13/17 2030    Order Status:  Completed Specimen:  Blood from Peripheral Updated:  11/18/17 2300     Significant Indicator NEG     Source BLD     Site PERIPHERAL     Blood Culture No growth after 5 days of incubation.    Narrative:       Per Hospital Policy: Only change Specimen Src: to \"Line\" if  specified by physician order.    GRAM STAIN [[id number]] Collected:  11/18/17 0949    Order Status:  Completed Specimen:  Respirate Updated:  11/18/17 2119     Significant Indicator .     Source RESP     Site SPUTUM INDUCED     Gram Stain Result --     Many WBCs.  Many mixed bacteria, no predominant organism seen.  Few epithelial cells.  Specimen Quality Score: 2+      Narrative:       Airborne,[id number] ALMA ROSA KOURTNIE S  For AFB , indiced    BLOOD CULTURE [[id number]] Collected:  11/13/17 1740    Order Status:  Completed Specimen:  Blood from Peripheral Updated:  11/18/17 1900     Significant Indicator NEG     Source BLD     Site PERIPHERAL     Blood Culture No growth after 5 days of incubation.    Narrative:       Per Hospital Policy: Only change Specimen Src: to \"Line\" if  specified by physician order.    CULTURE RESPIRATORY W/ GRM STN [[id number]] Collected:  11/18/17 0949    Order Status:  Completed Specimen:  Respirate from Sputum Induced Updated:  11/18/17 0954    Narrative:       Airborne,[id number] ALMA ROSA KOURTNIE S  For AFB , indiced    CULTURE RESPIRATORY W/ GRM STN [[id number]] Collected:  11/18/17 0949    Order Status:  Sent Specimen:  Respirate from Sputum Induced     CULTURE RESPIRATORY W/ GRM STN [[id number]] Collected:  11/18/17 0949    Order Status:  Sent Specimen:  Respirate from Sputum Induced     AFB CULTURE [[id number]] Collected:  11/16/17 0330    Order Status:  Completed Specimen:  Respirate from Sputum Induced Updated:  11/16/17 1606     " Significant Indicator NEG     Source RESP     Site SPUTUM INDUCED     AFB Culture Culture in progress.     AFB Smear Results No acid fast bacilli seen.    Narrative:       Airborne,[id number] GABBY MEZA    ACID FAST STAIN [[id number]] Collected:  11/16/17 0330    Order Status:  Completed Specimen:  Respirate Updated:  11/16/17 1606     Significant Indicator NEG     Source RESP     Site SPUTUM INDUCED     AFB Smear Results No acid fast bacilli seen.    Narrative:       Airborne,[id number] GABBY MEZA    URINE CULTURE(NEW) [[id number]] Collected:  11/14/17 1054    Order Status:  Completed Specimen:  Urine from Urine, Clean Catch Updated:  11/16/17 0926     Significant Indicator NEG     Source UR     Site URINE, CLEAN CATCH     Urine Culture Mixed skin janice 10-50,000 cfu/mL    Narrative:       Airborne,[id number] TEO JOY  Indication for culture:->Emergency Room Patient    CULTURE RESPIRATORY W/ GRM STN [[id number]] Collected:  11/14/17 2135    Order Status:  Completed Specimen:  Respirate from Sputum Induced Updated:  11/16/17 0910     Gram Stain Result --     Specimen Quality Score: 1+  Moderate WBCs.  Few Yeast.  Many mixed bacteria, no predominant organism seen.       Significant Indicator NEG     Source RESP     Site SPUTUM INDUCED     Respiratory Culture --     Moderate growth usual upper respiratory janice  including yeast.      ACID FAST STAIN [[id number]] Collected:  11/14/17 1130    Order Status:  Completed Specimen:  Respirate Updated:  11/15/17 1826     Significant Indicator NEG     Source RESP     Site Sputum (coughed)     AFB Smear Results No acid fast bacilli seen.    Narrative:       Airborne,[id number] TJ VACA B    AFB CULTURE [[id number]] Collected:  11/14/17 1130    Order Status:  Completed Specimen:  Respirate from Sputum Induced Updated:  11/15/17 1826     Significant Indicator NEG     Source RESP     Site Sputum (coughed)     AFB Culture Culture in  progress.     AFB Smear Results No acid fast bacilli seen.    Narrative:       Airborne,[id number] TJ VACA B    ACID FAST STAIN [[id number]] Collected:  11/14/17 2135    Order Status:  Completed Specimen:  Respirate Updated:  11/15/17 1826     Significant Indicator NEG     Source RESP     Site SPUTUM INDUCED     AFB Smear Results No acid fast bacilli seen.    Narrative:       Airborne,Droplet    AFB CULTURE [[id number]] Collected:  11/14/17 2135    Order Status:  Completed Specimen:  Respirate from Sputum Induced Updated:  11/15/17 1825     Significant Indicator NEG     Source RESP     Site SPUTUM INDUCED     AFB Culture Culture in progress.     AFB Smear Results No acid fast bacilli seen.    Narrative:       Airborne,Droplet    GRAM STAIN [[id number]] Collected:  11/14/17 2135    Order Status:  Completed Specimen:  Respirate Updated:  11/15/17 0836     Significant Indicator .     Source RESP     Site SPUTUM INDUCED     Gram Stain Result --     Specimen Quality Score: 1+  Moderate WBCs.  Few Yeast.  Many mixed bacteria, no predominant organism seen.      CULTURE RESPIRATORY W/ GRM STN [[id number]] Collected:  11/14/17 1130    Order Status:  Canceled Specimen:  Other from Sputum Induced Updated:  11/14/17 1322    URINALYSIS [[id number]]  (Abnormal) Collected:  11/14/17 1054    Order Status:  Completed Specimen:  Urine from Urine, Clean Catch Updated:  11/14/17 1148     Color Yellow     Character Clear     Specific Gravity 1.013     Ph 5.5     Glucose Negative mg/dL      Ketones Negative mg/dL      Protein Negative mg/dL      Bilirubin Negative     Urobilinogen, Urine 0.2     Nitrite Negative     Leukocyte Esterase Small (A)     Occult Blood Small (A)     Micro Urine Req Microscopic    Narrative:       Airborne,[id number] TEO JOY  Indication for culture:->Emergency Room Patient    URINALYSIS [[id number]]     Order Status:  Canceled Specimen:  Urine     URINE CULTURE(NEW) [[id number]]      Order Status:  Canceled Specimen:  Urine     Blood Culture [[id number]] Collected:  11/13/17 0000    Order Status:  Canceled Specimen:  Other from Peripheral     Blood Culture [[id number]] Collected:  11/13/17 0000    Order Status:  Canceled Specimen:  Other from Peripheral           Assessment:  Active Hospital Problems    Diagnosis   • Community acquired pneumonia [J18.9]   • Anemia [D64.9]   • Sepsis (CMS-HCC) [A41.9]     Plan:  Pneumonia-concern for TB, additional work  Increased fever  + leukocytosis  Work up neg so far  History, clinical course, and CXR concerning for TB   CT chest c/w TB-consolidation RUL +LAD  Pulm eval and bronch  Continue isolation while in hosp  FU AFB cxs  Blood cxs 11/18 neg  s/p bronch-follow results  Continue Unasyn    Thrombocytosis  Acute phase reactant  Should resolve with treatment infection  Increased today    Sepsis (CMS-[facility])- (present on admission)  Abx as above  Blood cxs previously neg  Ucx neg    Plan of care discussed with ADAN Nieto

## 2017-11-20 PROCEDURE — 700111 HCHG RX REV CODE 636 W/ 250 OVERRIDE (IP): Performed by: INTERNAL MEDICINE

## 2017-11-20 PROCEDURE — A9270 NON-COVERED ITEM OR SERVICE: HCPCS | Performed by: INTERNAL MEDICINE

## 2017-11-20 PROCEDURE — 770027 HCHG ROOM/CARE - NEGATIVE PRESSURE ISOLATION

## 2017-11-20 PROCEDURE — 700102 HCHG RX REV CODE 250 W/ 637 OVERRIDE(OP): Performed by: HOSPITALIST

## 2017-11-20 PROCEDURE — 700105 HCHG RX REV CODE 258: Performed by: INTERNAL MEDICINE

## 2017-11-20 PROCEDURE — 99232 SBSQ HOSP IP/OBS MODERATE 35: CPT | Performed by: HOSPITALIST

## 2017-11-20 PROCEDURE — A9270 NON-COVERED ITEM OR SERVICE: HCPCS | Performed by: HOSPITALIST

## 2017-11-20 PROCEDURE — 700111 HCHG RX REV CODE 636 W/ 250 OVERRIDE (IP): Performed by: HOSPITALIST

## 2017-11-20 PROCEDURE — 700102 HCHG RX REV CODE 250 W/ 637 OVERRIDE(OP): Performed by: INTERNAL MEDICINE

## 2017-11-20 RX ADMIN — SUCRALFATE 1 G: 1 TABLET ORAL at 22:22

## 2017-11-20 RX ADMIN — BENZONATATE 100 MG: 100 CAPSULE ORAL at 05:04

## 2017-11-20 RX ADMIN — SUCRALFATE 1 G: 1 TABLET ORAL at 14:50

## 2017-11-20 RX ADMIN — FAMOTIDINE 20 MG: 20 TABLET, FILM COATED ORAL at 22:22

## 2017-11-20 RX ADMIN — TAZOBACTAM SODIUM AND PIPERACILLIN SODIUM 4.5 G: 500; 4 INJECTION, SOLUTION INTRAVENOUS at 22:22

## 2017-11-20 RX ADMIN — TAZOBACTAM SODIUM AND PIPERACILLIN SODIUM 4.5 G: 500; 4 INJECTION, SOLUTION INTRAVENOUS at 20:03

## 2017-11-20 RX ADMIN — SUCRALFATE 1 G: 1 TABLET ORAL at 03:57

## 2017-11-20 RX ADMIN — SUCRALFATE 1 G: 1 TABLET ORAL at 09:14

## 2017-11-20 RX ADMIN — AMPICILLIN SODIUM AND SULBACTAM SODIUM 3 G: 2; 1 INJECTION, POWDER, FOR SOLUTION INTRAMUSCULAR; INTRAVENOUS at 13:30

## 2017-11-20 RX ADMIN — LORATADINE 10 MG: 10 TABLET ORAL at 09:14

## 2017-11-20 RX ADMIN — AMPICILLIN SODIUM AND SULBACTAM SODIUM 3 G: 2; 1 INJECTION, POWDER, FOR SOLUTION INTRAMUSCULAR; INTRAVENOUS at 05:04

## 2017-11-20 RX ADMIN — FLUTICASONE PROPIONATE 50 MCG: 50 SPRAY, METERED NASAL at 09:14

## 2017-11-20 RX ADMIN — FAMOTIDINE 20 MG: 20 TABLET, FILM COATED ORAL at 09:14

## 2017-11-20 RX ADMIN — ENOXAPARIN SODIUM 30 MG: 100 INJECTION SUBCUTANEOUS at 09:14

## 2017-11-20 RX ADMIN — AMPICILLIN SODIUM AND SULBACTAM SODIUM 3 G: 2; 1 INJECTION, POWDER, FOR SOLUTION INTRAMUSCULAR; INTRAVENOUS at 00:01

## 2017-11-20 RX ADMIN — STANDARDIZED SENNA CONCENTRATE AND DOCUSATE SODIUM 2 TABLET: 8.6; 5 TABLET, FILM COATED ORAL at 09:15

## 2017-11-20 ASSESSMENT — ENCOUNTER SYMPTOMS
FEVER: 0
ABDOMINAL PAIN: 0
BACK PAIN: 0
LOSS OF CONSCIOUSNESS: 0
CHILLS: 0
COUGH: 1
HEADACHES: 0
SHORTNESS OF BREATH: 0
DIARRHEA: 0
WEIGHT LOSS: 1
SPUTUM PRODUCTION: 1
VOMITING: 0
WEAKNESS: 0
DIZZINESS: 0
SHORTNESS OF BREATH: 1
NAUSEA: 0

## 2017-11-20 ASSESSMENT — PAIN SCALES - GENERAL
PAINLEVEL_OUTOF10: 0

## 2017-11-20 NOTE — CARE PLAN
Problem: Communication  Goal: The ability to communicate needs accurately and effectively will improve    Intervention: Educate patient and significant other/support system about the plan of care, procedures, treatments, medications and allow for questions  Bedside report completed. Patient educated on plan of care, call light, and communication board. Patient verbalizes understanding.         Problem: Safety  Goal: Will remain free from injury  Outcome: PROGRESSING AS EXPECTED  Bed in low position.  Treaded socks on patient.  Call light within reach.  Saline locked.  Bedrails closest to bathroom down.  Patient educated to call for assistance.

## 2017-11-20 NOTE — PROGRESS NOTES
Assumed care of patient after receiving bedside report from night shift.  The pt is NPO and ready to go for bronchoscopy

## 2017-11-20 NOTE — PROGRESS NOTES
[facility] Hospitalist Progress Note    Date of Service: 2017    Chief Complaint  77 y.o. female admitted 2017 with cough and CAP.  Imaging and Hx concerning for TB    Interval Problem Update  Pt cont's to c/o cough.  sx's are about the same    Consultants/Specialty  Pulmonology  ID    Disposition  Cont in isolation        Review of Systems   Constitutional: Negative for chills and fever.   Respiratory: Positive for cough, sputum production and shortness of breath.    Cardiovascular: Negative for chest pain.   Gastrointestinal: Negative for abdominal pain, diarrhea, nausea and vomiting.   Musculoskeletal: Negative for back pain.   Skin: Negative for rash.   Neurological: Negative for dizziness, loss of consciousness and headaches.      Physical Exam  Laboratory/Imaging   Hemodynamics  Temp (24hrs), Av °C (98.6 °F), Min:36.7 °C (98 °F), Max:37.3 °C (99.2 °F)   Temperature: 36.7 °C (98.1 °F)  Pulse  Av  Min: 75  Max: 134    Blood Pressure : 124/80      Respiratory      Respiration: 18, Pulse Oximetry: 91 %     Work Of Breathing / Effort: Mild  RUL Breath Sounds: Clear, RML Breath Sounds: Fine Crackles, RLL Breath Sounds: Fine Crackles, RENEE Breath Sounds: Clear, LLL Breath Sounds: Fine Crackles    Fluids    Intake/Output Summary (Last 24 hours) at 17 1420  Last data filed at 17 0500   Gross per 24 hour   Intake              660 ml   Output                0 ml   Net              660 ml       Nutrition  Orders Placed This Encounter   Procedures   • DIET ORDER     Standing Status:   Standing     Number of Occurrences:   1     Order Specific Question:   Diet:     Answer:   Regular [1]     Physical Exam   Constitutional: She is oriented to person, place, and time. She appears well-developed and well-nourished. No distress.   HENT:   Head: Normocephalic and atraumatic.   Neck: No JVD present.   Cardiovascular: Normal rate and regular rhythm.    Pulmonary/Chest: Effort normal. No stridor. No  respiratory distress. She has no wheezes. She has rales.   Abdominal: Soft. There is no tenderness. There is no rebound and no guarding.   Musculoskeletal: She exhibits no edema.   Neurological: She is oriented to person, place, and time.   Skin: Skin is warm and dry. No rash noted. She is not diaphoretic.   Psychiatric: She has a normal mood and affect. Thought content normal.   Nursing note and vitals reviewed.      Recent Labs      11/18/17 0253 11/19/17   0158   WBC  14.0*  11.3*   RBC  3.30*  3.23*   HEMOGLOBIN  9.7*  9.8*   HEMATOCRIT  29.9*  28.9*   MCV  90.6  89.5   MCH  29.4  30.3   MCHC  32.4*  33.9   RDW  41.7  41.3   PLATELETCT  706*  594*   MPV  8.7*  8.1*     Recent Labs      11/18/17 0253 11/19/17 0158   SODIUM  136  136   POTASSIUM  4.0  3.8   CHLORIDE  99  100   CO2  26  26   GLUCOSE  85  95   BUN  4*  6*   CREATININE  0.59  0.62   CALCIUM  9.3  8.8                      Assessment/Plan     Community acquired pneumonia- (present on admission)   Assessment & Plan    Unclear pathogen  Travel through [facility]  Imaging quite concerning for TB  BAL done 11/19  ID following  eval for TB and atypicals        Anemia- (present on admission)   Assessment & Plan    Iron deficiency  From ? Uterine prolapse        Sepsis (CMS-HCC)- (present on admission)   Assessment & Plan    Mild/simple, c/w measures            Reviewed items::  Radiology images reviewed, EKG reviewed, Labs reviewed and Medications reviewed  Wilson catheter::  No Wilson  DVT prophylaxis pharmacological::  Not indicated at this time, ambulatory  DVT prophylaxis - mechanical:  SCDs  Ulcer Prophylaxis::  Not indicated  Antibiotics:  Treating active infection/contamination beyond 24 hours perioperative coverage

## 2017-11-20 NOTE — PROGRESS NOTES
Vicky Chen Fall Risk Assessment:     Last Known Fall: Within the last six months  Mobility: No limitations  Medications: Cardiovascular or central nervous system meds  Mental Status/LOC/Awareness: Awake, alert, and oriented to date, place, and person  Toileting Needs: No needs  Volume/Electrolyte Status: No problems  Communication/Sensory: Visual (Glasses)/hearing deficit  Behavior: Appropriate behavior  Vicky Chen Fall Risk Total: 7  Fall Risk Level: LOW RISK    Universal Fall Precautions:  call light/belongings in reach, bed in low position and locked, wheelchairs and assistive devices out of sight, siderails up x 2, use non-slip footwear, clutter free and spill free environment, adequate lighting, educate on level of risk, educate to call for assistance    Fall Risk Level Interventions:   TRIAL (AppTrigger, NEURO, MED EMIL 5) Low Fall Risk Interventions  Place yellow fall risk ID band on patient: verified  Provide patient/family education based on risk assessment: completed  Educate patient/family to call staff for assistance when getting out of bed: completed  Place fall precaution signage outside patient door: verifiedTRIAL (AppTrigger, NEURO, MED EMIL 5) Moderate Fall Risk Interventions  Place yellow fall risk ID band on patient: verified  Provide patient/family education based on risk assessment : verified  Educate patient/family to call staff for assistance when getting out of bed: verified  Place fall precaution signage outside patient door: verified  Utilize bed/chair fall alarm: verified     Patient Specific Interventions:     Medication: review medications with patient and family  Mental Status/LOC/Awareness: reinforce the use of call light  Toileting: provide frquent toileting  Volume/Electrolyte Status: ensure patient remains hydrated  Communication/Sensory: update plan of care on whiteboard  Behavioral: engage patient in daily activities and administer medication as ordered  Mobility: ensure bed is  locked and in lowest position

## 2017-11-20 NOTE — PROGRESS NOTES
Infectious Disease Progress Note    Author: Nika Rangel M.D. Date & Time of service: 2017  9:48 AM    Chief Complaint:  FU PNA    Interval History:   77 y.o. [place] woman admitted 2017 for pneumonia   AF (99.9) WBC 13.2 minimal cough-denies SE abx   Increased temp 101.5, WBC 14 plan for bronch in am   AF WBC 11.3 cough with clear sputum post-bronch   AF, no CBC, feels better, denies any weakness, some phlegm and improved cough, poor understanding of illness  Labs Reviewed, Medications Reviewed and Radiology Reviewed.    Review of Systems:  Review of Systems   Constitutional: Positive for weight loss. Negative for chills and fever.   Respiratory: Positive for cough and sputum production. Negative for shortness of breath.    Cardiovascular: Positive for chest pain.        With cough   Gastrointestinal: Negative for abdominal pain, diarrhea, nausea and vomiting.   Neurological: Negative for weakness.       Hemodynamics:  Temp (24hrs), Av.1 °C (98.7 °F), Min:36.7 °C (98 °F), Max:37.3 °C (99.2 °F)  Temperature: 36.9 °C (98.4 °F)  Pulse  Av  Min: 75  Max: 134   Blood Pressure : 114/69       Physical Exam:  Physical Exam   Constitutional: She is oriented to person, place, and time. She appears well-developed. No distress.   HENT:   Head: Normocephalic and atraumatic.   Eyes: EOM are normal. Pupils are equal, round, and reactive to light.   Cardiovascular: Normal rate and regular rhythm.    Pulmonary/Chest: Effort normal. No respiratory distress. She has no wheezes. She has rales.   Abdominal: Soft. She exhibits no distension. There is no tenderness.   Musculoskeletal: She exhibits edema.   Neurological: She is alert and oriented to person, place, and time.   Skin: No rash noted.   Nursing note and vitals reviewed.      Meds:    Current Facility-Administered Medications:   •  benzonatate  •  temazepam  •  ampicillin-sulbactam (UNASYN) IV  •  polyethylene glycol/lytes  •   famotidine  •  hyoscyamine-maalox plus-lidocaine viscous  •  enoxaparin (LOVENOX) injection  •  sucralfate  •  fluticasone  •  loratadine  •  senna-docusate **AND** polyethylene glycol/lytes **AND** magnesium hydroxide **AND** bisacodyl  •  Respiratory Care per Protocol  •  NS  •  acetaminophen  •  labetalol  •  ondansetron  •  ondansetron    Labs:  Recent Labs      11/18/17 0253 11/19/17 0158   WBC  14.0*  11.3*   RBC  3.30*  3.23*   HEMOGLOBIN  9.7*  9.8*   HEMATOCRIT  29.9*  28.9*   MCV  90.6  89.5   MCH  29.4  30.3   RDW  41.7  41.3   PLATELETCT  706*  594*   MPV  8.7*  8.1*   NEUTSPOLYS  70.40  70.10   LYMPHOCYTES  18.50*  16.00*   MONOCYTES  5.50  6.90   EOSINOPHILS  3.10  4.80   BASOPHILS  0.60  0.40     Recent Labs      11/18/17 0253 11/19/17 0158   SODIUM  136  136   POTASSIUM  4.0  3.8   CHLORIDE  99  100   CO2  26  26   GLUCOSE  85  95   BUN  4*  6*     Recent Labs      11/18/17 0253 11/19/17 0158   ALBUMIN  3.1*   --    TBILIRUBIN  0.3   --    ALKPHOSPHAT  89   --    TOTPROTEIN  7.3   --    ALTSGPT  15   --    ASTSGOT  16   --    CREATININE  0.59  0.62       Imaging:  Ct-chest (thorax) With    Result Date: 11/16/2017 11/16/2017 6:55 PM HISTORY/REASON FOR EXAM:  Shortness of breath fever cough with history of pneumonia TECHNIQUE/EXAM DESCRIPTION: CT scan of the chest with contrast. Thin-section helical images were obtained from the lung apices through the adrenal glands following the bolus administration of 80ml mL of Optiray 350 nonionic contrast. Low dose optimization technique was utilized for this CT exam including automated exposure control and adjustment of the mA and/or kV according to patient size. COMPARISON:  None FINDINGS: Extensive consolidation is noted in the right upper lobe extending to the right lung apex. Surrounding areas of groundglass opacification are noted in the right upper lobe. Additional scattered multifocal irregular shaped and nodular opacifications with  ill-defined borders are noted in both lungs. Bronchiectasis is also present bilaterally. No pleural effusions are identified. There is no pneumothorax. Mild upper mediastinal and pretracheal adenopathy is noted. There is no evidence of pericardial effusion. No large masses are seen within the upper abdomen.     1.  Consolidation is identified which is prominent in the right lung apex. Findings could be due to pneumonia. Tuberculosis is a possibility. 2.  Surrounding groundglass opacification is also noted in the right upper lobe. 3.  Additional scattered multifocal opacifications are noted in each lung. Bronchiectasis is also present. Findings are likely due to infectious or inflammatory process. 4.  Mildly enlarged lymph nodes are noted in the upper mediastinum and pretracheal area.     Dx-chest-2 Views    Result Date: 11/11/2017 11/11/2017 10:22 AM HISTORY/REASON FOR EXAM:  Shortness of Breath Cough, intermittent fever TECHNIQUE/EXAM DESCRIPTION AND NUMBER OF VIEWS: Two views of the chest. COMPARISON:  None available. FINDINGS: Cardiac contour is mildly prominent. Patchy parenchymal and interstitial opacities in both lungs. Focal opacity at the RIGHT lung apex. No pleural fluid collection or pneumothorax. Degenerative change of thoracic spine.     1.  Patchy interstitial and alveolar opacities in both lungs, likely inflammatory. 2.  Focal opacity RIGHT lung apex may represent mass or focal inflammatory process. 3.  Tuberculosis is not excluded.    Dx-chest-portable (1 View)    Result Date: 11/13/2017 11/13/2017 8:14 PM HISTORY/REASON FOR EXAM:  Possible sepsis TECHNIQUE/EXAM DESCRIPTION:  Single AP view of the chest. COMPARISON: Today at 1012 FINDINGS: The cardiac silhouette appears within normal limits. Atherosclerotic calcification of the aorta is noted.  The central pulmonary vasculature appears normal. The lungs appear well expanded bilaterally.  Scattered patchy bilateral pulmonary opacities are seen,  greatest in the right apex. No significant pleural effusions are identified. The bony structures appear age-appropriate.     1.  Patchy bilateral pulmonary opacities, greatest in the right apex, stable since prior, most compatible with infiltrates. Radiographic follow-up to resolution recommended. As previously recommended exclusion of tuberculosis as clinically warranted. 2.  Atherosclerosis      Micro:  Results     Procedure Component Value Units Date/Time    ACID FAST STAIN [[id number]] Collected:  11/19/17 0830    Order Status:  Completed Specimen:  Respirate Updated:  11/19/17 2040     Significant Indicator NEG     Source RESP     Site BRONCHOALVEOLAR LAVAGE     AFB Smear Results No acid fast bacilli seen.    Narrative:       Airborne,[id number] GREEN JIMY L  Which Lobe (Bronch Only):->RUL    GRAM STAIN [[id number]] Collected:  11/19/17 0830    Order Status:  Completed Specimen:  Respirate Updated:  11/19/17 2040     Significant Indicator .     Source RESP     Site BRONCHOALVEOLAR LAVAGE     Gram Stain Result --     Moderate WBCs.  No organisms seen.      Narrative:       Airborne,[id number] GREEN JIMY L  Which Lobe (Bronch Only):->RUL    AFB [[id number]] Collected:  11/19/17 0830    Order Status:  Completed Specimen:  Respirate from Bronchoalveolar Lavage Updated:  11/19/17 2040     Significant Indicator NEG     Source RESP     Site BRONCHOALVEOLAR LAVAGE     AFB Culture Culture in progress.     AFB Smear Results No acid fast bacilli seen.    Narrative:       Airborne,[id number] GREEN JIMY L  Which Lobe (Bronch Only):->RUL    CULTURE RESPIRATORY W/ GRM STN [[id number]] Collected:  11/18/17 0949    Order Status:  Completed Specimen:  Respirate from Sputum Induced Updated:  11/19/17 1500     Significant Indicator NEG     Source RESP     Site SPUTUM INDUCED     Respiratory Culture Moderate growth usual upper respiratory janice     Gram Stain Result --     Many WBCs.  Many mixed bacteria, no predominant  "organism seen.  Few epithelial cells.  Specimen Quality Score: 2+      Narrative:       Airborne,[id number] ALMA ROSA ADHIKARI  For AFB , indiced    RESP CULTURE [[id number]] Collected:  11/19/17 0830    Order Status:  Completed Specimen:  Respirate from Bronchoalveolar Lavage Updated:  11/19/17 1009    Narrative:       Airborne,[id number] BUCKY ANDREWS  Which Lobe (Bronch Only):->RUL    BLOOD CULTURE [[id number]] Collected:  11/18/17 1927    Order Status:  Completed Specimen:  Blood from Peripheral Updated:  11/19/17 0901     Significant Indicator NEG     Source BLD     Site PERIPHERAL     Blood Culture --     No Growth    Note: Blood cultures are incubated for 5 days and  are monitored continuously.Positive blood cultures  are called to the RN and reported as soon as  they are identified.      Narrative:       Airborne,Droplet  Per Hospital Policy: Only change Specimen Src: to \"Line\" if  specified by physician order.    BLOOD CULTURE [[id number]] Collected:  11/18/17 1928    Order Status:  Completed Specimen:  Blood from Peripheral Updated:  11/19/17 0901     Significant Indicator NEG     Source BLD     Site PERIPHERAL     Blood Culture --     No Growth    Note: Blood cultures are incubated for 5 days and  are monitored continuously.Positive blood cultures  are called to the RN and reported as soon as  they are identified.      Narrative:       Airborne,Droplet  Per Hospital Policy: Only change Specimen Src: to \"Line\" if  specified by physician order.    BLOOD CULTURE [[id number]] Collected:  11/13/17 2030    Order Status:  Completed Specimen:  Blood from Peripheral Updated:  11/18/17 2300     Significant Indicator NEG     Source BLD     Site PERIPHERAL     Blood Culture No growth after 5 days of incubation.    Narrative:       Per Hospital Policy: Only change Specimen Src: to \"Line\" if  specified by physician order.    GRAM STAIN [[id number]] Collected:  11/18/17 0949    Order Status:  Completed Specimen:  " "Respirate Updated:  11/18/17 2119     Significant Indicator .     Source RESP     Site SPUTUM INDUCED     Gram Stain Result --     Many WBCs.  Many mixed bacteria, no predominant organism seen.  Few epithelial cells.  Specimen Quality Score: 2+      Narrative:       Airborne,[id number] ALMA ROSA TALLEY S  For AFB , indiced    BLOOD CULTURE [[id number]] Collected:  11/13/17 1740    Order Status:  Completed Specimen:  Blood from Peripheral Updated:  11/18/17 1900     Significant Indicator NEG     Source BLD     Site PERIPHERAL     Blood Culture No growth after 5 days of incubation.    Narrative:       Per Hospital Policy: Only change Specimen Src: to \"Line\" if  specified by physician order.    CULTURE RESPIRATORY W/ GRM STN [[id number]] Collected:  11/18/17 0949    Order Status:  Sent Specimen:  Respirate from Sputum Induced     CULTURE RESPIRATORY W/ GRM STN [[id number]] Collected:  11/18/17 0949    Order Status:  Sent Specimen:  Respirate from Sputum Induced     AFB CULTURE [[id number]] Collected:  11/16/17 0330    Order Status:  Completed Specimen:  Respirate from Sputum Induced Updated:  11/16/17 1606     Significant Indicator NEG     Source RESP     Site SPUTUM INDUCED     AFB Culture Culture in progress.     AFB Smear Results No acid fast bacilli seen.    Narrative:       Airborne,[id number] GABBY MEZA    ACID FAST STAIN [[id number]] Collected:  11/16/17 0330    Order Status:  Completed Specimen:  Respirate Updated:  11/16/17 1606     Significant Indicator NEG     Source RESP     Site SPUTUM INDUCED     AFB Smear Results No acid fast bacilli seen.    Narrative:       Airborne,[id number] GABBY MEZA    URINE CULTURE(NEW) [[id number]] Collected:  11/14/17 1054    Order Status:  Completed Specimen:  Urine from Urine, Clean Catch Updated:  11/16/17 0926     Significant Indicator NEG     Source UR     Site URINE, CLEAN CATCH     Urine Culture Mixed skin janice 10-50,000 cfu/mL    Narrative:       " Airborne,[id number] TEO JOY  Indication for culture:->Emergency Room Patient    CULTURE RESPIRATORY W/ GRM STN [[id number]] Collected:  11/14/17 2135    Order Status:  Completed Specimen:  Respirate from Sputum Induced Updated:  11/16/17 0910     Gram Stain Result --     Specimen Quality Score: 1+  Moderate WBCs.  Few Yeast.  Many mixed bacteria, no predominant organism seen.       Significant Indicator NEG     Source RESP     Site SPUTUM INDUCED     Respiratory Culture --     Moderate growth usual upper respiratory janice  including yeast.      ACID FAST STAIN [[id number]] Collected:  11/14/17 1130    Order Status:  Completed Specimen:  Respirate Updated:  11/15/17 1826     Significant Indicator NEG     Source RESP     Site Sputum (coughed)     AFB Smear Results No acid fast bacilli seen.    Narrative:       Airborne,[id number] MUHAMMAD-PÉREZ LIO B    AFB CULTURE [[id number]] Collected:  11/14/17 1130    Order Status:  Completed Specimen:  Respirate from Sputum Induced Updated:  11/15/17 1826     Significant Indicator NEG     Source RESP     Site Sputum (coughed)     AFB Culture Culture in progress.     AFB Smear Results No acid fast bacilli seen.    Narrative:       Airborne,[id number] MUHAMMAD-PÉREZ LIO B    ACID FAST STAIN [[id number]] Collected:  11/14/17 2135    Order Status:  Completed Specimen:  Respirate Updated:  11/15/17 1826     Significant Indicator NEG     Source RESP     Site SPUTUM INDUCED     AFB Smear Results No acid fast bacilli seen.    Narrative:       Airborne,Droplet    AFB CULTURE [[id number]] Collected:  11/14/17 2135    Order Status:  Completed Specimen:  Respirate from Sputum Induced Updated:  11/15/17 1825     Significant Indicator NEG     Source RESP     Site SPUTUM INDUCED     AFB Culture Culture in progress.     AFB Smear Results No acid fast bacilli seen.    Narrative:       Airborne,Droplet    GRAM STAIN [[id number]] Collected:  11/14/17 2135    Order Status:   Completed Specimen:  Respirate Updated:  11/15/17 0836     Significant Indicator .     Source RESP     Site SPUTUM INDUCED     Gram Stain Result --     Specimen Quality Score: 1+  Moderate WBCs.  Few Yeast.  Many mixed bacteria, no predominant organism seen.      CULTURE RESPIRATORY W/ GRM STN [[id number]] Collected:  11/14/17 1130    Order Status:  Canceled Specimen:  Other from Sputum Induced Updated:  11/14/17 1322    URINALYSIS [[id number]]  (Abnormal) Collected:  11/14/17 1054    Order Status:  Completed Specimen:  Urine from Urine, Clean Catch Updated:  11/14/17 1148     Color Yellow     Character Clear     Specific Gravity 1.013     Ph 5.5     Glucose Negative mg/dL      Ketones Negative mg/dL      Protein Negative mg/dL      Bilirubin Negative     Urobilinogen, Urine 0.2     Nitrite Negative     Leukocyte Esterase Small (A)     Occult Blood Small (A)     Micro Urine Req Microscopic    Narrative:       Airborne,[id number] TEO PARRA A.  Indication for culture:->Emergency Room Patient    URINALYSIS [[id number]]     Order Status:  Canceled Specimen:  Urine     URINE CULTURE(NEW) [[id number]]     Order Status:  Canceled Specimen:  Urine           Assessment:  Active Hospital Problems    Diagnosis   • Community acquired pneumonia [J18.9]   • Anemia [D64.9]   • Sepsis (CMS-HCC) [A41.9]     Plan:  Pneumonia-concern for TB, additional work  Fever resolved  + leukocytosis at last check  Work up neg so far  History, clinical course, and CXR concerning for TB   CT chest c/w TB-consolidation RUL +LAD  Appreciate pulm eval  S/p bronch on 11/19  BAL cx - neg for AFB  Continue isolation while in hosp  Sputum AFB x 3 negative  Blood cxs 11/18 neg  Continue Unasyn  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

## 2017-11-20 NOTE — PROGRESS NOTES
Received report from day shift RN. Twelve hour chart check completed. Patient assessed. Patient A and 0 X 4.   Patient states that pain is a 0 out of 10.  Patient educated on plan of care for the evening including medications per MAR, vitals, and rest. Patient educated to call for assistance. Patient verbalizes understanding.

## 2017-11-21 LAB
BACTERIA SPEC RESP CULT: ABNORMAL
GRAM STN SPEC: ABNORMAL
GRAM STN SPEC: ABNORMAL
HIV 1+2 AB+HIV1 P24 AG SERPL QL IA: NON REACTIVE
SIGNIFICANT IND 70042: ABNORMAL
SIGNIFICANT IND 70042: ABNORMAL
SITE SITE: ABNORMAL
SITE SITE: ABNORMAL
SOURCE SOURCE: ABNORMAL
SOURCE SOURCE: ABNORMAL

## 2017-11-21 PROCEDURE — 36415 COLL VENOUS BLD VENIPUNCTURE: CPT

## 2017-11-21 PROCEDURE — 700102 HCHG RX REV CODE 250 W/ 637 OVERRIDE(OP): Performed by: INTERNAL MEDICINE

## 2017-11-21 PROCEDURE — A9270 NON-COVERED ITEM OR SERVICE: HCPCS | Performed by: INTERNAL MEDICINE

## 2017-11-21 PROCEDURE — 700111 HCHG RX REV CODE 636 W/ 250 OVERRIDE (IP): Performed by: HOSPITALIST

## 2017-11-21 PROCEDURE — 700102 HCHG RX REV CODE 250 W/ 637 OVERRIDE(OP): Performed by: HOSPITALIST

## 2017-11-21 PROCEDURE — 700111 HCHG RX REV CODE 636 W/ 250 OVERRIDE (IP): Performed by: INTERNAL MEDICINE

## 2017-11-21 PROCEDURE — 700105 HCHG RX REV CODE 258: Performed by: INTERNAL MEDICINE

## 2017-11-21 PROCEDURE — 770020 HCHG ROOM/CARE - TELE (206)

## 2017-11-21 PROCEDURE — 99232 SBSQ HOSP IP/OBS MODERATE 35: CPT | Performed by: HOSPITALIST

## 2017-11-21 PROCEDURE — A9270 NON-COVERED ITEM OR SERVICE: HCPCS | Performed by: HOSPITALIST

## 2017-11-21 PROCEDURE — G0475 HIV COMBINATION ASSAY: HCPCS

## 2017-11-21 RX ADMIN — MEROPENEM 1 G: 1 INJECTION, POWDER, FOR SOLUTION INTRAVENOUS at 21:10

## 2017-11-21 RX ADMIN — MEROPENEM 1 G: 1 INJECTION, POWDER, FOR SOLUTION INTRAVENOUS at 14:11

## 2017-11-21 RX ADMIN — LORATADINE 10 MG: 10 TABLET ORAL at 08:44

## 2017-11-21 RX ADMIN — TAZOBACTAM SODIUM AND PIPERACILLIN SODIUM 4.5 G: 500; 4 INJECTION, SOLUTION INTRAVENOUS at 05:13

## 2017-11-21 RX ADMIN — FAMOTIDINE 20 MG: 20 TABLET, FILM COATED ORAL at 21:10

## 2017-11-21 RX ADMIN — FAMOTIDINE 20 MG: 20 TABLET, FILM COATED ORAL at 08:44

## 2017-11-21 RX ADMIN — SUCRALFATE 1 G: 1 TABLET ORAL at 05:13

## 2017-11-21 RX ADMIN — ENOXAPARIN SODIUM 30 MG: 100 INJECTION SUBCUTANEOUS at 08:44

## 2017-11-21 RX ADMIN — SUCRALFATE 1 G: 1 TABLET ORAL at 15:00

## 2017-11-21 RX ADMIN — SUCRALFATE 1 G: 1 TABLET ORAL at 21:10

## 2017-11-21 RX ADMIN — SUCRALFATE 1 G: 1 TABLET ORAL at 08:44

## 2017-11-21 ASSESSMENT — ENCOUNTER SYMPTOMS
VOMITING: 0
COUGH: 1
FEVER: 0
WEAKNESS: 0
HEADACHES: 0
SPUTUM PRODUCTION: 1
SHORTNESS OF BREATH: 1
DIARRHEA: 0
WEIGHT LOSS: 1
NAUSEA: 0
SHORTNESS OF BREATH: 0
ABDOMINAL PAIN: 0
DIZZINESS: 0
CHILLS: 0
BACK PAIN: 0
LOSS OF CONSCIOUSNESS: 0

## 2017-11-21 ASSESSMENT — PAIN SCALES - GENERAL: PAINLEVEL_OUTOF10: 0

## 2017-11-21 NOTE — PROGRESS NOTES
"Pulmonary Progress Note    Patient ID:   Name:             Irina Conrad     YOB: 1940  Age:                 77 y.o.  female   MRN:               [id number]                                                  Subjective:  Feeling better. No new complaints.    Interval Changes:   Sating well on RA  Pt has been suffering from pna since Sep'17.  11/20: Sating well on RA.     Objective:   Vitals/ General Appearance:   Weight/BMI: Body mass index is 23.07 kg/m².  Blood pressure 128/79, pulse 87, temperature 36.9 °C (98.4 °F), resp. rate 18, height 1.372 m (4' 6\"), weight 43.4 kg (95 lb 10.9 oz), SpO2 93 %, not currently breastfeeding.  Vitals:    11/20/17 0509 11/20/17 0900 11/20/17 1200 11/20/17 1600   BP: 140/85 114/69 124/80 128/79   Pulse: 95 96 96 87   Resp: 20 20 18 18   Temp: 37.2 °C (99 °F) 36.9 °C (98.4 °F) 36.7 °C (98.1 °F) 36.9 °C (98.4 °F)   TempSrc:       SpO2: 93% 95% 91% 93%   Weight:       Height:         Oxygen Therapy:  Pulse Oximetry: 93 %, O2 (LPM): 0, O2 Delivery: None (Room Air)    Constitutional:   Well developed, Well nourished, No acute distress  HENMT:  Normocephalic, Atraumatic, Oropharynx moist mucous membranes, No oral exudates, Nose normal.  No thyromegaly.  Eyes:  EOMI, Conjunctiva normal, No discharge.  Neck:  Normal range of motion, No cervical tenderness,  no JVD.  Cardiovascular:  Normal heart rate, Normal rhythm, No murmurs, No rubs, No gallops.   Extremitites with intact distal pulses, no cyanosis, or edema.  Lungs: Not in acute respiratory distress.  Abdomen: Bowel sounds normal, Soft, No tenderness, No guarding, No rebound, No masses, No hepatosplenomegaly.  Skin: Warm, Dry, No erythema, No rash, no induration.  Neurologic: Alert & oriented x 3, No focal deficits noted, cranial nerves II through X are grossly intact.  Psychiatric: Affect normal, Judgment normal, Mood normal.    Labs:  Recent Labs      11/18/17   0253  11/19/17   0158   WBC  14.0*  11.3*   RBC  3.30*  " 3.23*   HEMOGLOBIN  9.7*  9.8*   HEMATOCRIT  29.9*  28.9*   MCV  90.6  89.5   MCH  29.4  30.3   MCHC  32.4*  33.9   RDW  41.7  41.3   PLATELETCT  706*  594*   MPV  8.7*  8.1*                 Recent Labs      11/18/17   0253  11/19/17   0158   SODIUM  136  136   POTASSIUM  4.0  3.8   CHLORIDE  99  100   CO2  26  26   GLUCOSE  85  95   BUN  4*  6*     Recent Labs      11/18/17   0253  11/19/17   0158   SODIUM  136  136   POTASSIUM  4.0  3.8   CHLORIDE  99  100   CO2  26  26   BUN  4*  6*   CREATININE  0.59  0.62   CALCIUM  9.3  8.8     No results found for this or any previous visit.      Imaging:   DX-CHEST-LIMITED (1 VIEW)   Final Result      1.  Again seen masslike density within the right upper lobe, unchanged.      2.  Again seen patchy bilateral infiltrates.      3.  Mild cardiomegaly.      CT-CHEST (THORAX) WITH   Final Result      1.  Consolidation is identified which is prominent in the right lung apex. Findings could be due to pneumonia. Tuberculosis is a possibility.      2.  Surrounding groundglass opacification is also noted in the right upper lobe.      3.  Additional scattered multifocal opacifications are noted in each lung. Bronchiectasis is also present. Findings are likely due to infectious or inflammatory process.      4.  Mildly enlarged lymph nodes are noted in the upper mediastinum and pretracheal area.            DX-CHEST-PORTABLE (1 VIEW)   Final Result         1.  Patchy bilateral pulmonary opacities, greatest in the right apex, stable since prior, most compatible with infiltrates. Radiographic follow-up to resolution recommended. As previously recommended exclusion of tuberculosis as clinically warranted.   2.  [facility]          Hospital Medications:    Current Facility-Administered Medications:   •  benzonatate (TESSALON) capsule 100 mg, 100 mg, Oral, TID PRN, Alex Hager M.D., 100 mg at 11/20/17 0504  •  temazepam (RESTORIL) capsule 15 mg, 15 mg, Oral, QHS PRN, Alex  LILLIE Hager  •  ampicillin/sulbactam (UNASYN) 3 g in  mL IVPB, 3 g, Intravenous, Q6HRS, Leida Frye M.D., Stopped at 11/20/17 1400  •  polyethylene glycol/lytes (MIRALAX) PACKET 1 Packet, 1 Packet, Oral, DAILY, Alex Hager M.D., Stopped at 11/19/17 0900  •  famotidine (PEPCID) tablet 20 mg, 20 mg, Oral, BID, Alex Hager M.D., 20 mg at 11/20/17 0914  •  hyoscyamine-maalox plus-lidocaine viscous (GI COCKTAIL) oral susp 30 mL, 30 mL, Oral, Q6HRS PRN, Alex Hager M.D.  •  enoxaparin (LOVENOX) inj 30 mg, 30 mg, Subcutaneous, DAILY, Alex Hager M.D., 30 mg at 11/20/17 0914  •  sucralfate (CARAFATE) tablet 1 g, 1 g, Oral, Q6HRS, Jabari Ramirez M.D., 1 g at 11/20/17 1450  •  fluticasone (FLONASE) nasal spray 50 mcg, 1 Spray, Nasal, DAILY, Jabari Ramirez M.D., 50 mcg at 11/20/17 0914  •  loratadine (CLARITIN) tablet 10 mg, 10 mg, Oral, DAILY, Jabari Ramirez M.D., 10 mg at 11/20/17 0914  •  senna-docusate (PERICOLACE or SENOKOT S) 8.6-50 MG per tablet 2 Tab, 2 Tab, Oral, BID, 2 Tab at 11/20/17 0915 **AND** polyethylene glycol/lytes (MIRALAX) PACKET 1 Packet, 1 Packet, Oral, QDAY PRN, 1 Packet at 11/18/17 1008 **AND** magnesium hydroxide (MILK OF MAGNESIA) suspension 30 mL, 30 mL, Oral, QDAY PRN **AND** bisacodyl (DULCOLAX) suppository 10 mg, 10 mg, Rectal, QDAY PRN, Jabari Ramirez M.D.  •  Respiratory Care per Protocol, , Nebulization, Continuous RT, Jabari Ramirez M.D.  •  NS (BOLUS) infusion 500 mL, 500 mL, Intravenous, Once PRN, Jabari Ramirez M.D.  •  acetaminophen (TYLENOL) tablet 650 mg, 650 mg, Oral, Q6HRS PRN, Jabari Ramirez M.D., 650 mg at 11/18/17 1600  •  labetalol (NORMODYNE,TRANDATE) injection 10 mg, 10 mg, Intravenous, Q4HRS PRN, Jabari Ramirez M.D.  •  ondansetron (ZOFRAN) syringe/vial injection 4 mg, 4 mg, Intravenous, Q4HRS PRN, Jabari Ramirez M.D.  •  ondansetron (ZOFRAN ODT) dispertab 4 mg, 4 mg, Oral, Q4HRS PRN, Jabari Ramirez M.D.    Current  Outpatient Medications:  Prescriptions Prior to Admission   Medication Sig Dispense Refill Last Dose   • doxycycline (VIBRAMYCIN) 100 MG Tab Take 1 Tab by mouth 2 times a day for 10 days. 20 Tab 0 11/13/2017 at 0800   • fluticasone (FLONASE) 50 MCG/ACT nasal spray Spray 1 Spray in nose every day. 16 g 2 11/13/2017 at 0800   • loratadine (CLARITIN) 10 MG Tab Take 1 Tab by mouth every day. 30 Tab 0 11/13/2017 at 0800       Medication Allergy:  No Known Allergies    Assessment and Plan:  Active Problems:    Community acquired pneumonia POA: Yes    Sepsis (CMS-HCC) POA: Yes    Anemia POA: Yes    Plan:  Cont current abx  Will f/u on pathology report.  Will f/u with CXR tomorrow.

## 2017-11-21 NOTE — PROGRESS NOTES
Assumed care report received. Assessment completed. AOx4. Pt resting in bed, denies any pain at this time. No other complaints at this time. Plan of care discussed, verbalized understanding. Fall precautions in place. Call light within reach. Tele monitor in place. White board updated.

## 2017-11-21 NOTE — PROGRESS NOTES
Infectious Disease Progress Note    Author: Nika Rangel M.D. Date & Time of service: 2017  11:53 AM    Chief Complaint:  FU PNA    Interval History:   77 y.o. [place] woman admitted 2017 for pneumonia   AF (99.9) WBC 13.2 minimal cough-denies SE abx   Increased temp 101.5, WBC 14 plan for bronch in am   AF WBC 11.3 cough with clear sputum post-bronch   AF, no CBC, feels better, denies any weakness, some phlegm and improved cough, poor understanding of illness   AF, no CBC, denies any complaints, breathing comfortably on room air, BAL cx +PSAR   Labs Reviewed, Medications Reviewed and Radiology Reviewed.    Review of Systems:  Review of Systems   Constitutional: Positive for weight loss. Negative for chills and fever.   Respiratory: Positive for sputum production. Negative for shortness of breath.    Gastrointestinal: Negative for abdominal pain, diarrhea, nausea and vomiting.   Neurological: Negative for weakness.       Hemodynamics:  Temp (24hrs), Av.9 °C (98.5 °F), Min:36.7 °C (98 °F), Max:37.2 °C (98.9 °F)  Temperature: 37.1 °C (98.7 °F)  Pulse  Av.3  Min: 75  Max: 134   Blood Pressure : 116/71       Physical Exam:  Physical Exam   Constitutional: She is oriented to person, place, and time. She appears well-developed. No distress.   HENT:   Head: Normocephalic and atraumatic.   Eyes: EOM are normal. Pupils are equal, round, and reactive to light.   Cardiovascular: Normal rate and regular rhythm.    Pulmonary/Chest: Effort normal. No respiratory distress. She has no wheezes. She has rales.   Abdominal: Soft. She exhibits no distension. There is no tenderness.   Musculoskeletal: She exhibits edema.   Neurological: She is alert and oriented to person, place, and time.   Skin: No rash noted.   Nursing note and vitals reviewed.      Meds:    Current Facility-Administered Medications:   •  meropenem (MERREM) IV  •  benzonatate  •  temazepam  •  polyethylene  glycol/lytes  •  famotidine  •  hyoscyamine-maalox plus-lidocaine viscous  •  enoxaparin (LOVENOX) injection  •  sucralfate  •  fluticasone  •  loratadine  •  senna-docusate **AND** polyethylene glycol/lytes **AND** magnesium hydroxide **AND** bisacodyl  •  Respiratory Care per Protocol  •  NS  •  acetaminophen  •  labetalol  •  ondansetron  •  ondansetron    Labs:  Recent Labs      11/19/17 0158   WBC  11.3*   RBC  3.23*   HEMOGLOBIN  9.8*   HEMATOCRIT  28.9*   MCV  89.5   MCH  30.3   RDW  41.3   PLATELETCT  594*   MPV  8.1*   NEUTSPOLYS  70.10   LYMPHOCYTES  16.00*   MONOCYTES  6.90   EOSINOPHILS  4.80   BASOPHILS  0.40     Recent Labs      11/19/17 0158   SODIUM  136   POTASSIUM  3.8   CHLORIDE  100   CO2  26   GLUCOSE  95   BUN  6*     Recent Labs      11/19/17 0158   CREATININE  0.62       Imaging:  Ct-chest (thorax) With    Result Date: 11/16/2017 11/16/2017 6:55 PM HISTORY/REASON FOR EXAM:  Shortness of breath fever cough with history of pneumonia TECHNIQUE/EXAM DESCRIPTION: CT scan of the chest with contrast. Thin-section helical images were obtained from the lung apices through the adrenal glands following the bolus administration of 80ml mL of Optiray 350 nonionic contrast. Low dose optimization technique was utilized for this CT exam including automated exposure control and adjustment of the mA and/or kV according to patient size. COMPARISON:  None FINDINGS: Extensive consolidation is noted in the right upper lobe extending to the right lung apex. Surrounding areas of groundglass opacification are noted in the right upper lobe. Additional scattered multifocal irregular shaped and nodular opacifications with ill-defined borders are noted in both lungs. Bronchiectasis is also present bilaterally. No pleural effusions are identified. There is no pneumothorax. Mild upper mediastinal and pretracheal adenopathy is noted. There is no evidence of pericardial effusion. No large masses are seen within the  upper abdomen.     1.  Consolidation is identified which is prominent in the right lung apex. Findings could be due to pneumonia. Tuberculosis is a possibility. 2.  Surrounding groundglass opacification is also noted in the right upper lobe. 3.  Additional scattered multifocal opacifications are noted in each lung. Bronchiectasis is also present. Findings are likely due to infectious or inflammatory process. 4.  Mildly enlarged lymph nodes are noted in the upper mediastinum and pretracheal area.     Dx-chest-2 Views    Result Date: 11/11/2017 11/11/2017 10:22 AM HISTORY/REASON FOR EXAM:  Shortness of Breath Cough, intermittent fever TECHNIQUE/EXAM DESCRIPTION AND NUMBER OF VIEWS: Two views of the chest. COMPARISON:  None available. FINDINGS: Cardiac contour is mildly prominent. Patchy parenchymal and interstitial opacities in both lungs. Focal opacity at the RIGHT lung apex. No pleural fluid collection or pneumothorax. Degenerative change of thoracic spine.     1.  Patchy interstitial and alveolar opacities in both lungs, likely inflammatory. 2.  Focal opacity RIGHT lung apex may represent mass or focal inflammatory process. 3.  Tuberculosis is not excluded.    Dx-chest-portable (1 View)    Result Date: 11/13/2017 11/13/2017 8:14 PM HISTORY/REASON FOR EXAM:  Possible sepsis TECHNIQUE/EXAM DESCRIPTION:  Single AP view of the chest. COMPARISON: Today at 1012 FINDINGS: The cardiac silhouette appears within normal limits. Atherosclerotic calcification of the aorta is noted.  The central pulmonary vasculature appears normal. The lungs appear well expanded bilaterally.  Scattered patchy bilateral pulmonary opacities are seen, greatest in the right apex. No significant pleural effusions are identified. The bony structures appear age-appropriate.     1.  Patchy bilateral pulmonary opacities, greatest in the right apex, stable since prior, most compatible with infiltrates. Radiographic follow-up to resolution  recommended. As previously recommended exclusion of tuberculosis as clinically warranted. 2.  Atherosclerosis      Micro:  Results     Procedure Component Value Units Date/Time    AFB [[id number]] Collected:  11/19/17 0830    Order Status:  Completed Specimen:  Respirate from Bronchoalveolar Lavage Updated:  11/21/17 0841     Significant Indicator NEG     Source RESP     Site BRONCHOALVEOLAR LAVAGE     AFB Culture Culture in progress.     AFB Smear Results No acid fast bacilli seen.    Narrative:       Airborne,[id number] GREEN JIMY L  Which Lobe (Bronch Only):->RUL    RESP CULTURE [[id number]]  (Abnormal) Collected:  11/19/17 0830    Order Status:  Completed Specimen:  Respirate from Bronchoalveolar Lavage Updated:  11/21/17 0841     Gram Stain Result --     Moderate WBCs.  No organisms seen.       Significant Indicator POS (POS)     Source RESP     Site BRONCHOALVEOLAR LAVAGE     Respiratory Culture Rare growth usual upper respiratory janice (A)     Respiratory Culture -- (A)     Pseudomonas aeruginosa  Light growth  See previous culture for sensitivity report.  P.aeruginosa may develop resistance during prolonged therapy  with all antibiotics. Isolates that are initially susceptible  may become resistant within three to four days after  initiation of therapy. Testing of repeat isolates may be  warranted.      Narrative:       Airborne,[id number] GREEN JIMY L  Which Lobe (Bronch Only):->RUL    CULTURE RESPIRATORY W/ GRM STN [[id number]]  (Abnormal)  (Susceptibility) Collected:  11/18/17 0949    Order Status:  Completed Specimen:  Respirate from Sputum Induced Updated:  11/21/17 0828     Gram Stain Result --     Many WBCs.  Many mixed bacteria, no predominant organism seen.  Few epithelial cells.  Specimen Quality Score: 2+       Significant Indicator POS (POS)     Source RESP     Site SPUTUM INDUCED     Respiratory Culture -- (A)     Moderate growth usual upper respiratory janice  , including  yeast.        Respiratory Culture -- (A)     Pseudomonas aeruginosa  Light growth  The Piperacillin ISA is >16 ug/mL, use Piperacillin 4 Gm q6h  plus synergistic therapy with Tobramycin.  P.aeruginosa may develop resistance during prolonged therapy  with all antibiotics. Isolates that are initially susceptible  may become resistant within three to four days after  initiation of therapy. Testing of repeat isolates may be  warranted.      Narrative:       Airborne,[id number] ALMA ROSA ADHIKARI  For AFB , indiced    Culture & Susceptibility     PSEUDOMONAS AERUGINOSA     Antibiotic Sensitivity Microscan Unit Status    Amikacin Sensitive <=16 mcg/mL Final    Cefepime Resistant >16 mcg/mL Final    Ceftazidime Resistant >16 mcg/mL Final    Ciprofloxacin Sensitive <=1 mcg/mL Final    Gentamicin Sensitive <=4 mcg/mL Final    Imipenem Sensitive <=1 mcg/mL Final    Meropenem Sensitive <=1 mcg/mL Final    Pip/Tazobactam Resistant >64 mcg/mL Final    Tobramycin Sensitive <=4 mcg/mL Final                       ACID FAST STAIN [[id number]] Collected:  11/19/17 0830    Order Status:  Completed Specimen:  Respirate Updated:  11/19/17 2040     Significant Indicator NEG     Source RESP     Site BRONCHOALVEOLAR LAVAGE     AFB Smear Results No acid fast bacilli seen.    Narrative:       Airborne,[id number] GREEN JIMY L  Which Lobe (Bronch Only):->RUL    GRAM STAIN [[id number]] Collected:  11/19/17 0830    Order Status:  Completed Specimen:  Respirate Updated:  11/19/17 2040     Significant Indicator .     Source RESP     Site BRONCHOALVEOLAR LAVAGE     Gram Stain Result --     Moderate WBCs.  No organisms seen.      Narrative:       Airborne,[id number] GREEN JIMY L  Which Lobe (Bronch Only):->RUL    BLOOD CULTURE [[id number]] Collected:  11/18/17 1927    Order Status:  Completed Specimen:  Blood from Peripheral Updated:  11/19/17 0901     Significant Indicator NEG     Source BLD     Site PERIPHERAL     Blood Culture --     No Growth    Note:  "Blood cultures are incubated for 5 days and  are monitored continuously.Positive blood cultures  are called to the RN and reported as soon as  they are identified.      Narrative:       Airborne,Droplet  Per Hospital Policy: Only change Specimen Src: to \"Line\" if  specified by physician order.    BLOOD CULTURE [[id number]] Collected:  11/18/17 1928    Order Status:  Completed Specimen:  Blood from Peripheral Updated:  11/19/17 0901     Significant Indicator NEG     Source BLD     Site PERIPHERAL     Blood Culture --     No Growth    Note: Blood cultures are incubated for 5 days and  are monitored continuously.Positive blood cultures  are called to the RN and reported as soon as  they are identified.      Narrative:       Airborne,Droplet  Per Hospital Policy: Only change Specimen Src: to \"Line\" if  specified by physician order.    BLOOD CULTURE [[id number]] Collected:  11/13/17 2030    Order Status:  Completed Specimen:  Blood from Peripheral Updated:  11/18/17 2300     Significant Indicator NEG     Source BLD     Site PERIPHERAL     Blood Culture No growth after 5 days of incubation.    Narrative:       Per Hospital Policy: Only change Specimen Src: to \"Line\" if  specified by physician order.    GRAM STAIN [[id number]] Collected:  11/18/17 0949    Order Status:  Completed Specimen:  Respirate Updated:  11/18/17 2119     Significant Indicator .     Source RESP     Site SPUTUM INDUCED     Gram Stain Result --     Many WBCs.  Many mixed bacteria, no predominant organism seen.  Few epithelial cells.  Specimen Quality Score: 2+      Narrative:       Airborne,[id number] ALMA ROSA ADHIKARI  For AFB , indiced    BLOOD CULTURE [[id number]] Collected:  11/13/17 1740    Order Status:  Completed Specimen:  Blood from Peripheral Updated:  11/18/17 1900     Significant Indicator NEG     Source BLD     Site PERIPHERAL     Blood Culture No growth after 5 days of incubation.    Narrative:       Per Hospital Policy: Only change " "Specimen Src: to \"Line\" if  specified by physician order.    CULTURE RESPIRATORY W/ GRM STN [[id number]] Collected:  11/18/17 0949    Order Status:  Sent Specimen:  Respirate from Sputum Induced     CULTURE RESPIRATORY W/ GRM STN [[id number]] Collected:  11/18/17 0949    Order Status:  Sent Specimen:  Respirate from Sputum Induced     AFB CULTURE [[id number]] Collected:  11/16/17 0330    Order Status:  Completed Specimen:  Respirate from Sputum Induced Updated:  11/16/17 1606     Significant Indicator NEG     Source RESP     Site SPUTUM INDUCED     AFB Culture Culture in progress.     AFB Smear Results No acid fast bacilli seen.    Narrative:       Airborne,[id number] GABBY MEZA    ACID FAST STAIN [[id number]] Collected:  11/16/17 0330    Order Status:  Completed Specimen:  Respirate Updated:  11/16/17 1606     Significant Indicator NEG     Source RESP     Site SPUTUM INDUCED     AFB Smear Results No acid fast bacilli seen.    Narrative:       Airborne,[id number] GABBY MEZA    URINE CULTURE(NEW) [[id number]] Collected:  11/14/17 1054    Order Status:  Completed Specimen:  Urine from Urine, Clean Catch Updated:  11/16/17 0926     Significant Indicator NEG     Source UR     Site URINE, CLEAN CATCH     Urine Culture Mixed skin janice 10-50,000 cfu/mL    Narrative:       Airborne,[id number] TEO JOY  Indication for culture:->Emergency Room Patient    CULTURE RESPIRATORY W/ GRM STN [[id number]] Collected:  11/14/17 2135    Order Status:  Completed Specimen:  Respirate from Sputum Induced Updated:  11/16/17 0910     Gram Stain Result --     Specimen Quality Score: 1+  Moderate WBCs.  Few Yeast.  Many mixed bacteria, no predominant organism seen.       Significant Indicator NEG     Source RESP     Site SPUTUM INDUCED     Respiratory Culture --     Moderate growth usual upper respiratory janice  including yeast.      ACID FAST STAIN [[id number]] Collected:  11/14/17 1130    Order Status:  " Completed Specimen:  Respirate Updated:  11/15/17 1826     Significant Indicator NEG     Source RESP     Site Sputum (coughed)     AFB Smear Results No acid fast bacilli seen.    Narrative:       Airborne,[id number] MUHAMMAD-PÉREZ LIO B    AFB CULTURE [[id number]] Collected:  11/14/17 1130    Order Status:  Completed Specimen:  Respirate from Sputum Induced Updated:  11/15/17 1826     Significant Indicator NEG     Source RESP     Site Sputum (coughed)     AFB Culture Culture in progress.     AFB Smear Results No acid fast bacilli seen.    Narrative:       Airborne,[id number] MUHAMMAD-PÉREZ LIO B    ACID FAST STAIN [[id number]] Collected:  11/14/17 2135    Order Status:  Completed Specimen:  Respirate Updated:  11/15/17 1826     Significant Indicator NEG     Source RESP     Site SPUTUM INDUCED     AFB Smear Results No acid fast bacilli seen.    Narrative:       Airborne,Droplet    AFB CULTURE [[id number]] Collected:  11/14/17 2135    Order Status:  Completed Specimen:  Respirate from Sputum Induced Updated:  11/15/17 1825     Significant Indicator NEG     Source RESP     Site SPUTUM INDUCED     AFB Culture Culture in progress.     AFB Smear Results No acid fast bacilli seen.    Narrative:       Airborne,Droplet    GRAM STAIN [[id number]] Collected:  11/14/17 2135    Order Status:  Completed Specimen:  Respirate Updated:  11/15/17 0836     Significant Indicator .     Source RESP     Site SPUTUM INDUCED     Gram Stain Result --     Specimen Quality Score: 1+  Moderate WBCs.  Few Yeast.  Many mixed bacteria, no predominant organism seen.      CULTURE RESPIRATORY W/ GRM STN [[id number]] Collected:  11/14/17 1130    Order Status:  Canceled Specimen:  Other from Sputum Induced Updated:  11/14/17 1322          Assessment:  Active Hospital Problems    Diagnosis   • Community acquired pneumonia [J18.9]   • Anemia [D64.9]   • Sepsis (CMS-HCC) [A41.9]     Plan:  Pneumonia-concern for TB  Fever resolved  + leukocytosis at  last check  Work up neg so far  History, clinical course, and CXR concerning for TB   CT chest c/w TB-consolidation RUL +LAD  Appreciate pulm eval  S/p bronch on 11/19  BAL cx - neg for AFB  Sputum AFB x 3 negative  Blood cxs 11/18 neg  BAL cx + MDR PSAR (R-zosyn)  Transition to meropenem  Anticipate 2 week course  DC airborne isolation    Thrombocytosis  Acute phase reactant  Should resolve with treatment infection    Sepsis (CMS-HCC)- (present on admission)  Abx as above  Blood cxs previously neg  Ucx neg    Plan of care discussed with pulm

## 2017-11-21 NOTE — PROGRESS NOTES
Bedside report received. Pt admitted for dyspnea, fever, chills, malaise. R/O TB, and off precautions, this a.m. Continue Tx for PNA. ID on board, with recent change in IV ATB. Unlikely DC. AOx4, NAD. Denies CP and N&V, with intermittent SOB. Bed low and locked, call bell within reach.

## 2017-11-21 NOTE — PROGRESS NOTES
[facility] Hospitalist Progress Note    Date of Service: 2017    Chief Complaint  77 y.o. female admitted 2017 with cough and CAP.  Imaging and Hx concerning for TB    Interval Problem Update  She feels good    Afebrile    She wants to go home    We are awaiting biopsy results    Consultants/Specialty  Pulmonology  ID    Disposition  pending        Review of Systems   Constitutional: Negative for chills and fever.   Respiratory: Positive for cough, sputum production and shortness of breath.    Cardiovascular: Negative for chest pain.   Gastrointestinal: Negative for abdominal pain, diarrhea, nausea and vomiting.   Musculoskeletal: Negative for back pain.   Skin: Negative for rash.   Neurological: Negative for dizziness, loss of consciousness and headaches.      Physical Exam  Laboratory/Imaging   Hemodynamics  Temp (24hrs), Av.9 °C (98.5 °F), Min:36.7 °C (98 °F), Max:37.2 °C (98.9 °F)   Temperature: 37.1 °C (98.7 °F)  Pulse  Av.3  Min: 75  Max: 134    Blood Pressure : 116/71      Respiratory      Respiration: 16, Pulse Oximetry: 95 %     Work Of Breathing / Effort: Mild  RUL Breath Sounds: Fine Crackles, RML Breath Sounds: Fine Crackles, RLL Breath Sounds: Fine Crackles, RENEE Breath Sounds: Fine Crackles, LLL Breath Sounds: Fine Crackles    Fluids    Intake/Output Summary (Last 24 hours) at 17 1238  Last data filed at 17 2000   Gross per 24 hour   Intake              840 ml   Output                0 ml   Net              840 ml       Nutrition  Orders Placed This Encounter   Procedures   • DIET ORDER     Standing Status:   Standing     Number of Occurrences:   1     Order Specific Question:   Diet:     Answer:   Regular [1]     Physical Exam   Constitutional: She is oriented to person, place, and time. She appears well-developed and well-nourished. No distress.   HENT:   Head: Normocephalic and atraumatic.   Neck: No JVD present.   Cardiovascular: Normal rate and regular rhythm.     Pulmonary/Chest: Effort normal. No stridor. No respiratory distress. She has no wheezes. She has no rales.   Abdominal: Soft. There is no tenderness. There is no rebound and no guarding.   Musculoskeletal: She exhibits no edema.   Neurological: She is oriented to person, place, and time.   Skin: Skin is warm and dry. No rash noted. She is not diaphoretic.   Psychiatric: She has a normal mood and affect. Thought content normal.   Nursing note and vitals reviewed.      Recent Labs      11/19/17   0158   WBC  11.3*   RBC  3.23*   HEMOGLOBIN  9.8*   HEMATOCRIT  28.9*   MCV  89.5   MCH  30.3   MCHC  33.9   RDW  41.3   PLATELETCT  594*   MPV  8.1*     Recent Labs      11/19/17 0158   SODIUM  136   POTASSIUM  3.8   CHLORIDE  100   CO2  26   GLUCOSE  95   BUN  6*   CREATININE  0.62   CALCIUM  8.8                      Assessment/Plan     * Community acquired pneumonia- (present on admission)   Assessment & Plan    Unclear pathogen  Very high risk for TB given presentation-- > TB testing NEGATIVE  BAL done 11/19  ID following--> on iv merrem    Check am cbc, bmp        Anemia- (present on admission)   Assessment & Plan    Iron deficiency  From ? Uterine prolapse        Sepsis (CMS-HCC)- (present on admission)   Assessment & Plan    Mild/simple, c/w measures            Reviewed items::  Radiology images reviewed, EKG reviewed, Labs reviewed and Medications reviewed  Wilson catheter::  No Wilson  DVT prophylaxis pharmacological::  Not indicated at this time, ambulatory  DVT prophylaxis - mechanical:  SCDs  Ulcer Prophylaxis::  Not indicated  Antibiotics:  Treating active infection/contamination beyond 24 hours perioperative coverage

## 2017-11-21 NOTE — PROGRESS NOTES
"Pulmonary Progress Note    Patient ID:   Name:             Irina Conrad     YOB: 1940  Age:                 77 y.o.  female   MRN:               [id number]                                                  Subjective:  Feeling better. No new complaints.     Interval Changes:   Sating well on RA  Pt has been suffering from pna since Sep'17.  11/20: Sating well on RA.   11/21: RA sat normal. Sputum cx and BAL cx came back pos for multidrug resistant pseudomonas sp. Started on meropenem from today.    Objective:   Vitals/ General Appearance:   Weight/BMI: Body mass index is 22.96 kg/m².  Blood pressure 124/90, pulse 92, temperature 37.6 °C (99.7 °F), resp. rate 16, height 1.372 m (4' 6\"), weight 43.2 kg (95 lb 3.8 oz), SpO2 97 %, not currently breastfeeding.  Vitals:    11/20/17 2301 11/21/17 0310 11/21/17 0801 11/21/17 1325   BP: 144/79 137/88 116/71 124/90   Pulse: 82 79 82 92   Resp: 16 18 16 16   Temp: 37.2 °C (98.9 °F) 36.7 °C (98 °F) 37.1 °C (98.7 °F) 37.6 °C (99.7 °F)   TempSrc:       SpO2: 94% 90% 95% 97%   Weight:       Height:         Oxygen Therapy:  Pulse Oximetry: 97 %, O2 (LPM): 0, O2 Delivery: None (Room Air)    Constitutional:   Well developed, Well nourished, No acute distress  HENMT:  Normocephalic, Atraumatic, Oropharynx moist mucous membranes, No oral exudates, Nose normal.  No thyromegaly.  Eyes:  EOMI, Conjunctiva normal, No discharge.  Neck:  Normal range of motion, No cervical tenderness,  no JVD.  Cardiovascular:  Normal heart rate, Normal rhythm, No murmurs, No rubs, No gallops.   Extremitites with intact distal pulses, no cyanosis, or edema.  Lungs: Not in acute respiratory distress. Mild rales in the R upper lung with scattered wheeze noted.  Abdomen: Bowel sounds normal, Soft, No tenderness, No guarding, No rebound, No masses, No hepatosplenomegaly.  Skin: Warm, Dry, No erythema, No rash, no induration.  Neurologic: Alert & oriented x 3, No focal deficits noted, cranial " nerves II through X are grossly intact.  Psychiatric: Affect normal, Judgment normal, Mood normal.    Labs:  Recent Labs      11/19/17   0158   WBC  11.3*   RBC  3.23*   HEMOGLOBIN  9.8*   HEMATOCRIT  28.9*   MCV  89.5   MCH  30.3   MCHC  33.9   RDW  41.3   PLATELETCT  594*   MPV  8.1*                 Recent Labs      11/19/17   0158   SODIUM  136   POTASSIUM  3.8   CHLORIDE  100   CO2  26   GLUCOSE  95   BUN  6*     Recent Labs      11/19/17   0158   SODIUM  136   POTASSIUM  3.8   CHLORIDE  100   CO2  26   BUN  6*   CREATININE  0.62   CALCIUM  8.8     No results found for this or any previous visit.      Imaging:   DX-CHEST-LIMITED (1 VIEW)   Final Result      1.  Again seen masslike density within the right upper lobe, unchanged.      2.  Again seen patchy bilateral infiltrates.      3.  Mild cardiomegaly.      CT-CHEST (THORAX) WITH   Final Result      1.  Consolidation is identified which is prominent in the right lung apex. Findings could be due to pneumonia. Tuberculosis is a possibility.      2.  Surrounding groundglass opacification is also noted in the right upper lobe.      3.  Additional scattered multifocal opacifications are noted in each lung. Bronchiectasis is also present. Findings are likely due to infectious or inflammatory process.      4.  Mildly enlarged lymph nodes are noted in the upper mediastinum and pretracheal area.            DX-CHEST-PORTABLE (1 VIEW)   Final Result         1.  Patchy bilateral pulmonary opacities, greatest in the right apex, stable since prior, most compatible with infiltrates. Radiographic follow-up to resolution recommended. As previously recommended exclusion of tuberculosis as clinically warranted.   2.  [facility]          Hospital Medications:    Current Facility-Administered Medications:   •  meropenem (MERREM) 1 g in  mL IVPB, 1,000 mg, Intravenous, Q8HRS, Matt Mendoza M.D., Stopped at 11/21/17 1441  •  benzonatate (TESSALON) capsule 100 mg, 100 mg,  Oral, TID PRN, Alex Hgaer M.D., 100 mg at 11/20/17 0504  •  temazepam (RESTORIL) capsule 15 mg, 15 mg, Oral, QHS PRN, Alex Hager M.D.  •  polyethylene glycol/lytes (MIRALAX) PACKET 1 Packet, 1 Packet, Oral, DAILY, Alex Hager M.D., Stopped at 11/19/17 0900  •  famotidine (PEPCID) tablet 20 mg, 20 mg, Oral, BID, Alex Hager M.D., 20 mg at 11/21/17 0844  •  hyoscyamine-maalox plus-lidocaine viscous (GI COCKTAIL) oral susp 30 mL, 30 mL, Oral, Q6HRS PRN, Alex Hager M.D.  •  enoxaparin (LOVENOX) inj 30 mg, 30 mg, Subcutaneous, DAILY, Alex Hager M.D., 30 mg at 11/21/17 0844  •  sucralfate (CARAFATE) tablet 1 g, 1 g, Oral, Q6HRS, Jabari Ramirez M.D., 1 g at 11/21/17 0844  •  fluticasone (FLONASE) nasal spray 50 mcg, 1 Spray, Nasal, DAILY, Jabari Ramirez M.D., 50 mcg at 11/20/17 0914  •  loratadine (CLARITIN) tablet 10 mg, 10 mg, Oral, DAILY, Jabari Ramirez M.D., 10 mg at 11/21/17 0844  •  senna-docusate (PERICOLACE or SENOKOT S) 8.6-50 MG per tablet 2 Tab, 2 Tab, Oral, BID, Stopped at 11/21/17 0900 **AND** polyethylene glycol/lytes (MIRALAX) PACKET 1 Packet, 1 Packet, Oral, QDAY PRN, 1 Packet at 11/18/17 1008 **AND** magnesium hydroxide (MILK OF MAGNESIA) suspension 30 mL, 30 mL, Oral, QDAY PRN **AND** bisacodyl (DULCOLAX) suppository 10 mg, 10 mg, Rectal, QDAY PRN, Jabari Ramirez M.D.  •  Respiratory Care per Protocol, , Nebulization, Continuous RT, Jabari Ramirez M.D.  •  NS (BOLUS) infusion 500 mL, 500 mL, Intravenous, Once PRN, Jabari Ramirez M.D.  •  acetaminophen (TYLENOL) tablet 650 mg, 650 mg, Oral, Q6HRS PRN, Jabari Ramirez M.D., 650 mg at 11/18/17 1600  •  labetalol (NORMODYNE,TRANDATE) injection 10 mg, 10 mg, Intravenous, Q4HRS PRN, Jabari Ramirez M.D.  •  ondansetron (ZOFRAN) syringe/vial injection 4 mg, 4 mg, Intravenous, Q4HRS PRN, Jabari Ramirez M.D.  •  ondansetron (ZOFRAN ODT) dispertab 4 mg, 4 mg, Oral, Q4HRS PRN, Jabari Ramirez M.D.    Current  Outpatient Medications:  Prescriptions Prior to Admission   Medication Sig Dispense Refill Last Dose   • doxycycline (VIBRAMYCIN) 100 MG Tab Take 1 Tab by mouth 2 times a day for 10 days. 20 Tab 0 11/13/2017 at 0800   • fluticasone (FLONASE) 50 MCG/ACT nasal spray Spray 1 Spray in nose every day. 16 g 2 11/13/2017 at 0800   • loratadine (CLARITIN) 10 MG Tab Take 1 Tab by mouth every day. 30 Tab 0 11/13/2017 at 0800       Medication Allergy:  No Known Allergies    Assessment and Plan:  Active Problems:    Community acquired pneumonia POA: Yes    Sepsis (CMS-[facility]) POA: Yes    Anemia POA: Yes    Sp Cx positive MDR Pseudomonas sp.    Plan:  Started on meropenem   Cont supportive tm.  Will cont to follow  Cont supportive care.

## 2017-11-21 NOTE — PROGRESS NOTES
Sitting up in bed watching tv. Discussed plan of care with pt. And her daughter on the phone. Questions answered. Pt. States she feels better and really wants to go home.

## 2017-11-21 NOTE — CARE PLAN
Problem: Safety  Goal: Will remain free from injury  Outcome: PROGRESSING AS EXPECTED  Pt ambulating with steady gait no complaints of any difficulty. Pt educated to notify staff of any changes in feeling and if she needs any help. Pt verbalized understanding. New treaded socks provided to pt. Bed in low and locked position. Call light within reach.    Problem: Knowledge Deficit  Goal: Knowledge of disease process/condition, treatment plan, diagnostic tests, and medications will improve  Outcome: PROGRESSING AS EXPECTED  Discussed POC with pt. Answered all questions appropriately. White board updated. All medications and interventions explained before performed. Pt verbalized understanding.

## 2017-11-21 NOTE — PROGRESS NOTES
Vicky Chen Fall Risk Assessment:     Last Known Fall: Within the last six months  Mobility: No limitations  Medications: Cardiovascular or central nervous system meds  Mental Status/LOC/Awareness: Awake, alert, and oriented to date, place, and person  Toileting Needs: No needs  Volume/Electrolyte Status: No problems  Communication/Sensory: Visual (Glasses)/hearing deficit  Behavior: Appropriate behavior  Vicky Chen Fall Risk Total: 7  Fall Risk Level: LOW RISK    Universal Fall Precautions:  call light/belongings in reach, bed in low position and locked, wheelchairs and assistive devices out of sight, siderails up x 2, use non-slip footwear, adequate lighting, clutter free and spill free environment, educate on level of risk, educate to call for assistance    Fall Risk Level Interventions:   TRIAL ([employer], NEURO, MED EMIL 5) Low Fall Risk Interventions  Place yellow fall risk ID band on patient: verified  Provide patient/family education based on risk assessment: verified  Educate patient/family to call staff for assistance when getting out of bed: verified  Place fall precaution signage outside patient door: verifiedTRIAL ([employer], NEURO, MED EMIL 5) Moderate Fall Risk Interventions  Place yellow fall risk ID band on patient: verified  Provide patient/family education based on risk assessment : verified  Educate patient/family to call staff for assistance when getting out of bed: verified  Place fall precaution signage outside patient door: verified  Utilize bed/chair fall alarm: verified     Patient Specific Interventions:     Medication: review medications with patient and family  Mental Status/LOC/Awareness: reinforce the use of call light  Toileting: provide frquent toileting  Volume/Electrolyte Status: ensure patient remains hydrated  Communication/Sensory: update plan of care on whiteboard  Behavioral: instruct/reinforce fall program rationale  Mobility: ensure bed is locked and in lowest position

## 2017-11-21 NOTE — CARE PLAN
Problem: Knowledge Deficit  Goal: Knowledge of disease process/condition, treatment plan, diagnostic tests, and medications will improve  Outcome: PROGRESSING AS EXPECTED  Plan of care discussed and questions answered.    Problem: Respiratory:  Goal: Respiratory status will improve    Intervention: Assess and monitor pulmonary status   11/20/17 2001   OTHER   Respiratory Pattern No Dyspnea   Work Of Breathing / Effort Mild   RUL Breath Sounds Clear   RML Breath Sounds Clear   RLL Breath Sounds Diminished   LLL Breath Sounds Diminished   Respiratory   Cough Dry

## 2017-11-22 LAB
ANION GAP SERPL CALC-SCNC: 8 MMOL/L (ref 0–11.9)
BASOPHILS # BLD AUTO: 0 % (ref 0–1.8)
BASOPHILS # BLD: 0 K/UL (ref 0–0.12)
BUN SERPL-MCNC: 10 MG/DL (ref 8–22)
CALCIUM SERPL-MCNC: 9.3 MG/DL (ref 8.5–10.5)
CHLORIDE SERPL-SCNC: 103 MMOL/L (ref 96–112)
CO2 SERPL-SCNC: 27 MMOL/L (ref 20–33)
CREAT SERPL-MCNC: 0.72 MG/DL (ref 0.5–1.4)
EKG IMPRESSION: NORMAL
EOSINOPHIL # BLD AUTO: 0.33 K/UL (ref 0–0.51)
EOSINOPHIL NFR BLD: 2.6 % (ref 0–6.9)
ERYTHROCYTE [DISTWIDTH] IN BLOOD BY AUTOMATED COUNT: 43.4 FL (ref 35.9–50)
GFR SERPL CREATININE-BSD FRML MDRD: >60 ML/MIN/1.73 M 2
GLUCOSE SERPL-MCNC: 88 MG/DL (ref 65–99)
HCT VFR BLD AUTO: 33.4 % (ref 37–47)
HGB BLD-MCNC: 10.6 G/DL (ref 12–16)
LYMPHOCYTES # BLD AUTO: 1.99 K/UL (ref 1–4.8)
LYMPHOCYTES NFR BLD: 15.8 % (ref 22–41)
MANUAL DIFF BLD: NORMAL
MCH RBC QN AUTO: 29.3 PG (ref 27–33)
MCHC RBC AUTO-ENTMCNC: 31.7 G/DL (ref 33.6–35)
MCV RBC AUTO: 92.3 FL (ref 81.4–97.8)
METAMYELOCYTES NFR BLD MANUAL: 1.7 %
MONOCYTES # BLD AUTO: 0.67 K/UL (ref 0–0.85)
MONOCYTES NFR BLD AUTO: 5.3 % (ref 0–13.4)
MORPHOLOGY BLD-IMP: NORMAL
NEUTROPHILS # BLD AUTO: 9.4 K/UL (ref 2–7.15)
NEUTROPHILS NFR BLD: 74.6 % (ref 44–72)
NRBC # BLD AUTO: 0 K/UL
NRBC BLD AUTO-RTO: 0 /100 WBC
PLATELET # BLD AUTO: 717 K/UL (ref 164–446)
PLATELET BLD QL SMEAR: NORMAL
PMV BLD AUTO: 8.3 FL (ref 9–12.9)
POTASSIUM SERPL-SCNC: 3.8 MMOL/L (ref 3.6–5.5)
RBC # BLD AUTO: 3.62 M/UL (ref 4.2–5.4)
RBC BLD AUTO: NORMAL
SODIUM SERPL-SCNC: 138 MMOL/L (ref 135–145)
WBC # BLD AUTO: 12.6 K/UL (ref 4.8–10.8)

## 2017-11-22 PROCEDURE — 700105 HCHG RX REV CODE 258: Performed by: INTERNAL MEDICINE

## 2017-11-22 PROCEDURE — 85027 COMPLETE CBC AUTOMATED: CPT

## 2017-11-22 PROCEDURE — 85007 BL SMEAR W/DIFF WBC COUNT: CPT

## 2017-11-22 PROCEDURE — 700102 HCHG RX REV CODE 250 W/ 637 OVERRIDE(OP): Performed by: HOSPITALIST

## 2017-11-22 PROCEDURE — 700111 HCHG RX REV CODE 636 W/ 250 OVERRIDE (IP): Performed by: INTERNAL MEDICINE

## 2017-11-22 PROCEDURE — 700102 HCHG RX REV CODE 250 W/ 637 OVERRIDE(OP): Performed by: INTERNAL MEDICINE

## 2017-11-22 PROCEDURE — 93005 ELECTROCARDIOGRAM TRACING: CPT | Performed by: INTERNAL MEDICINE

## 2017-11-22 PROCEDURE — 36415 COLL VENOUS BLD VENIPUNCTURE: CPT

## 2017-11-22 PROCEDURE — 700111 HCHG RX REV CODE 636 W/ 250 OVERRIDE (IP): Performed by: HOSPITALIST

## 2017-11-22 PROCEDURE — 770020 HCHG ROOM/CARE - TELE (206)

## 2017-11-22 PROCEDURE — A9270 NON-COVERED ITEM OR SERVICE: HCPCS | Performed by: HOSPITALIST

## 2017-11-22 PROCEDURE — A9270 NON-COVERED ITEM OR SERVICE: HCPCS | Performed by: INTERNAL MEDICINE

## 2017-11-22 PROCEDURE — 80048 BASIC METABOLIC PNL TOTAL CA: CPT

## 2017-11-22 PROCEDURE — 99231 SBSQ HOSP IP/OBS SF/LOW 25: CPT | Performed by: HOSPITALIST

## 2017-11-22 PROCEDURE — 93010 ELECTROCARDIOGRAM REPORT: CPT | Performed by: INTERNAL MEDICINE

## 2017-11-22 RX ORDER — FERROUS SULFATE 325(65) MG
325 TABLET ORAL 2 TIMES DAILY WITH MEALS
Status: DISCONTINUED | OUTPATIENT
Start: 2017-11-22 | End: 2017-11-24 | Stop reason: HOSPADM

## 2017-11-22 RX ADMIN — Medication 325 MG: at 17:00

## 2017-11-22 RX ADMIN — LORATADINE 10 MG: 10 TABLET ORAL at 07:43

## 2017-11-22 RX ADMIN — FAMOTIDINE 20 MG: 20 TABLET, FILM COATED ORAL at 07:43

## 2017-11-22 RX ADMIN — POLYETHYLENE GLYCOL 3350 1 PACKET: 17 POWDER, FOR SOLUTION ORAL at 07:43

## 2017-11-22 RX ADMIN — STANDARDIZED SENNA CONCENTRATE AND DOCUSATE SODIUM 2 TABLET: 8.6; 5 TABLET, FILM COATED ORAL at 07:43

## 2017-11-22 RX ADMIN — MEROPENEM 1 G: 1 INJECTION, POWDER, FOR SOLUTION INTRAVENOUS at 21:53

## 2017-11-22 RX ADMIN — MEROPENEM 1 G: 1 INJECTION, POWDER, FOR SOLUTION INTRAVENOUS at 14:19

## 2017-11-22 RX ADMIN — SUCRALFATE 1 G: 1 TABLET ORAL at 05:11

## 2017-11-22 RX ADMIN — SUCRALFATE 1 G: 1 TABLET ORAL at 11:33

## 2017-11-22 RX ADMIN — SUCRALFATE 1 G: 1 TABLET ORAL at 01:10

## 2017-11-22 RX ADMIN — SUCRALFATE 1 G: 1 TABLET ORAL at 17:00

## 2017-11-22 RX ADMIN — ENOXAPARIN SODIUM 30 MG: 100 INJECTION SUBCUTANEOUS at 07:43

## 2017-11-22 RX ADMIN — MEROPENEM 1 G: 1 INJECTION, POWDER, FOR SOLUTION INTRAVENOUS at 05:11

## 2017-11-22 RX ADMIN — FLUTICASONE PROPIONATE 50 MCG: 50 SPRAY, METERED NASAL at 07:44

## 2017-11-22 RX ADMIN — FAMOTIDINE 20 MG: 20 TABLET, FILM COATED ORAL at 21:53

## 2017-11-22 ASSESSMENT — ENCOUNTER SYMPTOMS
NAUSEA: 0
SPUTUM PRODUCTION: 1
ABDOMINAL PAIN: 0
FEVER: 0
HEADACHES: 0
VOMITING: 0
DIARRHEA: 0
WEIGHT LOSS: 1
WEAKNESS: 0
COUGH: 1
BACK PAIN: 0
CHILLS: 0
DIZZINESS: 0
LOSS OF CONSCIOUSNESS: 0
SHORTNESS OF BREATH: 0
SHORTNESS OF BREATH: 1

## 2017-11-22 ASSESSMENT — PAIN SCALES - GENERAL: PAINLEVEL_OUTOF10: 0

## 2017-11-22 NOTE — PROGRESS NOTES
Received awake this am during report, A/O x4, ambulated to the restroom byherself ,steady gait.  AM Assessment completed see Doc flow sheet for reference.

## 2017-11-22 NOTE — CARE PLAN
Problem: Bowel/Gastric:  Goal: Normal bowel function is maintained or improved  Outcome: PROGRESSING AS EXPECTED  Pt had BM this a.m.    Problem: Respiratory:  Goal: Respiratory status will improve    Intervention: Educate and encourage incentive spirometry usage  Pt given IS. Further education needs to be provided. Pt is able to deeply cough, but not productive at this time.

## 2017-11-22 NOTE — PROGRESS NOTES
[facility] Hospitalist Progress Note    Date of Service: 2017    Chief Complaint  77 y.o. female admitted 2017 with cough and CAP.  Imaging and Hx concerning for TB--> ruled OUT    Interval Problem Update  She feels good    Afebril    We are awaiting biopsy results    Consultants/Specialty  Pulmonology  ID    Disposition  pending        Review of Systems   Constitutional: Negative for chills and fever.   Respiratory: Positive for cough, sputum production and shortness of breath.    Cardiovascular: Negative for chest pain.   Gastrointestinal: Negative for abdominal pain, diarrhea, nausea and vomiting.   Musculoskeletal: Negative for back pain.   Skin: Negative for rash.   Neurological: Negative for dizziness, loss of consciousness and headaches.      Physical Exam  Laboratory/Imaging   Hemodynamics  Temp (24hrs), Av.8 °C (98.2 °F), Min:36.3 °C (97.3 °F), Max:37.6 °C (99.7 °F)   Temperature: 36.6 °C (97.8 °F)  Pulse  Av.8  Min: 72  Max: 134    Blood Pressure : 120/70      Respiratory      Respiration: 12, Pulse Oximetry: 96 %     Work Of Breathing / Effort: Mild  RUL Breath Sounds: Diminished, RML Breath Sounds: Fine Crackles, RLL Breath Sounds: Fine Crackles, RENEE Breath Sounds: Diminished, LLL Breath Sounds: Fine Crackles    Fluids  No intake or output data in the 24 hours ending 17 1148    Nutrition  Orders Placed This Encounter   Procedures   • DIET ORDER     Standing Status:   Standing     Number of Occurrences:   1     Order Specific Question:   Diet:     Answer:   Regular [1]     Physical Exam   Constitutional: She is oriented to person, place, and time. She appears well-developed and well-nourished. No distress.   HENT:   Head: Normocephalic and atraumatic.   Neck: No JVD present.   Cardiovascular: Normal rate and regular rhythm.    Pulmonary/Chest: Effort normal. No stridor. No respiratory distress. She has no wheezes. She has no rales.   Abdominal: Soft. There is no tenderness. There is  no rebound and no guarding.   Musculoskeletal: She exhibits no edema.   Neurological: She is oriented to person, place, and time.   Skin: Skin is warm and dry. No rash noted. She is not diaphoretic.   Psychiatric: She has a normal mood and affect. Thought content normal.   Nursing note and vitals reviewed.      Recent Labs      11/22/17   0344   WBC  12.6*   RBC  3.62*   HEMOGLOBIN  10.6*   HEMATOCRIT  33.4*   MCV  92.3   MCH  29.3   MCHC  31.7*   RDW  43.4   PLATELETCT  717*   MPV  8.3*     Recent Labs      11/22/17   0343   SODIUM  138   POTASSIUM  3.8   CHLORIDE  103   CO2  27   GLUCOSE  88   BUN  10   CREATININE  0.72   CALCIUM  9.3                      Assessment/Plan     * Community acquired pneumonia- (present on admission)   Assessment & Plan    Unclear pathogen  Very high risk for TB given presentation-- > TB testing NEGATIVE  BAL done 11/19  ID following--> on iv merrem    Await pathology        Anemia- (present on admission)   Assessment & Plan    Iron deficiency    Replace iron po          Sepsis (CMS-HCC)- (present on admission)   Assessment & Plan    Mild/simple, c/w measures            Reviewed items::  Radiology images reviewed, EKG reviewed, Labs reviewed and Medications reviewed  Wilson catheter::  No Wilson  DVT prophylaxis pharmacological::  Not indicated at this time, ambulatory  DVT prophylaxis - mechanical:  SCDs  Ulcer Prophylaxis::  Not indicated  Antibiotics:  Treating active infection/contamination beyond 24 hours perioperative coverage

## 2017-11-22 NOTE — PROGRESS NOTES
"Pulmonary Progress Note  DOS: 11/22/2017    Patient ID:   Name:             Irina Conrad     YOB: 1940  Age:                 77 y.o.  female   MRN:               [id number]                                                  Subjective:  Feeling very well today, no SOB or chest pain, some mild cough persists    Interval Changes:   Sating well on RA  Pt has been suffering from pna since Sep'17.  11/20: Sating well on RA.   11/21: RA sat normal. Sputum cx and BAL cx came back pos for multidrug resistant pseudomonas sp. Started on meropenem from today.  11/22: patient will need abx for 2 weeks starting yesterday.     Objective:   Vitals/ General Appearance:   Weight/BMI: Body mass index is 22.48 kg/m².  Blood pressure (!) 96/64, pulse 97, temperature 36.7 °C (98.1 °F), resp. rate 16, height 1.372 m (4' 6\"), weight 42.3 kg (93 lb 4.1 oz), SpO2 93 %, not currently breastfeeding.  Vitals:    11/21/17 2315 11/22/17 0313 11/22/17 0823 11/22/17 1228   BP: 130/82 119/67 120/70 (!) 96/64   Pulse: 84 72 (!) 102 97   Resp: 16 16 12 16   Temp: 36.3 °C (97.3 °F) 36.4 °C (97.5 °F) 36.6 °C (97.8 °F) 36.7 °C (98.1 °F)   TempSrc:       SpO2: 93% 92% 96% 93%   Weight:       Height:         Oxygen Therapy:  Pulse Oximetry: 93 %, O2 (LPM): 0, O2 Delivery: None (Room Air)    Constitutional:   Well developed, Well nourished, No acute distress  HENMT:  Normocephalic, Atraumatic, Oropharynx moist mucous membranes, No oral exudates, Nose normal.  No thyromegaly.  Eyes:  EOMI, Conjunctiva normal, No discharge.  Neck:  Normal range of motion, No cervical tenderness,  no JVD.  Cardiovascular:  Normal heart rate, Normal rhythm, No murmurs, No rubs, No gallops.   Extremitites with intact distal pulses, no cyanosis, or edema.  Lungs: Not in acute respiratory distress. Mild rales in the R upper lung with scattered wheeze noted.  Abdomen: Bowel sounds normal, Soft, No tenderness, No guarding, No rebound, No masses, No " hepatosplenomegaly.  Skin: Warm, Dry, No erythema, No rash, no induration.  Neurologic: Alert & oriented x 3, No focal deficits noted, cranial nerves II through X are grossly intact.  Psychiatric: Affect normal, Judgment normal, Mood normal.    Labs:  Recent Labs      11/22/17   0344   WBC  12.6*   RBC  3.62*   HEMOGLOBIN  10.6*   HEMATOCRIT  33.4*   MCV  92.3   MCH  29.3   MCHC  31.7*   RDW  43.4   PLATELETCT  717*   MPV  8.3*                 Recent Labs      11/22/17   0343   SODIUM  138   POTASSIUM  3.8   CHLORIDE  103   CO2  27   GLUCOSE  88   BUN  10     Recent Labs      11/22/17   0343   SODIUM  138   POTASSIUM  3.8   CHLORIDE  103   CO2  27   BUN  10   CREATININE  0.72   CALCIUM  9.3     No results found for this or any previous visit.      Imaging:   DX-CHEST-LIMITED (1 VIEW)   Final Result      1.  Again seen masslike density within the right upper lobe, unchanged.      2.  Again seen patchy bilateral infiltrates.      3.  Mild cardiomegaly.      CT-CHEST (THORAX) WITH   Final Result      1.  Consolidation is identified which is prominent in the right lung apex. Findings could be due to pneumonia. Tuberculosis is a possibility.      2.  Surrounding groundglass opacification is also noted in the right upper lobe.      3.  Additional scattered multifocal opacifications are noted in each lung. Bronchiectasis is also present. Findings are likely due to infectious or inflammatory process.      4.  Mildly enlarged lymph nodes are noted in the upper mediastinum and pretracheal area.            DX-CHEST-PORTABLE (1 VIEW)   Final Result         1.  Patchy bilateral pulmonary opacities, greatest in the right apex, stable since prior, most compatible with infiltrates. Radiographic follow-up to resolution recommended. As previously recommended exclusion of tuberculosis as clinically warranted.   2.  [facility] Medications:    Current Facility-Administered Medications:   •  ferrous sulfate tablet  325 mg, 325 mg, Oral, BID WITH MEALS, Asa Marroquin M.D.  •  meropenem (MERREM) 1 g in  mL IVPB, 1,000 mg, Intravenous, Q8HRS, Matt Mendoza M.D., Stopped at 11/22/17 0541  •  benzonatate (TESSALON) capsule 100 mg, 100 mg, Oral, TID PRN, Alex Hager M.D., 100 mg at 11/20/17 0504  •  temazepam (RESTORIL) capsule 15 mg, 15 mg, Oral, QHS PRN, Alex Hager M.D.  •  polyethylene glycol/lytes (MIRALAX) PACKET 1 Packet, 1 Packet, Oral, DAILY, Alex Hager M.D., 1 Packet at 11/22/17 0743  •  famotidine (PEPCID) tablet 20 mg, 20 mg, Oral, BID, Alex Hager M.D., 20 mg at 11/22/17 0743  •  hyoscyamine-maalox plus-lidocaine viscous (GI COCKTAIL) oral susp 30 mL, 30 mL, Oral, Q6HRS PRN, Alex Hager M.D.  •  enoxaparin (LOVENOX) inj 30 mg, 30 mg, Subcutaneous, DAILY, Alex Hager M.D., 30 mg at 11/22/17 0743  •  sucralfate (CARAFATE) tablet 1 g, 1 g, Oral, Q6HRS, Jabari Ramirez M.D., 1 g at 11/22/17 1133  •  fluticasone (FLONASE) nasal spray 50 mcg, 1 Spray, Nasal, DAILY, Jabari Ramirez M.D., 50 mcg at 11/22/17 0744  •  loratadine (CLARITIN) tablet 10 mg, 10 mg, Oral, DAILY, Jabari Ramirez M.D., 10 mg at 11/22/17 0743  •  senna-docusate (PERICOLACE or SENOKOT S) 8.6-50 MG per tablet 2 Tab, 2 Tab, Oral, BID, 2 Tab at 11/22/17 0743 **AND** polyethylene glycol/lytes (MIRALAX) PACKET 1 Packet, 1 Packet, Oral, QDAY PRN, 1 Packet at 11/18/17 1008 **AND** magnesium hydroxide (MILK OF MAGNESIA) suspension 30 mL, 30 mL, Oral, QDAY PRN **AND** bisacodyl (DULCOLAX) suppository 10 mg, 10 mg, Rectal, QDAY PRN, Jabari Ramirez M.D.  •  Respiratory Care per Protocol, , Nebulization, Continuous RT, Jabari Ramirez M.D.  •  NS (BOLUS) infusion 500 mL, 500 mL, Intravenous, Once PRN, Jabari Ramirez M.D.  •  acetaminophen (TYLENOL) tablet 650 mg, 650 mg, Oral, Q6HRS PRN, Jabari Ramirez M.D., 650 mg at 11/18/17 1600  •  labetalol (NORMODYNE,TRANDATE) injection 10 mg, 10 mg, Intravenous,  Q4HRS PRN, Jabari Ramirez M.D.  •  ondansetron (ZOFRAN) syringe/vial injection 4 mg, 4 mg, Intravenous, Q4HRS PRN, Jabari Ramirez M.D.  •  ondansetron (ZOFRAN ODT) dispertab 4 mg, 4 mg, Oral, Q4HRS PRN, Jabari Ramirez M.D.    Current Outpatient Medications:  Prescriptions Prior to Admission   Medication Sig Dispense Refill Last Dose   • [] doxycycline (VIBRAMYCIN) 100 MG Tab Take 1 Tab by mouth 2 times a day for 10 days. 20 Tab 0 2017 at 0800   • fluticasone (FLONASE) 50 MCG/ACT nasal spray Spray 1 Spray in nose every day. 16 g 2 2017 at 0800   • loratadine (CLARITIN) 10 MG Tab Take 1 Tab by mouth every day. 30 Tab 0 2017 at 0800       Medication Allergy:  Allergies   Allergen Reactions   • Chocolate Shortness of Breath   • Chocolate Flavor Shortness of Breath       Assessment and Plan:  Active Problems:    Community acquired pneumonia POA: Yes    Sepsis (CMS-HCC) POA: Yes    Resolved     Anemia POA: Yes    Sp Cx positive MDR Pseudomonas sp.    Plan:  Started on meropenem   Cont supportive tm.  Will cont to follow  Cont supportive care.  ID input appreciated. Treatment per ID   Pt has  bronchiectasis in the CT probably this is chronic and proceeded her current pneumonia. She will need pulmonary appointment after discharge     Pulmonary will sign off. Please call for any questions.

## 2017-11-22 NOTE — PROGRESS NOTES
Vicky Chen Fall Risk Assessment:     Last Known Fall: Within the last six months  Mobility: No limitations  Medications: Cardiovascular or central nervous system meds  Mental Status/LOC/Awareness: Awake, alert, and oriented to date, place, and person  Toileting Needs: No needs  Volume/Electrolyte Status: No problems  Communication/Sensory: Visual (Glasses)/hearing deficit  Behavior: Appropriate behavior  Vicky Chen Fall Risk Total: 7  Fall Risk Level: LOW RISK    Universal Fall Precautions:  call light/belongings in reach, bed in low position and locked, siderails up x 2, use non-slip footwear, adequate lighting, clutter free and spill free environment, educate on level of risk, educate to call for assistance    Fall Risk Level Interventions:   TRIAL ([employer], NEURO, MED EMIL 5) Low Fall Risk Interventions  Place yellow fall risk ID band on patient: verified  Provide patient/family education based on risk assessment: completed  Educate patient/family to call staff for assistance when getting out of bed: completed  Place fall precaution signage outside patient door: verifiedTRIAL ([employer], NEURO, MED EMIL 5) Moderate Fall Risk Interventions  Place yellow fall risk ID band on patient: verified  Provide patient/family education based on risk assessment : verified  Educate patient/family to call staff for assistance when getting out of bed: verified  Place fall precaution signage outside patient door: verified  Utilize bed/chair fall alarm: verified     Patient Specific Interventions:     Medication: review medications with patient and family and limit combination of prn medications  Mental Status/LOC/Awareness: check on patient hourly  Toileting: provide frquent toileting  Volume/Electrolyte Status: ensure patient remains hydrated and monitor abnormal lab values  Communication/Sensory: update plan of care on whiteboard  Behavioral: not applicable  Mobility: ensure bed is locked and in lowest position

## 2017-11-22 NOTE — DIETARY
"Nutrition Services: Pt seen for weekly rescreen.  Unable to interview pt, pt visiting with family members long distance via Skype. RN reports pt with minimal intake (~ 30% of meals today) likely related to pt's taste/cultural preferences for food (pt likes [place] type foods). Current wt is 43.2 kg/ 95 lbs, ht is 4'6\",  and BMI of 22.96. Labs/Meds reviewed. No skin breakdown per RN.     Plan/rec: Will add Boost Plus with meals to help optimize PO intake.  Nutrition Representative to see pt daily for snacks/meal preferences as appropriate with diet order. RD following.     "

## 2017-11-22 NOTE — CARE PLAN
Problem: Infection  Goal: Will remain free from infection  Outcome: PROGRESSING AS EXPECTED  Isolation precautions dc'd today, IV anibiotics in ise, ID following

## 2017-11-22 NOTE — PROGRESS NOTES
Bedside report received from Eduardo DRAKE. Patient's chart and MAR reviewed. 12 hour chart check complete. Pt is awake in bed. Pt is AOx4. Patient was updated on plan of care for the day. Questions answered and concerns addressed.  Pt denies any additional needs at this time. White board updated. Call light and personal belongings within reach, bed in lowest position.

## 2017-11-22 NOTE — CARE PLAN
Problem: Safety  Goal: Will remain free from injury  Outcome: PROGRESSING AS EXPECTED    Intervention: Provide assistance with mobility  Encouraged to increase activity  Intervention: Educate patient and significant other/support system about adaptive mobility strategies and safe transfers  Advises to call Nurse for amny asssitance needed, call light within reach.

## 2017-11-22 NOTE — PROGRESS NOTES
Infectious Disease Progress Note    Author: Nika Rangel M.D. Date & Time of service: 2017  10:00 AM    Chief Complaint:  FU PNA    Interval History:   77 y.o. [place] woman admitted 2017 for pneumonia   AF (99.9) WBC 13.2 minimal cough-denies SE abx   Increased temp 101.5, WBC 14 plan for bronch in am   AF WBC 11.3 cough with clear sputum post-bronch   AF, no CBC, feels better, denies any weakness, some phlegm and improved cough, poor understanding of illness   AF, no CBC, denies any complaints, breathing comfortably on room air, BAL cx +PSAR   AF, WBC 12.6, feels well without any new symptoms, denies cough but sometimes has phlegm   Labs Reviewed, Medications Reviewed and Radiology Reviewed.    Review of Systems:  Review of Systems   Constitutional: Positive for weight loss. Negative for chills and fever.   Respiratory: Positive for sputum production. Negative for shortness of breath.    Gastrointestinal: Negative for abdominal pain, diarrhea, nausea and vomiting.   Neurological: Negative for weakness.       Hemodynamics:  Temp (24hrs), Av.8 °C (98.2 °F), Min:36.3 °C (97.3 °F), Max:37.6 °C (99.7 °F)  Temperature: 36.6 °C (97.8 °F)  Pulse  Av.8  Min: 72  Max: 134   Blood Pressure : 120/70       Physical Exam:  Physical Exam   Constitutional: She is oriented to person, place, and time. She appears well-developed. No distress.   HENT:   Head: Normocephalic and atraumatic.   Eyes: EOM are normal. Pupils are equal, round, and reactive to light.   Cardiovascular: Normal rate and regular rhythm.    Pulmonary/Chest: Effort normal. No respiratory distress. She has no wheezes. She has rales.   Abdominal: Soft. She exhibits no distension. There is no tenderness.   Musculoskeletal: She exhibits edema.   Neurological: She is alert and oriented to person, place, and time.   Skin: No rash noted.   Nursing note and vitals reviewed.      Meds:    Current  Facility-Administered Medications:   •  meropenem (MERREM) IV  •  benzonatate  •  temazepam  •  polyethylene glycol/lytes  •  famotidine  •  hyoscyamine-maalox plus-lidocaine viscous  •  enoxaparin (LOVENOX) injection  •  sucralfate  •  fluticasone  •  loratadine  •  senna-docusate **AND** polyethylene glycol/lytes **AND** magnesium hydroxide **AND** bisacodyl  •  Respiratory Care per Protocol  •  NS  •  acetaminophen  •  labetalol  •  ondansetron  •  ondansetron    Labs:  Recent Labs      11/22/17   0344   WBC  12.6*   RBC  3.62*   HEMOGLOBIN  10.6*   HEMATOCRIT  33.4*   MCV  92.3   MCH  29.3   RDW  43.4   PLATELETCT  717*   MPV  8.3*   NEUTSPOLYS  74.60*   LYMPHOCYTES  15.80*   MONOCYTES  5.30   EOSINOPHILS  2.60   BASOPHILS  0.00   RBCMORPHOLO  Normal     Recent Labs      11/22/17   0343   SODIUM  138   POTASSIUM  3.8   CHLORIDE  103   CO2  27   GLUCOSE  88   BUN  10     Recent Labs      11/22/17   0343   CREATININE  0.72       Imaging:  Ct-chest (thorax) With    Result Date: 11/16/2017 11/16/2017 6:55 PM HISTORY/REASON FOR EXAM:  Shortness of breath fever cough with history of pneumonia TECHNIQUE/EXAM DESCRIPTION: CT scan of the chest with contrast. Thin-section helical images were obtained from the lung apices through the adrenal glands following the bolus administration of 80ml mL of Optiray 350 nonionic contrast. Low dose optimization technique was utilized for this CT exam including automated exposure control and adjustment of the mA and/or kV according to patient size. COMPARISON:  None FINDINGS: Extensive consolidation is noted in the right upper lobe extending to the right lung apex. Surrounding areas of groundglass opacification are noted in the right upper lobe. Additional scattered multifocal irregular shaped and nodular opacifications with ill-defined borders are noted in both lungs. Bronchiectasis is also present bilaterally. No pleural effusions are identified. There is no pneumothorax. Mild upper  mediastinal and pretracheal adenopathy is noted. There is no evidence of pericardial effusion. No large masses are seen within the upper abdomen.     1.  Consolidation is identified which is prominent in the right lung apex. Findings could be due to pneumonia. Tuberculosis is a possibility. 2.  Surrounding groundglass opacification is also noted in the right upper lobe. 3.  Additional scattered multifocal opacifications are noted in each lung. Bronchiectasis is also present. Findings are likely due to infectious or inflammatory process. 4.  Mildly enlarged lymph nodes are noted in the upper mediastinum and pretracheal area.     Dx-chest-2 Views    Result Date: 11/11/2017 11/11/2017 10:22 AM HISTORY/REASON FOR EXAM:  Shortness of Breath Cough, intermittent fever TECHNIQUE/EXAM DESCRIPTION AND NUMBER OF VIEWS: Two views of the chest. COMPARISON:  None available. FINDINGS: Cardiac contour is mildly prominent. Patchy parenchymal and interstitial opacities in both lungs. Focal opacity at the RIGHT lung apex. No pleural fluid collection or pneumothorax. Degenerative change of thoracic spine.     1.  Patchy interstitial and alveolar opacities in both lungs, likely inflammatory. 2.  Focal opacity RIGHT lung apex may represent mass or focal inflammatory process. 3.  Tuberculosis is not excluded.    Dx-chest-portable (1 View)    Result Date: 11/13/2017 11/13/2017 8:14 PM HISTORY/REASON FOR EXAM:  Possible sepsis TECHNIQUE/EXAM DESCRIPTION:  Single AP view of the chest. COMPARISON: Today at 1012 FINDINGS: The cardiac silhouette appears within normal limits. Atherosclerotic calcification of the aorta is noted.  The central pulmonary vasculature appears normal. The lungs appear well expanded bilaterally.  Scattered patchy bilateral pulmonary opacities are seen, greatest in the right apex. No significant pleural effusions are identified. The bony structures appear age-appropriate.     1.  Patchy bilateral pulmonary opacities,  greatest in the right apex, stable since prior, most compatible with infiltrates. Radiographic follow-up to resolution recommended. As previously recommended exclusion of tuberculosis as clinically warranted. 2.  Atherosclerosis      Micro:  Results     Procedure Component Value Units Date/Time    AFB [[id number]] Collected:  11/19/17 0830    Order Status:  Completed Specimen:  Respirate from Bronchoalveolar Lavage Updated:  11/21/17 0841     Significant Indicator NEG     Source RESP     Site BRONCHOALVEOLAR LAVAGE     AFB Culture Culture in progress.     AFB Smear Results No acid fast bacilli seen.    Narrative:       Airborne,[id number] GREEN JIMY L  Which Lobe (Bronch Only):->RUL    RESP CULTURE [[id number]]  (Abnormal) Collected:  11/19/17 0830    Order Status:  Completed Specimen:  Respirate from Bronchoalveolar Lavage Updated:  11/21/17 0841     Gram Stain Result --     Moderate WBCs.  No organisms seen.       Significant Indicator POS (POS)     Source RESP     Site BRONCHOALVEOLAR LAVAGE     Respiratory Culture Rare growth usual upper respiratory janice (A)     Respiratory Culture -- (A)     Pseudomonas aeruginosa  Light growth  See previous culture for sensitivity report.  P.aeruginosa may develop resistance during prolonged therapy  with all antibiotics. Isolates that are initially susceptible  may become resistant within three to four days after  initiation of therapy. Testing of repeat isolates may be  warranted.      Narrative:       Airborne,[id number] GREEN JIMY L  Which Lobe (Bronch Only):->RUL    CULTURE RESPIRATORY W/ GRM STN [[id number]]  (Abnormal)  (Susceptibility) Collected:  11/18/17 0949    Order Status:  Completed Specimen:  Respirate from Sputum Induced Updated:  11/21/17 0828     Gram Stain Result --     Many WBCs.  Many mixed bacteria, no predominant organism seen.  Few epithelial cells.  Specimen Quality Score: 2+       Significant Indicator POS (POS)     Source RESP     Site SPUTUM  INDUCED     Respiratory Culture -- (A)     Moderate growth usual upper respiratory janice  , including  yeast.       Respiratory Culture -- (A)     Pseudomonas aeruginosa  Light growth  The Piperacillin ISA is >16 ug/mL, use Piperacillin 4 Gm q6h  plus synergistic therapy with Tobramycin.  P.aeruginosa may develop resistance during prolonged therapy  with all antibiotics. Isolates that are initially susceptible  may become resistant within three to four days after  initiation of therapy. Testing of repeat isolates may be  warranted.      Narrative:       Airborne,[id number] ALMA ROSA ADHIKARI  For AFB , indiced    Culture & Susceptibility     PSEUDOMONAS AERUGINOSA     Antibiotic Sensitivity Microscan Unit Status    Amikacin Sensitive <=16 mcg/mL Final    Cefepime Resistant >16 mcg/mL Final    Ceftazidime Resistant >16 mcg/mL Final    Ciprofloxacin Sensitive <=1 mcg/mL Final    Gentamicin Sensitive <=4 mcg/mL Final    Imipenem Sensitive <=1 mcg/mL Final    Meropenem Sensitive <=1 mcg/mL Final    Pip/Tazobactam Resistant >64 mcg/mL Final    Tobramycin Sensitive <=4 mcg/mL Final                       ACID FAST STAIN [[id number]] Collected:  11/19/17 0830    Order Status:  Completed Specimen:  Respirate Updated:  11/19/17 2040     Significant Indicator NEG     Source RESP     Site BRONCHOALVEOLAR LAVAGE     AFB Smear Results No acid fast bacilli seen.    Narrative:       Airborne,[id number] GREEN JIMY L  Which Lobe (Bronch Only):->RUL    GRAM STAIN [[id number]] Collected:  11/19/17 0830    Order Status:  Completed Specimen:  Respirate Updated:  11/19/17 2040     Significant Indicator .     Source RESP     Site BRONCHOALVEOLAR LAVAGE     Gram Stain Result --     Moderate WBCs.  No organisms seen.      Narrative:       Airborne,[id number] GREEN JIMY L  Which Lobe (Bronch Only):->RUL    BLOOD CULTURE [[id number]] Collected:  11/18/17 1927    Order Status:  Completed Specimen:  Blood from Peripheral Updated:   "11/19/17 0901     Significant Indicator NEG     Source BLD     Site PERIPHERAL     Blood Culture --     No Growth    Note: Blood cultures are incubated for 5 days and  are monitored continuously.Positive blood cultures  are called to the RN and reported as soon as  they are identified.      Narrative:       Airborne,Droplet  Per Hospital Policy: Only change Specimen Src: to \"Line\" if  specified by physician order.    BLOOD CULTURE [[id number]] Collected:  11/18/17 1928    Order Status:  Completed Specimen:  Blood from Peripheral Updated:  11/19/17 0901     Significant Indicator NEG     Source BLD     Site PERIPHERAL     Blood Culture --     No Growth    Note: Blood cultures are incubated for 5 days and  are monitored continuously.Positive blood cultures  are called to the RN and reported as soon as  they are identified.      Narrative:       Airborne,Droplet  Per Hospital Policy: Only change Specimen Src: to \"Line\" if  specified by physician order.    BLOOD CULTURE [[id number]] Collected:  11/13/17 2030    Order Status:  Completed Specimen:  Blood from Peripheral Updated:  11/18/17 2300     Significant Indicator NEG     Source BLD     Site PERIPHERAL     Blood Culture No growth after 5 days of incubation.    Narrative:       Per Hospital Policy: Only change Specimen Src: to \"Line\" if  specified by physician order.    GRAM STAIN [[id number]] Collected:  11/18/17 0949    Order Status:  Completed Specimen:  Respirate Updated:  11/18/17 2119     Significant Indicator .     Source RESP     Site SPUTUM INDUCED     Gram Stain Result --     Many WBCs.  Many mixed bacteria, no predominant organism seen.  Few epithelial cells.  Specimen Quality Score: 2+      Narrative:       Airborne,[id number] ALMA ROSA ADHIKARI  For AFB , indiced    BLOOD CULTURE [[id number]] Collected:  11/13/17 1740    Order Status:  Completed Specimen:  Blood from Peripheral Updated:  11/18/17 1900     Significant Indicator NEG     Source BLD     Site " "PERIPHERAL     Blood Culture No growth after 5 days of incubation.    Narrative:       Per Hospital Policy: Only change Specimen Src: to \"Line\" if  specified by physician order.    CULTURE RESPIRATORY W/ GRM STN [[id number]] Collected:  11/18/17 0949    Order Status:  Canceled Specimen:  Respirate from Sputum Induced     CULTURE RESPIRATORY W/ GRM STN [[id number]] Collected:  11/18/17 0949    Order Status:  Canceled Specimen:  Respirate from Sputum Induced     AFB CULTURE [[id number]] Collected:  11/16/17 0330    Order Status:  Completed Specimen:  Respirate from Sputum Induced Updated:  11/16/17 1606     Significant Indicator NEG     Source RESP     Site SPUTUM INDUCED     AFB Culture Culture in progress.     AFB Smear Results No acid fast bacilli seen.    Narrative:       Airborne,[id number] GABBY MEAZ    ACID FAST STAIN [[id number]] Collected:  11/16/17 0330    Order Status:  Completed Specimen:  Respirate Updated:  11/16/17 1606     Significant Indicator NEG     Source RESP     Site SPUTUM INDUCED     AFB Smear Results No acid fast bacilli seen.    Narrative:       Airborne,[id number] GABBY MEZA    URINE CULTURE(NEW) [[id number]] Collected:  11/14/17 1054    Order Status:  Completed Specimen:  Urine from Urine, Clean Catch Updated:  11/16/17 0926     Significant Indicator NEG     Source UR     Site URINE, CLEAN CATCH     Urine Culture Mixed skin janice 10-50,000 cfu/mL    Narrative:       Airborne,[id number] TEO JOY  Indication for culture:->Emergency Room Patient    CULTURE RESPIRATORY W/ GRM STN [[id number]] Collected:  11/14/17 2135    Order Status:  Completed Specimen:  Respirate from Sputum Induced Updated:  11/16/17 0910     Gram Stain Result --     Specimen Quality Score: 1+  Moderate WBCs.  Few Yeast.  Many mixed bacteria, no predominant organism seen.       Significant Indicator NEG     Source RESP     Site SPUTUM INDUCED     Respiratory Culture --     Moderate growth " usual upper respiratory janice  including yeast.      ACID FAST STAIN [[id number]] Collected:  11/14/17 1130    Order Status:  Completed Specimen:  Respirate Updated:  11/15/17 1826     Significant Indicator NEG     Source RESP     Site Sputum (coughed)     AFB Smear Results No acid fast bacilli seen.    Narrative:       Airborne,[id number] MUHAMMAD-PÉREZ LIO B    AFB CULTURE [[id number]] Collected:  11/14/17 1130    Order Status:  Completed Specimen:  Respirate from Sputum Induced Updated:  11/15/17 1826     Significant Indicator NEG     Source RESP     Site Sputum (coughed)     AFB Culture Culture in progress.     AFB Smear Results No acid fast bacilli seen.    Narrative:       Airborne,[id number] MUHAMMAD-PÉREZ LIO B    ACID FAST STAIN [[id number]] Collected:  11/14/17 2135    Order Status:  Completed Specimen:  Respirate Updated:  11/15/17 1826     Significant Indicator NEG     Source RESP     Site SPUTUM INDUCED     AFB Smear Results No acid fast bacilli seen.    Narrative:       Airborne,Droplet    AFB CULTURE [[id number]] Collected:  11/14/17 2135    Order Status:  Completed Specimen:  Respirate from Sputum Induced Updated:  11/15/17 1825     Significant Indicator NEG     Source RESP     Site SPUTUM INDUCED     AFB Culture Culture in progress.     AFB Smear Results No acid fast bacilli seen.    Narrative:       Airborne,Droplet          Assessment:  Active Hospital Problems    Diagnosis   • Community acquired pneumonia [J18.9]   • Anemia [D64.9]   • Sepsis (CMS-[facility]) [A41.9]     Plan:  Pneumonia-concern for TB  Fever resolved  + leukocytosis persistent but decreased overall  History, clinical course, and CXR concerning for TB - but neg AFB  TB quant - neg  CT chest c/w TB-consolidation RUL +LAD  Appreciate pulm eval  S/p bronch on 11/19  BAL cx - neg for AFB  Sputum AFB x 3 negative  Blood cxs 11/18 neg  BAL cx + MDR PSAR (R-zosyn)  Transition to meropenem  Anticipate 2 week course from 11/21  May be able  to transition to PO cipro if no prolonged QTC.  Check EKG  Path - pending    Thrombocytosis  Acute phase reactant  Should resolve with treatment infection    Sepsis (CMS-[facility])- (present on admission)  Abx as above  Blood cxs previously neg  Ucx neg    Plan of care discussed with pulm/Dr. Marroquin

## 2017-11-22 NOTE — CARE PLAN
Problem: Communication  Goal: The ability to communicate needs accurately and effectively will improve  Outcome: PROGRESSING AS EXPECTED  Pt is [place] but able to communicate her needs.

## 2017-11-23 LAB
BACTERIA BLD CULT: NORMAL
BACTERIA BLD CULT: NORMAL
SIGNIFICANT IND 70042: NORMAL
SIGNIFICANT IND 70042: NORMAL
SITE SITE: NORMAL
SITE SITE: NORMAL
SOURCE SOURCE: NORMAL
SOURCE SOURCE: NORMAL

## 2017-11-23 PROCEDURE — 700102 HCHG RX REV CODE 250 W/ 637 OVERRIDE(OP): Performed by: INTERNAL MEDICINE

## 2017-11-23 PROCEDURE — 700105 HCHG RX REV CODE 258: Performed by: INTERNAL MEDICINE

## 2017-11-23 PROCEDURE — 700111 HCHG RX REV CODE 636 W/ 250 OVERRIDE (IP): Performed by: INTERNAL MEDICINE

## 2017-11-23 PROCEDURE — A9270 NON-COVERED ITEM OR SERVICE: HCPCS | Performed by: INTERNAL MEDICINE

## 2017-11-23 PROCEDURE — 99232 SBSQ HOSP IP/OBS MODERATE 35: CPT | Performed by: HOSPITALIST

## 2017-11-23 PROCEDURE — A9270 NON-COVERED ITEM OR SERVICE: HCPCS | Performed by: HOSPITALIST

## 2017-11-23 PROCEDURE — 770020 HCHG ROOM/CARE - TELE (206)

## 2017-11-23 PROCEDURE — 700102 HCHG RX REV CODE 250 W/ 637 OVERRIDE(OP): Performed by: HOSPITALIST

## 2017-11-23 PROCEDURE — 700111 HCHG RX REV CODE 636 W/ 250 OVERRIDE (IP): Performed by: HOSPITALIST

## 2017-11-23 RX ADMIN — MEROPENEM 1 G: 1 INJECTION, POWDER, FOR SOLUTION INTRAVENOUS at 21:00

## 2017-11-23 RX ADMIN — STANDARDIZED SENNA CONCENTRATE AND DOCUSATE SODIUM 2 TABLET: 8.6; 5 TABLET, FILM COATED ORAL at 08:29

## 2017-11-23 RX ADMIN — MEROPENEM 1 G: 1 INJECTION, POWDER, FOR SOLUTION INTRAVENOUS at 14:43

## 2017-11-23 RX ADMIN — FAMOTIDINE 20 MG: 20 TABLET, FILM COATED ORAL at 20:59

## 2017-11-23 RX ADMIN — SUCRALFATE 1 G: 1 TABLET ORAL at 17:46

## 2017-11-23 RX ADMIN — Medication 325 MG: at 08:30

## 2017-11-23 RX ADMIN — ENOXAPARIN SODIUM 30 MG: 100 INJECTION SUBCUTANEOUS at 08:29

## 2017-11-23 RX ADMIN — MEROPENEM 1 G: 1 INJECTION, POWDER, FOR SOLUTION INTRAVENOUS at 06:26

## 2017-11-23 RX ADMIN — SUCRALFATE 1 G: 1 TABLET ORAL at 06:26

## 2017-11-23 RX ADMIN — Medication 325 MG: at 17:46

## 2017-11-23 RX ADMIN — FLUTICASONE PROPIONATE 50 MCG: 50 SPRAY, METERED NASAL at 08:30

## 2017-11-23 RX ADMIN — LORATADINE 10 MG: 10 TABLET ORAL at 08:29

## 2017-11-23 RX ADMIN — SUCRALFATE 1 G: 1 TABLET ORAL at 11:54

## 2017-11-23 RX ADMIN — FAMOTIDINE 20 MG: 20 TABLET, FILM COATED ORAL at 08:29

## 2017-11-23 RX ADMIN — SUCRALFATE 1 G: 1 TABLET ORAL at 00:17

## 2017-11-23 ASSESSMENT — ENCOUNTER SYMPTOMS
SHORTNESS OF BREATH: 1
SPUTUM PRODUCTION: 1
WEIGHT LOSS: 1
FEVER: 0
DIZZINESS: 0
WEAKNESS: 0
NAUSEA: 0
BACK PAIN: 0
ABDOMINAL PAIN: 0
SHORTNESS OF BREATH: 0
CHILLS: 0
DIARRHEA: 0
HEADACHES: 0
VOMITING: 0
COUGH: 1
LOSS OF CONSCIOUSNESS: 0

## 2017-11-23 ASSESSMENT — PAIN SCALES - GENERAL
PAINLEVEL_OUTOF10: 0

## 2017-11-23 NOTE — PROGRESS NOTES
Assumed care at 1900, bedside report received from day shift RN. Pt. Is SR on the monitor. Initial assessment completed, orders reviewed, call light within reach and hourly rounding in place. POC addressed with patient, no additional questions at this time.

## 2017-11-23 NOTE — PROGRESS NOTES
Vicky Chen Fall Risk Assessment:     Last Known Fall: Within the last six months  Mobility: No limitations  Medications: Cardiovascular or central nervous system meds  Mental Status/LOC/Awareness: Awake, alert, and oriented to date, place, and person  Toileting Needs: No needs  Volume/Electrolyte Status: No problems  Communication/Sensory: Visual (Glasses)/hearing deficit  Behavior: Appropriate behavior  Vicky Chen Fall Risk Total: 7  Fall Risk Level: LOW RISK    Universal Fall Precautions:  bed in low position and locked, call light/belongings in reach, wheelchairs and assistive devices out of sight, siderails up x 2, use non-slip footwear, adequate lighting, clutter free and spill free environment, educate on level of risk, educate to call for assistance    Fall Risk Level Interventions:   TRIAL ([employer], NEURO, MED EMIL 5) Low Fall Risk Interventions  Place yellow fall risk ID band on patient: verified  Provide patient/family education based on risk assessment: completed  Educate patient/family to call staff for assistance when getting out of bed: completed  Place fall precaution signage outside patient door: verifiedTRIAL ([employer], NEURO, MED EMIL 5) Moderate Fall Risk Interventions  Place yellow fall risk ID band on patient: verified  Provide patient/family education based on risk assessment : verified  Educate patient/family to call staff for assistance when getting out of bed: verified  Place fall precaution signage outside patient door: verified  Utilize bed/chair fall alarm: verified     Patient Specific Interventions:     Medication: review medications with patient and family, assess for medications that can be discontinued or dosage decreased and limit combination of prn medications  Mental Status/LOC/Awareness: reinforce falls education, check on patient hourly and reinforce the use of call light  Toileting: provide frquent toileting, instruct patient/family on the use of grab bars and instruct  patient/family on the need to call for assistance when toileting  Volume/Electrolyte Status: ensure patient remains hydrated and monitor abnormal lab values  Communication/Sensory: update plan of care on whiteboard, ensure proper positioning when transferrng/ambulating and ensure patient has glasses/contacts and hearing aids/dentures  Behavioral: encourage patient to voice feelings, engage patient in daily activities, administer medication as ordered and instruct/reinforce fall program rationale  Mobility: schedule physical activity throughout the day, provide comfort measures during transport, dangle prior to standing, ensure bed is locked and in lowest position, provide appropriate assistive device and instruct patient to exit bed on their strongest side

## 2017-11-23 NOTE — CARE PLAN
Problem: Communication  Goal: The ability to communicate needs accurately and effectively will improve  Outcome: PROGRESSING AS EXPECTED  Pt. Is able to express comfort needs effectively. Pt. Denies pain or discomfort at this time and is able to vocalize wants and needs.

## 2017-11-23 NOTE — PROGRESS NOTES
Assumed care at 0700, bedside report received from night shift RN. Patient is AOx4 with no signs of distress. Pt. Is SR 86 on the monitor. Initial assessment completed, orders reviewed, call light within reach, and hourly rounding in place. POC addressed with patient, no additional questions at this time.

## 2017-11-23 NOTE — PROGRESS NOTES
Vicky Chen Fall Risk Assessment:     Last Known Fall: Within the last six months  Mobility: No limitations  Medications: Cardiovascular or central nervous system meds  Mental Status/LOC/Awareness: Awake, alert, and oriented to date, place, and person  Toileting Needs: No needs  Volume/Electrolyte Status: No problems  Communication/Sensory: Visual (Glasses)/hearing deficit  Behavior: Appropriate behavior  Vicky Chen Fall Risk Total: 7  Fall Risk Level: LOW RISK    Universal Fall Precautions:  call light/belongings in reach, bed in low position and locked, wheelchairs and assistive devices out of sight, siderails up x 2, use non-slip footwear, adequate lighting, clutter free and spill free environment, educate on level of risk, educate to call for assistance    Fall Risk Level Interventions:   TRIAL ([employer], NEURO, MED EMIL 5) Low Fall Risk Interventions  Place yellow fall risk ID band on patient: verified  Provide patient/family education based on risk assessment: completed  Educate patient/family to call staff for assistance when getting out of bed: completed  Place fall precaution signage outside patient door: verifiedTRIAL ([employer], NEURO, MED EMIL 5) Moderate Fall Risk Interventions  Place yellow fall risk ID band on patient: verified  Provide patient/family education based on risk assessment : verified  Educate patient/family to call staff for assistance when getting out of bed: verified  Place fall precaution signage outside patient door: verified  Utilize bed/chair fall alarm: verified     Patient Specific Interventions:     Medication: review medications with patient and family and limit combination of prn medications  Mental Status/LOC/Awareness: reorient patient, reinforce falls education and reinforce the use of call light  Toileting: monitor intake and output/use of appropriate interventions  Volume/Electrolyte Status: ensure patient remains hydrated and monitor abnormal lab  values  Communication/Sensory: update plan of care on whiteboard  Behavioral: administer medication as ordered and instruct/reinforce fall program rationale  Mobility: ensure bed is locked and in lowest position and instruct patient to exit bed on their strongest side

## 2017-11-23 NOTE — PROGRESS NOTES
[facility]    Date of Service: 2017    Chief Complaint  77 y.o. female admitted 2017 with cough and CAP.  Imaging and Hx concerning for TB--> ruled OUT    Interval Problem Update  Very pleasant, no complaints    Afebrie    We are awaiting biopsy results    Consultants/Specialty  Pulmonology  ID    Disposition  pending        Review of Systems   Constitutional: Negative for chills and fever.   Respiratory: Positive for cough, sputum production and shortness of breath.    Cardiovascular: Negative for chest pain.   Gastrointestinal: Negative for abdominal pain, diarrhea, nausea and vomiting.   Musculoskeletal: Negative for back pain.   Skin: Negative for rash.   Neurological: Negative for dizziness, loss of consciousness and headaches.      Physical Exam  Laboratory/Imaging   Hemodynamics  Temp (24hrs), Av.8 °C (98.3 °F), Min:36.5 °C (97.7 °F), Max:37.8 °C (100 °F)   Temperature: 36.6 °C (97.8 °F)  Pulse  Av.3  Min: 72  Max: 134    Blood Pressure : (!) 96/57      Respiratory      Respiration: 16, Pulse Oximetry: 93 %     Work Of Breathing / Effort: Mild  RUL Breath Sounds: Clear, RML Breath Sounds: Diminished, RLL Breath Sounds: Diminished, RENEE Breath Sounds: Clear, LLL Breath Sounds: Diminished    Fluids    Intake/Output Summary (Last 24 hours) at 17 1055  Last data filed at 17 0800   Gross per 24 hour   Intake              240 ml   Output                0 ml   Net              240 ml       Nutrition  Orders Placed This Encounter   Procedures   • DIET ORDER     Standing Status:   Standing     Number of Occurrences:   1     Order Specific Question:   Diet:     Answer:   Regular [1]     Physical Exam   Constitutional: She is oriented to person, place, and time. She appears well-developed and well-nourished. No distress.   HENT:   Head: Normocephalic and atraumatic.   Neck: No JVD present.   Cardiovascular: Normal rate and regular rhythm.    Pulmonary/Chest: Effort  normal. No stridor. No respiratory distress. She has no wheezes. She has no rales.   Abdominal: Soft. There is no tenderness. There is no rebound and no guarding.   Musculoskeletal: She exhibits no edema.   Neurological: She is oriented to person, place, and time.   Skin: Skin is warm and dry. No rash noted. She is not diaphoretic.   Psychiatric: She has a normal mood and affect. Thought content normal.   Nursing note and vitals reviewed.      Recent Labs      11/22/17   0344   WBC  12.6*   RBC  3.62*   HEMOGLOBIN  10.6*   HEMATOCRIT  33.4*   MCV  92.3   MCH  29.3   MCHC  31.7*   RDW  43.4   PLATELETCT  717*   MPV  8.3*     Recent Labs      11/22/17   0343   SODIUM  138   POTASSIUM  3.8   CHLORIDE  103   CO2  27   GLUCOSE  88   BUN  10   CREATININE  0.72   CALCIUM  9.3                      Assessment/Plan     * Community acquired pneumonia- (present on admission)   Assessment & Plan    Unclear pathogen  Very high risk for TB given presentation-- > TB testing NEGATIVE  BAL done 11/19  ID following--> on iv merrem    Await pathology        Anemia- (present on admission)   Assessment & Plan    Iron deficiency    Replace iron po          Sepsis (CMS-HCC)- (present on admission)   Assessment & Plan    Mild/simple, c/w measures            Reviewed items::  Radiology images reviewed, EKG reviewed, Labs reviewed and Medications reviewed  Wilson catheter::  No Wilson  DVT prophylaxis pharmacological::  Not indicated at this time, ambulatory  DVT prophylaxis - mechanical:  SCDs  Ulcer Prophylaxis::  Not indicated  Antibiotics:  Treating active infection/contamination beyond 24 hours perioperative coverage

## 2017-11-23 NOTE — CARE PLAN
Problem: Safety  Goal: Will remain free from falls    Intervention: Assess risk factors for falls  Fall precautions in place. Bed in lowest position. Non-skid socks in place. Personal possessions within reach. Mobility sign on door. Call light within reach. Pt educated regarding fall prevention and states understanding.       Problem: Knowledge Deficit  Goal: Knowledge of disease process/condition, treatment plan, diagnostic tests, and medications will improve  Pt educated regarding plan of care and medications. All questions answered.

## 2017-11-23 NOTE — PROGRESS NOTES
Infectious Disease Progress Note    Author: Nika Rangel M.D. Date & Time of service: 2017  9:34 AM    Chief Complaint:  FU PNA    Interval History:   77 y.o. [place] woman admitted 2017 for pneumonia   AF (99.9) WBC 13.2 minimal cough-denies SE abx   Increased temp 101.5, WBC 14 plan for bronch in am   AF WBC 11.3 cough with clear sputum post-bronch   AF, no CBC, feels better, denies any weakness, some phlegm and improved cough, poor understanding of illness   AF, no CBC, denies any complaints, breathing comfortably on room air, BAL cx +PSAR   AF, WBC 12.6, feels well without any new symptoms, denies cough but sometimes has phlegm    Tmax 100, no CBC, had 2 soft BMs overnight but not watery, tolerating abx without issues, stable on room air  Labs Reviewed, Medications Reviewed and Radiology Reviewed.    Review of Systems:  Review of Systems   Constitutional: Positive for weight loss. Negative for chills and fever.   Respiratory: Positive for sputum production. Negative for shortness of breath.    Gastrointestinal: Negative for abdominal pain, diarrhea, nausea and vomiting.   Neurological: Negative for weakness.       Hemodynamics:  Temp (24hrs), Av.8 °C (98.3 °F), Min:36.5 °C (97.7 °F), Max:37.8 °C (100 °F)  Temperature: 36.6 °C (97.8 °F)  Pulse  Av.3  Min: 72  Max: 134   Blood Pressure : (!) 96/57       Physical Exam:  Physical Exam   Constitutional: She is oriented to person, place, and time. She appears well-developed. No distress.   HENT:   Head: Normocephalic and atraumatic.   Eyes: EOM are normal. Pupils are equal, round, and reactive to light.   Cardiovascular: Normal rate and regular rhythm.    Pulmonary/Chest: Effort normal. No respiratory distress. She has no wheezes. She has rales.   Abdominal: Soft. She exhibits no distension. There is no tenderness.   Musculoskeletal: Normal range of motion.   Neurological: She is alert and oriented to  person, place, and time.   Skin: No rash noted.   Nursing note and vitals reviewed.      Meds:    Current Facility-Administered Medications:   •  ferrous sulfate  •  meropenem (MERREM) IV  •  benzonatate  •  temazepam  •  polyethylene glycol/lytes  •  famotidine  •  hyoscyamine-maalox plus-lidocaine viscous  •  enoxaparin (LOVENOX) injection  •  sucralfate  •  fluticasone  •  loratadine  •  senna-docusate **AND** polyethylene glycol/lytes **AND** magnesium hydroxide **AND** bisacodyl  •  Respiratory Care per Protocol  •  NS  •  acetaminophen  •  labetalol  •  ondansetron  •  ondansetron    Labs:  Recent Labs      11/22/17   0344   WBC  12.6*   RBC  3.62*   HEMOGLOBIN  10.6*   HEMATOCRIT  33.4*   MCV  92.3   MCH  29.3   RDW  43.4   PLATELETCT  717*   MPV  8.3*   NEUTSPOLYS  74.60*   LYMPHOCYTES  15.80*   MONOCYTES  5.30   EOSINOPHILS  2.60   BASOPHILS  0.00   RBCMORPHOLO  Normal     Recent Labs      11/22/17   0343   SODIUM  138   POTASSIUM  3.8   CHLORIDE  103   CO2  27   GLUCOSE  88   BUN  10     Recent Labs      11/22/17   0343   CREATININE  0.72       Imaging:  Ct-chest (thorax) With    Result Date: 11/16/2017 11/16/2017 6:55 PM HISTORY/REASON FOR EXAM:  Shortness of breath fever cough with history of pneumonia TECHNIQUE/EXAM DESCRIPTION: CT scan of the chest with contrast. Thin-section helical images were obtained from the lung apices through the adrenal glands following the bolus administration of 80ml mL of Optiray 350 nonionic contrast. Low dose optimization technique was utilized for this CT exam including automated exposure control and adjustment of the mA and/or kV according to patient size. COMPARISON:  None FINDINGS: Extensive consolidation is noted in the right upper lobe extending to the right lung apex. Surrounding areas of groundglass opacification are noted in the right upper lobe. Additional scattered multifocal irregular shaped and nodular opacifications with ill-defined borders are noted in both  lungs. Bronchiectasis is also present bilaterally. No pleural effusions are identified. There is no pneumothorax. Mild upper mediastinal and pretracheal adenopathy is noted. There is no evidence of pericardial effusion. No large masses are seen within the upper abdomen.     1.  Consolidation is identified which is prominent in the right lung apex. Findings could be due to pneumonia. Tuberculosis is a possibility. 2.  Surrounding groundglass opacification is also noted in the right upper lobe. 3.  Additional scattered multifocal opacifications are noted in each lung. Bronchiectasis is also present. Findings are likely due to infectious or inflammatory process. 4.  Mildly enlarged lymph nodes are noted in the upper mediastinum and pretracheal area.     Dx-chest-2 Views    Result Date: 11/11/2017 11/11/2017 10:22 AM HISTORY/REASON FOR EXAM:  Shortness of Breath Cough, intermittent fever TECHNIQUE/EXAM DESCRIPTION AND NUMBER OF VIEWS: Two views of the chest. COMPARISON:  None available. FINDINGS: Cardiac contour is mildly prominent. Patchy parenchymal and interstitial opacities in both lungs. Focal opacity at the RIGHT lung apex. No pleural fluid collection or pneumothorax. Degenerative change of thoracic spine.     1.  Patchy interstitial and alveolar opacities in both lungs, likely inflammatory. 2.  Focal opacity RIGHT lung apex may represent mass or focal inflammatory process. 3.  Tuberculosis is not excluded.    Dx-chest-portable (1 View)    Result Date: 11/13/2017 11/13/2017 8:14 PM HISTORY/REASON FOR EXAM:  Possible sepsis TECHNIQUE/EXAM DESCRIPTION:  Single AP view of the chest. COMPARISON: Today at 1012 FINDINGS: The cardiac silhouette appears within normal limits. Atherosclerotic calcification of the aorta is noted.  The central pulmonary vasculature appears normal. The lungs appear well expanded bilaterally.  Scattered patchy bilateral pulmonary opacities are seen, greatest in the right apex. No  significant pleural effusions are identified. The bony structures appear age-appropriate.     1.  Patchy bilateral pulmonary opacities, greatest in the right apex, stable since prior, most compatible with infiltrates. Radiographic follow-up to resolution recommended. As previously recommended exclusion of tuberculosis as clinically warranted. 2.  Atherosclerosis      Micro:  Results     Procedure Component Value Units Date/Time    AFB [[id number]] Collected:  11/19/17 0830    Order Status:  Completed Specimen:  Respirate from Bronchoalveolar Lavage Updated:  11/21/17 0841     Significant Indicator NEG     Source RESP     Site BRONCHOALVEOLAR LAVAGE     AFB Culture Culture in progress.     AFB Smear Results No acid fast bacilli seen.    Narrative:       Airborne,[id number] GREEN JIMY L  Which Lobe (Bronch Only):->RUL    RESP CULTURE [[id number]]  (Abnormal) Collected:  11/19/17 0830    Order Status:  Completed Specimen:  Respirate from Bronchoalveolar Lavage Updated:  11/21/17 0841     Gram Stain Result --     Moderate WBCs.  No organisms seen.       Significant Indicator POS (POS)     Source RESP     Site BRONCHOALVEOLAR LAVAGE     Respiratory Culture Rare growth usual upper respiratory janice (A)     Respiratory Culture -- (A)     Pseudomonas aeruginosa  Light growth  See previous culture for sensitivity report.  P.aeruginosa may develop resistance during prolonged therapy  with all antibiotics. Isolates that are initially susceptible  may become resistant within three to four days after  initiation of therapy. Testing of repeat isolates may be  warranted.      Narrative:       Airborne,[id number] GREEN JIMY L  Which Lobe (Bronch Only):->RUL    CULTURE RESPIRATORY W/ GRM STN [[id number]]  (Abnormal)  (Susceptibility) Collected:  11/18/17 0949    Order Status:  Completed Specimen:  Respirate from Sputum Induced Updated:  11/21/17 0828     Gram Stain Result --     Many WBCs.  Many mixed bacteria, no predominant  organism seen.  Few epithelial cells.  Specimen Quality Score: 2+       Significant Indicator POS (POS)     Source RESP     Site SPUTUM INDUCED     Respiratory Culture -- (A)     Moderate growth usual upper respiratory janice  , including  yeast.       Respiratory Culture -- (A)     Pseudomonas aeruginosa  Light growth  The Piperacillin ISA is >16 ug/mL, use Piperacillin 4 Gm q6h  plus synergistic therapy with Tobramycin.  P.aeruginosa may develop resistance during prolonged therapy  with all antibiotics. Isolates that are initially susceptible  may become resistant within three to four days after  initiation of therapy. Testing of repeat isolates may be  warranted.      Narrative:       Airborne,[id number] ALMA ROSA ADHIKARI  For AFB , indiced    Culture & Susceptibility     PSEUDOMONAS AERUGINOSA     Antibiotic Sensitivity Microscan Unit Status    Amikacin Sensitive <=16 mcg/mL Final    Cefepime Resistant >16 mcg/mL Final    Ceftazidime Resistant >16 mcg/mL Final    Ciprofloxacin Sensitive <=1 mcg/mL Final    Gentamicin Sensitive <=4 mcg/mL Final    Imipenem Sensitive <=1 mcg/mL Final    Meropenem Sensitive <=1 mcg/mL Final    Pip/Tazobactam Resistant >64 mcg/mL Final    Tobramycin Sensitive <=4 mcg/mL Final                       ACID FAST STAIN [[id number]] Collected:  11/19/17 0830    Order Status:  Completed Specimen:  Respirate Updated:  11/19/17 2040     Significant Indicator NEG     Source RESP     Site BRONCHOALVEOLAR LAVAGE     AFB Smear Results No acid fast bacilli seen.    Narrative:       Airborne,[id number] GREEN JIMY L  Which Lobe (Bronch Only):->RUL    GRAM STAIN [[id number]] Collected:  11/19/17 0830    Order Status:  Completed Specimen:  Respirate Updated:  11/19/17 2040     Significant Indicator .     Source RESP     Site BRONCHOALVEOLAR LAVAGE     Gram Stain Result --     Moderate WBCs.  No organisms seen.      Narrative:       Airborne,[id number] GREEN JIMY L  Which Lobe (Bronch  "Only):->RUL    BLOOD CULTURE [[id number]] Collected:  11/18/17 1927    Order Status:  Completed Specimen:  Blood from Peripheral Updated:  11/19/17 0901     Significant Indicator NEG     Source BLD     Site PERIPHERAL     Blood Culture --     No Growth    Note: Blood cultures are incubated for 5 days and  are monitored continuously.Positive blood cultures  are called to the RN and reported as soon as  they are identified.      Narrative:       Airborne,Droplet  Per Hospital Policy: Only change Specimen Src: to \"Line\" if  specified by physician order.    BLOOD CULTURE [[id number]] Collected:  11/18/17 1928    Order Status:  Completed Specimen:  Blood from Peripheral Updated:  11/19/17 0901     Significant Indicator NEG     Source BLD     Site PERIPHERAL     Blood Culture --     No Growth    Note: Blood cultures are incubated for 5 days and  are monitored continuously.Positive blood cultures  are called to the RN and reported as soon as  they are identified.      Narrative:       Airborne,Droplet  Per Hospital Policy: Only change Specimen Src: to \"Line\" if  specified by physician order.    BLOOD CULTURE [[id number]] Collected:  11/13/17 2030    Order Status:  Completed Specimen:  Blood from Peripheral Updated:  11/18/17 2300     Significant Indicator NEG     Source BLD     Site PERIPHERAL     Blood Culture No growth after 5 days of incubation.    Narrative:       Per Hospital Policy: Only change Specimen Src: to \"Line\" if  specified by physician order.    GRAM STAIN [[id number]] Collected:  11/18/17 0949    Order Status:  Completed Specimen:  Respirate Updated:  11/18/17 2119     Significant Indicator .     Source RESP     Site SPUTUM INDUCED     Gram Stain Result --     Many WBCs.  Many mixed bacteria, no predominant organism seen.  Few epithelial cells.  Specimen Quality Score: 2+      Narrative:       Airborne,[id number] ALMA ROSA ADHIKARI  For AFB , indiced    BLOOD CULTURE [[id number]] Collected:  11/13/17 1740    " "Order Status:  Completed Specimen:  Blood from Peripheral Updated:  11/18/17 1900     Significant Indicator NEG     Source BLD     Site PERIPHERAL     Blood Culture No growth after 5 days of incubation.    Narrative:       Per Hospital Policy: Only change Specimen Src: to \"Line\" if  specified by physician order.    CULTURE RESPIRATORY W/ GRM STN [[id number]] Collected:  11/18/17 0949    Order Status:  Canceled Specimen:  Respirate from Sputum Induced     CULTURE RESPIRATORY W/ GRM STN [[id number]] Collected:  11/18/17 0949    Order Status:  Canceled Specimen:  Respirate from Sputum Induced     AFB CULTURE [[id number]] Collected:  11/16/17 0330    Order Status:  Completed Specimen:  Respirate from Sputum Induced Updated:  11/16/17 1606     Significant Indicator NEG     Source RESP     Site SPUTUM INDUCED     AFB Culture Culture in progress.     AFB Smear Results No acid fast bacilli seen.    Narrative:       Airborne,[id number] GABBY MEZA    ACID FAST STAIN [[id number]] Collected:  11/16/17 0330    Order Status:  Completed Specimen:  Respirate Updated:  11/16/17 1606     Significant Indicator NEG     Source RESP     Site SPUTUM INDUCED     AFB Smear Results No acid fast bacilli seen.    Narrative:       Airborne,[id number] GABBY MEZA          Assessment:  Active Hospital Problems    Diagnosis   • Community acquired pneumonia [J18.9]   • Anemia [D64.9]   • Sepsis (CMS-HCC) [A41.9]     Plan:  Pneumonia-concern for TB  Fever resolved  + leukocytosis persistent but decreased overall  History, clinical course, and CXR concerning for TB - but neg AFB  TB quant - neg  CT chest c/w TB-consolidation RUL +LAD  Appreciate pulm eval  S/p bronch on 11/19  BAL cx - neg for AFB  Sputum AFB x 3 negative  Blood cxs 11/18 neg  BAL cx + MDR PSAR (R-zosyn)  Transition to meropenem  Anticipate 2 week course from 11/21  May be able to transition to PO cipro  Path - pending    Thrombocytosis  Acute phase " reactant  Should resolve with treatment infection    Sepsis (CMS-HCC)- (present on admission)  Abx as above  Blood cxs previously neg  Ucx neg    Plan of care discussed with pulm/Dr. Marroquin

## 2017-11-24 VITALS
OXYGEN SATURATION: 94 % | TEMPERATURE: 97.2 F | BODY MASS INDEX: 21.53 KG/M2 | RESPIRATION RATE: 14 BRPM | WEIGHT: 93.03 LBS | SYSTOLIC BLOOD PRESSURE: 111 MMHG | HEART RATE: 78 BPM | HEIGHT: 55 IN | DIASTOLIC BLOOD PRESSURE: 64 MMHG

## 2017-11-24 PROBLEM — J18.9 COMMUNITY ACQUIRED PNEUMONIA: Status: RESOLVED | Noted: 2017-11-13 | Resolved: 2017-11-24

## 2017-11-24 PROBLEM — A41.9 SEPSIS (HCC): Status: RESOLVED | Noted: 2017-11-13 | Resolved: 2017-11-24

## 2017-11-24 PROCEDURE — A9270 NON-COVERED ITEM OR SERVICE: HCPCS | Performed by: HOSPITALIST

## 2017-11-24 PROCEDURE — A9270 NON-COVERED ITEM OR SERVICE: HCPCS | Performed by: INTERNAL MEDICINE

## 2017-11-24 PROCEDURE — 700105 HCHG RX REV CODE 258: Performed by: INTERNAL MEDICINE

## 2017-11-24 PROCEDURE — 700102 HCHG RX REV CODE 250 W/ 637 OVERRIDE(OP): Performed by: HOSPITALIST

## 2017-11-24 PROCEDURE — 700111 HCHG RX REV CODE 636 W/ 250 OVERRIDE (IP): Performed by: HOSPITALIST

## 2017-11-24 PROCEDURE — 700111 HCHG RX REV CODE 636 W/ 250 OVERRIDE (IP): Performed by: INTERNAL MEDICINE

## 2017-11-24 PROCEDURE — 99239 HOSP IP/OBS DSCHRG MGMT >30: CPT | Performed by: HOSPITALIST

## 2017-11-24 PROCEDURE — 700102 HCHG RX REV CODE 250 W/ 637 OVERRIDE(OP): Performed by: INTERNAL MEDICINE

## 2017-11-24 RX ORDER — FERROUS SULFATE 325(65) MG
325 TABLET ORAL 2 TIMES DAILY WITH MEALS
Qty: 60 TAB | Refills: 3 | Status: SHIPPED | OUTPATIENT
Start: 2017-11-24 | End: 2018-04-05

## 2017-11-24 RX ORDER — CIPROFLOXACIN 500 MG/1
500 TABLET, FILM COATED ORAL 2 TIMES DAILY
Qty: 20 TAB | Refills: 0 | Status: SHIPPED | OUTPATIENT
Start: 2017-11-24 | End: 2018-01-04

## 2017-11-24 RX ADMIN — SUCRALFATE 1 G: 1 TABLET ORAL at 12:49

## 2017-11-24 RX ADMIN — LORATADINE 10 MG: 10 TABLET ORAL at 09:26

## 2017-11-24 RX ADMIN — Medication 325 MG: at 09:26

## 2017-11-24 RX ADMIN — FAMOTIDINE 20 MG: 20 TABLET, FILM COATED ORAL at 09:26

## 2017-11-24 RX ADMIN — MEROPENEM 1 G: 1 INJECTION, POWDER, FOR SOLUTION INTRAVENOUS at 05:49

## 2017-11-24 RX ADMIN — POLYETHYLENE GLYCOL 3350 1 PACKET: 17 POWDER, FOR SOLUTION ORAL at 09:27

## 2017-11-24 RX ADMIN — SUCRALFATE 1 G: 1 TABLET ORAL at 05:49

## 2017-11-24 RX ADMIN — FLUTICASONE PROPIONATE 50 MCG: 50 SPRAY, METERED NASAL at 09:27

## 2017-11-24 RX ADMIN — MEROPENEM 1 G: 1 INJECTION, POWDER, FOR SOLUTION INTRAVENOUS at 12:49

## 2017-11-24 RX ADMIN — ENOXAPARIN SODIUM 30 MG: 100 INJECTION SUBCUTANEOUS at 09:26

## 2017-11-24 ASSESSMENT — ENCOUNTER SYMPTOMS
NAUSEA: 0
VOMITING: 0
WEIGHT LOSS: 1
WEAKNESS: 0
ABDOMINAL PAIN: 0
FEVER: 0
DIARRHEA: 0
CHILLS: 0
SPUTUM PRODUCTION: 1
SHORTNESS OF BREATH: 0

## 2017-11-24 ASSESSMENT — PAIN SCALES - GENERAL
PAINLEVEL_OUTOF10: 0

## 2017-11-24 NOTE — DISCHARGE INSTRUCTIONS
Discharge Instructions    Discharged to home by car with relative. Discharged via wheelchair, hospital escort: Yes.  Special equipment needed: Not Applicable    Be sure to schedule a follow-up appointment with your primary care doctor or any specialists as instructed.     Discharge Plan:   Diet Plan: Discussed  Activity Level: Discussed  Confirmed Follow up Appointment: Patient to Call and Schedule Appointment  Confirmed Symptoms Management: Discussed  Medication Reconciliation Updated: Yes  Pneumococcal Vaccine Given - only chart on this line when given: Given (See MAR)  Influenza Vaccine Indication: Not indicated: Previously immunized this influenza season and > 8 years of age    I understand that a diet low in cholesterol, fat, and sodium is recommended for good health. Unless I have been given specific instructions below for another diet, I accept this instruction as my diet prescription.   Other diet: heart healthy    Special Instructions: None    · Is patient discharged on Warfarin / Coumadin?   No     · Is patient Post Blood Transfusion?  No    Depression / Suicide Risk    As you are discharged from this [facility] facility, it is important to learn how to keep safe from harming yourself.    Recognize the warning signs:  · Abrupt changes in personality, positive or negative- including increase in energy   · Giving away possessions  · Change in eating patterns- significant weight changes-  positive or negative  · Change in sleeping patterns- unable to sleep or sleeping all the time   · Unwillingness or inability to communicate  · Depression  · Unusual sadness, discouragement and loneliness  · Talk of wanting to die  · Neglect of personal appearance   · Rebelliousness- reckless behavior  · Withdrawal from people/activities they love  · Confusion- inability to concentrate     If you or a loved one observes any of these behaviors or has concerns about self-harm, here's what you can do:  · Talk about it- your  feelings and reasons for harming yourself  · Remove any means that you might use to hurt yourself (examples: pills, rope, extension cords, firearm)  · Get professional help from the community (Mental Health, Substance Abuse, psychological counseling)  · Do not be alone:Call your Safe Contact- someone whom you trust who will be there for you.  · Call your local CRISIS HOTLINE 685-8328 or 451-774-3934  · Call your local Children's Mobile Crisis Response Team [place] (636) 247-8272 or www.[employer]  · Call the toll free National Suicide Prevention Hotlines   · National Suicide Prevention Lifeline 016-871-TKYO (3320)  · National Hope Line Network 800-SUICIDE (642-7821)

## 2017-11-24 NOTE — PROGRESS NOTES
Infectious Disease Progress Note    Author: Nika Rangel M.D. Date & Time of service: 2017  9:00 AM    Chief Complaint:  FU PNA    Interval History:   77 y.o. [place] woman admitted 2017 for pneumonia   AF (99.9) WBC 13.2 minimal cough-denies SE abx   Increased temp 101.5, WBC 14 plan for bronch in am   AF WBC 11.3 cough with clear sputum post-bronch   AF, no CBC, feels better, denies any weakness, some phlegm and improved cough, poor understanding of illness   AF, no CBC, denies any complaints, breathing comfortably on room air, BAL cx +PSAR   AF, WBC 12.6, feels well without any new symptoms, denies cough but sometimes has phlegm    Tmax 100, no CBC, had 2 soft BMs overnight but not watery, tolerating abx without issues, stable on room air   AF, no CBC, sitting up in chair watching TV without any complaints, had BM yesterday, path still pending  Labs Reviewed, Medications Reviewed and Radiology Reviewed.    Review of Systems:  Review of Systems   Constitutional: Positive for weight loss. Negative for chills and fever.   Respiratory: Positive for sputum production. Negative for shortness of breath.    Gastrointestinal: Negative for abdominal pain, diarrhea, nausea and vomiting.   Neurological: Negative for weakness.       Hemodynamics:  Temp (24hrs), Av.3 °C (97.4 °F), Min:36.1 °C (96.9 °F), Max:36.6 °C (97.9 °F)  Temperature: 36.6 °C (97.8 °F)  Pulse  Av.7  Min: 69  Max: 134   Blood Pressure : 119/71       Physical Exam:  Physical Exam   Constitutional: She is oriented to person, place, and time. She appears well-developed. No distress.   Thin   HENT:   Head: Normocephalic and atraumatic.   Eyes: EOM are normal. Pupils are equal, round, and reactive to light.   Cardiovascular: Normal rate and regular rhythm.    Pulmonary/Chest: Effort normal. No respiratory distress. She has no wheezes. She has rales.   Abdominal: Soft. She exhibits no distension.  There is no tenderness.   Musculoskeletal: Normal range of motion.   Neurological: She is alert and oriented to person, place, and time.   Skin: No rash noted.   Nursing note and vitals reviewed.      Meds:    Current Facility-Administered Medications:   •  ferrous sulfate  •  meropenem (MERREM) IV  •  benzonatate  •  temazepam  •  polyethylene glycol/lytes  •  famotidine  •  hyoscyamine-maalox plus-lidocaine viscous  •  enoxaparin (LOVENOX) injection  •  sucralfate  •  fluticasone  •  loratadine  •  senna-docusate **AND** polyethylene glycol/lytes **AND** magnesium hydroxide **AND** bisacodyl  •  Respiratory Care per Protocol  •  NS  •  acetaminophen  •  labetalol  •  ondansetron  •  ondansetron    Labs:  Recent Labs      11/22/17   0344   WBC  12.6*   RBC  3.62*   HEMOGLOBIN  10.6*   HEMATOCRIT  33.4*   MCV  92.3   MCH  29.3   RDW  43.4   PLATELETCT  717*   MPV  8.3*   NEUTSPOLYS  74.60*   LYMPHOCYTES  15.80*   MONOCYTES  5.30   EOSINOPHILS  2.60   BASOPHILS  0.00   RBCMORPHOLO  Normal     Recent Labs      11/22/17   0343   SODIUM  138   POTASSIUM  3.8   CHLORIDE  103   CO2  27   GLUCOSE  88   BUN  10     Recent Labs      11/22/17   0343   CREATININE  0.72       Imaging:  Ct-chest (thorax) With    Result Date: 11/16/2017 11/16/2017 6:55 PM HISTORY/REASON FOR EXAM:  Shortness of breath fever cough with history of pneumonia TECHNIQUE/EXAM DESCRIPTION: CT scan of the chest with contrast. Thin-section helical images were obtained from the lung apices through the adrenal glands following the bolus administration of 80ml mL of Optiray 350 nonionic contrast. Low dose optimization technique was utilized for this CT exam including automated exposure control and adjustment of the mA and/or kV according to patient size. COMPARISON:  None FINDINGS: Extensive consolidation is noted in the right upper lobe extending to the right lung apex. Surrounding areas of groundglass opacification are noted in the right upper lobe.  Additional scattered multifocal irregular shaped and nodular opacifications with ill-defined borders are noted in both lungs. Bronchiectasis is also present bilaterally. No pleural effusions are identified. There is no pneumothorax. Mild upper mediastinal and pretracheal adenopathy is noted. There is no evidence of pericardial effusion. No large masses are seen within the upper abdomen.     1.  Consolidation is identified which is prominent in the right lung apex. Findings could be due to pneumonia. Tuberculosis is a possibility. 2.  Surrounding groundglass opacification is also noted in the right upper lobe. 3.  Additional scattered multifocal opacifications are noted in each lung. Bronchiectasis is also present. Findings are likely due to infectious or inflammatory process. 4.  Mildly enlarged lymph nodes are noted in the upper mediastinum and pretracheal area.     Dx-chest-2 Views    Result Date: 11/11/2017 11/11/2017 10:22 AM HISTORY/REASON FOR EXAM:  Shortness of Breath Cough, intermittent fever TECHNIQUE/EXAM DESCRIPTION AND NUMBER OF VIEWS: Two views of the chest. COMPARISON:  None available. FINDINGS: Cardiac contour is mildly prominent. Patchy parenchymal and interstitial opacities in both lungs. Focal opacity at the RIGHT lung apex. No pleural fluid collection or pneumothorax. Degenerative change of thoracic spine.     1.  Patchy interstitial and alveolar opacities in both lungs, likely inflammatory. 2.  Focal opacity RIGHT lung apex may represent mass or focal inflammatory process. 3.  Tuberculosis is not excluded.    Dx-chest-portable (1 View)    Result Date: 11/13/2017 11/13/2017 8:14 PM HISTORY/REASON FOR EXAM:  Possible sepsis TECHNIQUE/EXAM DESCRIPTION:  Single AP view of the chest. COMPARISON: Today at 1012 FINDINGS: The cardiac silhouette appears within normal limits. Atherosclerotic calcification of the aorta is noted.  The central pulmonary vasculature appears normal. The lungs appear well  "expanded bilaterally.  Scattered patchy bilateral pulmonary opacities are seen, greatest in the right apex. No significant pleural effusions are identified. The bony structures appear age-appropriate.     1.  Patchy bilateral pulmonary opacities, greatest in the right apex, stable since prior, most compatible with infiltrates. Radiographic follow-up to resolution recommended. As previously recommended exclusion of tuberculosis as clinically warranted. 2.  Atherosclerosis      Micro:  Results     Procedure Component Value Units Date/Time    BLOOD CULTURE [[id number]] Collected:  11/18/17 1927    Order Status:  Completed Specimen:  Blood from Peripheral Updated:  11/23/17 2100     Significant Indicator NEG     Source BLD     Site PERIPHERAL     Blood Culture No growth after 5 days of incubation.    Narrative:       Airborne,Droplet  Per Hospital Policy: Only change Specimen Src: to \"Line\" if  specified by physician order.    BLOOD CULTURE [[id number]] Collected:  11/18/17 1928    Order Status:  Completed Specimen:  Blood from Peripheral Updated:  11/23/17 2100     Significant Indicator NEG     Source BLD     Site PERIPHERAL     Blood Culture No growth after 5 days of incubation.    Narrative:       Airborne,Droplet  Per Hospital Policy: Only change Specimen Src: to \"Line\" if  specified by physician order.    AFB [[id number]] Collected:  11/19/17 0830    Order Status:  Completed Specimen:  Respirate from Bronchoalveolar Lavage Updated:  11/21/17 0841     Significant Indicator NEG     Source RESP     Site BRONCHOALVEOLAR LAVAGE     AFB Culture Culture in progress.     AFB Smear Results No acid fast bacilli seen.    Narrative:       Airborne,[id number] BUCKY ANDREWS  Which Lobe (Bronch Only):->RUL    RESP CULTURE [[id number]]  (Abnormal) Collected:  11/19/17 0830    Order Status:  Completed Specimen:  Respirate from Bronchoalveolar Lavage Updated:  11/21/17 0841     Gram Stain Result --     Moderate WBCs.  No organisms " seen.       Significant Indicator POS (POS)     Source RESP     Site BRONCHOALVEOLAR LAVAGE     Respiratory Culture Rare growth usual upper respiratory janice (A)     Respiratory Culture -- (A)     Pseudomonas aeruginosa  Light growth  See previous culture for sensitivity report.  P.aeruginosa may develop resistance during prolonged therapy  with all antibiotics. Isolates that are initially susceptible  may become resistant within three to four days after  initiation of therapy. Testing of repeat isolates may be  warranted.      Narrative:       Airborne,[id number] BUCKY ANDREWS  Which Lobe (Bronch Only):->RUL    CULTURE RESPIRATORY W/ GRM STN [[id number]]  (Abnormal)  (Susceptibility) Collected:  11/18/17 0949    Order Status:  Completed Specimen:  Respirate from Sputum Induced Updated:  11/21/17 0828     Gram Stain Result --     Many WBCs.  Many mixed bacteria, no predominant organism seen.  Few epithelial cells.  Specimen Quality Score: 2+       Significant Indicator POS (POS)     Source RESP     Site SPUTUM INDUCED     Respiratory Culture -- (A)     Moderate growth usual upper respiratory janice  , including  yeast.       Respiratory Culture -- (A)     Pseudomonas aeruginosa  Light growth  The Piperacillin ISA is >16 ug/mL, use Piperacillin 4 Gm q6h  plus synergistic therapy with Tobramycin.  P.aeruginosa may develop resistance during prolonged therapy  with all antibiotics. Isolates that are initially susceptible  may become resistant within three to four days after  initiation of therapy. Testing of repeat isolates may be  warranted.      Narrative:       Airborne,[id number] ALMA ROSA ADHIKARI  For AFB , indiced    Culture & Susceptibility     PSEUDOMONAS AERUGINOSA     Antibiotic Sensitivity Microscan Unit Status    Amikacin Sensitive <=16 mcg/mL Final    Cefepime Resistant >16 mcg/mL Final    Ceftazidime Resistant >16 mcg/mL Final    Ciprofloxacin Sensitive <=1 mcg/mL Final    Gentamicin Sensitive <=4  "mcg/mL Final    Imipenem Sensitive <=1 mcg/mL Final    Meropenem Sensitive <=1 mcg/mL Final    Pip/Tazobactam Resistant >64 mcg/mL Final    Tobramycin Sensitive <=4 mcg/mL Final                       ACID FAST STAIN [[id number]] Collected:  11/19/17 0830    Order Status:  Completed Specimen:  Respirate Updated:  11/19/17 2040     Significant Indicator NEG     Source RESP     Site BRONCHOALVEOLAR LAVAGE     AFB Smear Results No acid fast bacilli seen.    Narrative:       Airborne,[id number] GREEN JIMY L  Which Lobe (Bronch Only):->RUL    GRAM STAIN [[id number]] Collected:  11/19/17 0830    Order Status:  Completed Specimen:  Respirate Updated:  11/19/17 2040     Significant Indicator .     Source RESP     Site BRONCHOALVEOLAR LAVAGE     Gram Stain Result --     Moderate WBCs.  No organisms seen.      Narrative:       Airborne,[id number] GREEN JIMY L  Which Lobe (Bronch Only):->RUL    BLOOD CULTURE [[id number]] Collected:  11/13/17 2030    Order Status:  Completed Specimen:  Blood from Peripheral Updated:  11/18/17 2300     Significant Indicator NEG     Source BLD     Site PERIPHERAL     Blood Culture No growth after 5 days of incubation.    Narrative:       Per Hospital Policy: Only change Specimen Src: to \"Line\" if  specified by physician order.    GRAM STAIN [[id number]] Collected:  11/18/17 0949    Order Status:  Completed Specimen:  Respirate Updated:  11/18/17 2119     Significant Indicator .     Source RESP     Site SPUTUM INDUCED     Gram Stain Result --     Many WBCs.  Many mixed bacteria, no predominant organism seen.  Few epithelial cells.  Specimen Quality Score: 2+      Narrative:       Airborne,[id number] ALMA ROSA ADHIKARI  For AFB , indiced    BLOOD CULTURE [[id number]] Collected:  11/13/17 1740    Order Status:  Completed Specimen:  Blood from Peripheral Updated:  11/18/17 1900     Significant Indicator NEG     Source BLD     Site PERIPHERAL     Blood Culture No growth after 5 days of " "incubation.    Narrative:       Per Hospital Policy: Only change Specimen Src: to \"Line\" if  specified by physician order.    CULTURE RESPIRATORY W/ GRM STN [[id number]] Collected:  11/18/17 0949    Order Status:  Canceled Specimen:  Respirate from Sputum Induced     CULTURE RESPIRATORY W/ GRM STN [[id number]] Collected:  11/18/17 0949    Order Status:  Canceled Specimen:  Respirate from Sputum Induced           Assessment:  Active Hospital Problems    Diagnosis   • Community acquired pneumonia [J18.9]   • Anemia [D64.9]   • Sepsis (CMS-[facility]) [A41.9]     Plan:  Pneumonia-concern for TB  Fever resolved  + leukocytosis persistent but decreased overall  History, clinical course, and CXR concerning for TB - but neg AFB  TB quant - neg  CT chest c/w TB-consolidation RUL +LAD  Appreciate pulm eval  S/p bronch on 11/19  BAL cx - neg for AFB  Sputum AFB x 3 negative  Blood cxs 11/18 neg  BAL cx + MDR PSAR (R-zosyn)  Transition to meropenem  Anticipate 2 week course from 11/21  Stop date 12/05/17  Can transition to PO cipro at discharge  Path - pending    Thrombocytosis  Acute phase reactant  Should resolve with treatment infection    Sepsis (CMS-HCC)- (present on admission)  Abx as above  Blood cxs previously neg  Ucx neg    Plan of care discussed with pulm/Dr. Marroquin      "

## 2017-11-24 NOTE — CARE PLAN
Problem: Nutritional:  Goal: Achieve adequate nutritional intake  Patient will consume >50% of meals   Outcome: MET Date Met: 11/24/17  Per chart review, pt PO % most meals.

## 2017-11-24 NOTE — PROGRESS NOTES
Pt sitting at bedside watching TV. Bedside shift report completed, POC discussed with dayshift RN. Pt verbalizes understanding of POC. Bed low and locked, call light in reach. No needs at this time.

## 2017-11-24 NOTE — CARE PLAN
Problem: Venous Thromboembolism (VTW)/Deep Vein Thrombosis (DVT) Prevention:  Goal: Patient will participate in Venous Thrombosis (VTE)/Deep Vein Thrombosis (DVT)Prevention Measures  Pt taking lovenox for VTE prophylaxis, Pt verbalizes understanding for need.    Problem: Bowel/Gastric:  Goal: Will not experience complications related to bowel motility  Outcome: PROGRESSING AS EXPECTED  Pt states she has been having regular bowel movements. Pt verbalizes understanding for need to maintain bowel motility.

## 2017-11-24 NOTE — PROGRESS NOTES
Vicky Chen Fall Risk Assessment:     Last Known Fall: Within the last six months  Mobility: No limitations  Medications: Cardiovascular or central nervous system meds  Mental Status/LOC/Awareness: Awake, alert, and oriented to date, place, and person  Toileting Needs: No needs  Volume/Electrolyte Status: No problems  Communication/Sensory: Visual (Glasses)/hearing deficit  Behavior: Appropriate behavior  Vicky Chen Fall Risk Total: 7  Fall Risk Level: LOW RISK     Universal Fall Precautions:  bed in low position and locked, call light/belongings in reach, wheelchairs and assistive devices out of sight, siderails up x 2, use non-slip footwear, adequate lighting, clutter free and spill free environment, educate on level of risk, educate to call for assistance     Fall Risk Level Interventions:   TRIAL ([employer], NEURO, MED EMIL 5) Low Fall Risk Interventions  Place yellow fall risk ID band on patient: verified  Provide patient/family education based on risk assessment: completed  Educate patient/family to call staff for assistance when getting out of bed: completed  Place fall precaution signage outside patient door: verifiedTRIAL ([employer], NEURO, MED EMIL 5) Moderate Fall Risk Interventions  Place yellow fall risk ID band on patient: verified  Provide patient/family education based on risk assessment : verified  Educate patient/family to call staff for assistance when getting out of bed: verified  Place fall precaution signage outside patient door: verified  Utilize bed/chair fall alarm: verified      Patient Specific Interventions:      Medication: review medications with patient and family, assess for medications that can be discontinued or dosage decreased and limit combination of prn medications  Mental Status/LOC/Awareness: reinforce falls education, check on patient hourly and reinforce the use of call light  Toileting: provide frquent toileting, instruct patient/family on the use of grab bars and instruct  patient/family on the need to call for assistance when toileting  Volume/Electrolyte Status: ensure patient remains hydrated and monitor abnormal lab values  Communication/Sensory: update plan of care on whiteboard, ensure proper positioning when transferrng/ambulating and ensure patient has glasses/contacts and hearing aids/dentures  Behavioral: encourage patient to voice feelings, engage patient in daily activities, administer medication as ordered and instruct/reinforce fall program rationale  Mobility: schedule physical activity throughout the day, provide comfort measures during transport, dangle prior to standing, ensure bed is locked and in lowest position, provide appropriate assistive device and instruct patient to exit bed on their strongest side

## 2017-11-25 NOTE — DISCHARGE SUMMARY
CHIEF COMPLAINT ON ADMISSION  Chief Complaint   Patient presents with   • Cough   • Shortness of Breath       CODE STATUS  Prior    HPI & HOSPITAL COURSE  HISTORY OF PRESENT ILLNESS:  This is a 77-year-old female without much past   medical history except for allergies.  She was vacationing in the [facility]   from 05/2017 to 10/2017, and in September while in the [facility], she started to feel ill with   intermittent low grade fever and chills, along with dry cough.  She also   started to notice intermittent shortness of breath.  She denied any night   sweats.  She sought medical attention there, where chest x-ray and sputum AFB   were obtained, which were reportedly both negative.  Her symptoms were felt to be   allergies and she was started on antihistamines.  She came back to the [place] in   October, but she continued to have above-mentioned symptoms, with intermittent   fevers and chills, with cough becoming productive of white to greenish phlegm   without any blood.  She also continued to feel short of breath, but not any   worse than when it first started.  She also complained of chest pain with   coughing.  She denied any nausea, vomiting, diarrhea, or leg edema.  She did   have intermittent epigastric pain.  During that time, her appetite was down,   and she was not eating or drinking much.     She was brought by her daughter to an urgent care center last Saturday, and   chest x-ray apparently showed pneumonia.  She was started on oral doxycycline.    However, her symptoms persisted, and she felt more fatigued prompting her daughter to   bring her to the ED for further evaluation and management.       There was an initial concern for possible TB or atypical mycobacterium. Pulmonary and ID was consulted and this was ruled out. She received broad spectrum iv antibiotics and improved every day    Cultures from BAL shows pseudomonas      She needs po cipro for 2 weeks after discharge    Therefore, she is  discharged in good and stable condition with close outpatient follow-up.    SPECIFIC OUTPATIENT FOLLOW-UP  pcp 1 week    DISCHARGE PROBLEM LIST  Principal Problem (Resolved):    Community acquired pneumonia POA: Yes  Active Problems:    Anemia POA: Yes  Resolved Problems:    Sepsis (CMS-HCC) POA: Yes      FOLLOW UP  Future Appointments  Date Time Provider Department Center   11/29/2017 9:00 AM Poppy Freeman M.D. [facility] None     [facility]  [address]  [facility] [address]  [phone]    Schedule an appointment as soon as possible for a visit in 2 weeks        MEDICATIONS ON DISCHARGE   Irina Conrad   Home Medication Instructions CHACORTA:[id number]    Printed on:11/25/17 8242   Medication Information                      ciprofloxacin (CIPRO) 500 MG Tab  Take 1 Tab by mouth 2 times a day.             ferrous sulfate 325 (65 Fe) MG tablet  Take 1 Tab by mouth 2 times a day, with meals.             fluticasone (FLONASE) 50 MCG/ACT nasal spray  Spray 1 Spray in nose every day.             loratadine (CLARITIN) 10 MG Tab  Take 1 Tab by mouth every day.                 DIET  No orders of the defined types were placed in this encounter.      ACTIVITY  As tolerated.  Weight bearing as tolerated      CONSULTATIONS  pma dr diane thao ID    PROCEDURES  Component   Gram Stain Result   Moderate WBCs.   No organisms seen.    Significant Indicator  (Positive)   POS (POS)   Source   RESP   Site   BRONCHOALVEOLAR LAVAGE   Respiratory Culture  (Abnormal)   Rare growth usual upper respiratory janice    Respiratory Culture  (Abnormal)   Pseudomonas aeruginosa   Light growth   See previous culture for sensitivity report.   P.aeruginosa may develop resistance during prolonged therapy   with all antibiotics. Isolates that are initially susceptible   may become resistant within three to four days after   initiation of therapy. Testing of repeat isolates may be   warranted.            LABORATORY  Lab  Results   Component Value Date/Time    SODIUM 138 11/22/2017 03:43 AM    POTASSIUM 3.8 11/22/2017 03:43 AM    CHLORIDE 103 11/22/2017 03:43 AM    CO2 27 11/22/2017 03:43 AM    GLUCOSE 88 11/22/2017 03:43 AM    BUN 10 11/22/2017 03:43 AM    CREATININE 0.72 11/22/2017 03:43 AM        Lab Results   Component Value Date/Time    WBC 12.6 (H) 11/22/2017 03:44 AM    HEMOGLOBIN 10.6 (L) 11/22/2017 03:44 AM    HEMATOCRIT 33.4 (L) 11/22/2017 03:44 AM    PLATELETCT 717 (H) 11/22/2017 03:44 AM        Total time of the discharge process exceeds 38 minutes

## 2017-11-29 ENCOUNTER — OFFICE VISIT (OUTPATIENT)
Dept: MEDICAL GROUP | Facility: PHYSICIAN GROUP | Age: 77
End: 2017-11-29
Payer: MEDICARE

## 2017-11-29 VITALS
HEIGHT: 56 IN | HEART RATE: 81 BPM | BODY MASS INDEX: 20.88 KG/M2 | SYSTOLIC BLOOD PRESSURE: 120 MMHG | OXYGEN SATURATION: 92 % | TEMPERATURE: 97.5 F | DIASTOLIC BLOOD PRESSURE: 60 MMHG | WEIGHT: 92.81 LBS

## 2017-11-29 DIAGNOSIS — D50.9 IRON DEFICIENCY ANEMIA, UNSPECIFIED IRON DEFICIENCY ANEMIA TYPE: ICD-10-CM

## 2017-11-29 DIAGNOSIS — J15.1 PNEUMONIA OF RIGHT UPPER LOBE DUE TO PSEUDOMONAS SPECIES (HCC): ICD-10-CM

## 2017-11-29 PROCEDURE — 99214 OFFICE O/P EST MOD 30 MIN: CPT | Performed by: FAMILY MEDICINE

## 2017-11-30 NOTE — PROGRESS NOTES
Subjective:      Irina Conrad is a 77 y.o. female who presents with Establish Care            HPI     This is a 77-year-old Malagasy female who is here to establish with me. She was previously seen by MATT Waller, and ago. At that time she was complaining of chronic cough. She was treated for possible allergies. She went to urgent care on 11/11/17 and she was diagnosed with pneumonia with x-ray confirmation. She was treated with doxycycline which did not help and so she went to the ER and was admitted from 11/13-24/2017. Chest x-ray showed right lung apex opacity. CAT scan showed consolidation right upper lung apex, groundglass opacification right upper lobe, scattered multifocal opacifications in each lung. She had extensive workup including bronchoscopy, sputum cultures. There was no malignancy. She was negative for AFB. Her sputum cultures came back Pseudomonas. She was treated appropriately with the right antibiotics and was sent home on by mouth Cipro to be taken for 2 more weeks. She will be done with antibiotics next week. She said the cough is now completely gone. She is back to her baseline.    She was also found to have iron deficiency anemia with hemoglobin of 9 and hematocrit of 27. Iron was low and she was discharged on ferrous sulfate 325 mg one tablet twice daily which she has been taking.    She is otherwise healthy without significant medical problems prior to the above illness.    I reviewed the following    Past Medical History:   Diagnosis Date   • Chronic cough     worse since Sept 2017   • Falls    • Knee pain, right         Past Surgical History:   Procedure Laterality Date   • BRONCHOSCOPY-ENDO N/A 11/19/2017    Procedure: BRONCHOSCOPY-ENDO;  Surgeon: Chriss Goldberg M.D.;  Location: ENDOSCOPY [facility];  Service: Ent       Allergies   Allergen Reactions   • Chocolate Shortness of Breath   • Chocolate Flavor Shortness of Breath       Current Outpatient  Prescriptions   Medication Sig Dispense Refill   • ferrous sulfate 325 (65 Fe) MG tablet Take 1 Tab by mouth 2 times a day, with meals. 60 Tab 3   • ciprofloxacin (CIPRO) 500 MG Tab Take 1 Tab by mouth 2 times a day. 20 Tab 0     No current facility-administered medications for this visit.         Family History   Problem Relation Age of Onset   • Asthma Father    • No Known Problems Sister        Social History     Social History   • Marital status:      Spouse name: N/A   • Number of children: N/A   • Years of education: N/A     Occupational History   • cafe in [facility]    • originally from the [facility]      here since October 2017     Social History Main Topics   • Smoking status: Former Smoker     Quit date: 11/29/1986   • Smokeless tobacco: Never Used      Comment: smoked during first trimester of prenancy   • Alcohol use No   • Drug use: No   • Sexual activity: Not Currently     Other Topics Concern   • Not on file     Social History Narrative   • No narrative on file      ROS     Review of Systems  Constitutional: Negative for fever, chills, weight loss and malaise/fatigue.   HEENT: Negative for ear pain, nosebleeds, congestion, sore throat and neck pain.    Eyes: Negative for blurred vision.   Respiratory: Negative for cough, sputum production, shortness of breath and wheezing.    Cardiovascular: Negative for chest pain, palpitations, orthopnea and leg swelling.   Gastrointestinal: Negative for heartburn, nausea, vomiting and abdominal pain.   Genitourinary: Negative for dysuria, urgency and frequency.   Musculoskeletal: Negative for myalgias, back pain and joint pain.   Skin: Negative for rash and itching.   Neurological: Negative for dizziness, tingling, tremors, sensory change, focal weakness and headaches.   Endo/Heme/Allergies: Does not bruise/bleed easily.   Psychiatric/Behavioral: Negative for depression, anxiety, or memory loss.     All other systems reviewed and are negative except as in  "HPI.         Objective:     /60   Pulse 81   Temp 36.4 °C (97.5 °F)   Ht 1.422 m (4' 8\")   Wt 42.1 kg (92 lb 13 oz)   SpO2 92%   BMI 20.81 kg/m²      Physical Exam   Constitutional: She is oriented to person, place, and time. She appears well-developed and well-nourished. No distress.   HENT:   Head: Normocephalic and atraumatic.   Right Ear: External ear normal.   Left Ear: External ear normal.   Nose: Nose normal.   Mouth/Throat: Oropharynx is clear and moist. No oropharyngeal exudate.   Eyes: Conjunctivae and EOM are normal. Pupils are equal, round, and reactive to light. Right eye exhibits no discharge. Left eye exhibits no discharge. No scleral icterus.   Neck: Normal range of motion. Neck supple. No JVD present. No tracheal deviation present. No thyromegaly present.   Cardiovascular: Normal rate, regular rhythm, normal heart sounds and intact distal pulses.  Exam reveals no gallop and no friction rub.    No murmur heard.  Pulmonary/Chest: Effort normal and breath sounds normal. No stridor. No respiratory distress. She has no wheezes. She has no rales (positive rales all over the lung fields, good air exchange). She exhibits no tenderness.   Abdominal: Soft. Bowel sounds are normal. She exhibits no distension and no mass. There is no tenderness. There is no rebound and no guarding. No hernia.   Musculoskeletal: Normal range of motion. She exhibits no edema, tenderness or deformity.   Lymphadenopathy:     She has no cervical adenopathy.   Neurological: She is alert and oriented to person, place, and time. She has normal reflexes. She displays normal reflexes. No cranial nerve deficit. She exhibits normal muscle tone. Coordination normal.   Skin: Skin is warm and dry. No rash noted. She is not diaphoretic. No erythema. No pallor.   Psychiatric: She has a normal mood and affect. Her behavior is normal. Judgment and thought content normal.   Nursing note and vitals reviewed.       I reviewed hospital " records.            Assessment/Plan:     1. Pneumonia of right upper lobe due to Pseudomonas species (CMS-[facility])  Culture came back Pseudomonas from sputum. She is on appropriate treatment with the right antibiotics. She will need follow-up chest x-ray to make sure the abnormalities on the x-ray clear with treatment with antibiotics. She will do the x-ray around and of December.  - DX-CHEST-2 VIEWS; Future    2. Iron deficiency anemia, unspecified iron deficiency anemia type  She will continue iron supplementation and we will do follow-up CBC and iron studies next visit.  - CBC WITH DIFFERENTIAL; Future  - IRON/TOTAL IRON BIND; Future  - FERRITIN; Future    20 minutes were spent on this visit. More than 50% of which was spent going over her medical records, medications, medical problems, plan of care and follow-up.    I will see her in a month for follow-up or sooner if needed.

## 2017-12-06 ENCOUNTER — TELEPHONE (OUTPATIENT)
Dept: MEDICAL GROUP | Facility: PHYSICIAN GROUP | Age: 77
End: 2017-12-06

## 2017-12-06 NOTE — TELEPHONE ENCOUNTER
Yue from Microbiology called with an AFB Culture on Irina Schaeferjeremias that came back POsitive AFB Sent to state to ID and its to follow

## 2017-12-14 ENCOUNTER — HOSPITAL ENCOUNTER (OUTPATIENT)
Dept: LAB | Facility: MEDICAL CENTER | Age: 77
End: 2017-12-14
Attending: NURSE PRACTITIONER
Payer: MEDICARE

## 2017-12-14 ENCOUNTER — HOSPITAL ENCOUNTER (OUTPATIENT)
Dept: RADIOLOGY | Facility: MEDICAL CENTER | Age: 77
End: 2017-12-14
Attending: FAMILY MEDICINE
Payer: MEDICARE

## 2017-12-14 DIAGNOSIS — Z13.1 ENCOUNTER FOR SCREENING EXAMINATION FOR IMPAIRED GLUCOSE REGULATION AND DIABETES MELLITUS: ICD-10-CM

## 2017-12-14 DIAGNOSIS — R05.9 COUGH: ICD-10-CM

## 2017-12-14 DIAGNOSIS — J15.1 PNEUMONIA OF RIGHT UPPER LOBE DUE TO PSEUDOMONAS SPECIES (HCC): ICD-10-CM

## 2017-12-14 DIAGNOSIS — Z13.220 SCREENING FOR LIPID DISORDERS: ICD-10-CM

## 2017-12-14 LAB
ALBUMIN SERPL BCP-MCNC: 3.8 G/DL (ref 3.2–4.9)
ALBUMIN/GLOB SERPL: 0.8 G/DL
ALP SERPL-CCNC: 90 U/L (ref 30–99)
ALT SERPL-CCNC: 16 U/L (ref 2–50)
ANION GAP SERPL CALC-SCNC: 10 MMOL/L (ref 0–11.9)
AST SERPL-CCNC: 25 U/L (ref 12–45)
BASOPHILS # BLD AUTO: 0.6 % (ref 0–1.8)
BASOPHILS # BLD: 0.07 K/UL (ref 0–0.12)
BILIRUB SERPL-MCNC: 0.6 MG/DL (ref 0.1–1.5)
BUN SERPL-MCNC: 14 MG/DL (ref 8–22)
CALCIUM SERPL-MCNC: 9.8 MG/DL (ref 8.5–10.5)
CHLORIDE SERPL-SCNC: 100 MMOL/L (ref 96–112)
CHOLEST SERPL-MCNC: 180 MG/DL (ref 100–199)
CO2 SERPL-SCNC: 27 MMOL/L (ref 20–33)
CREAT SERPL-MCNC: 0.54 MG/DL (ref 0.5–1.4)
EOSINOPHIL # BLD AUTO: 0.25 K/UL (ref 0–0.51)
EOSINOPHIL NFR BLD: 2.3 % (ref 0–6.9)
ERYTHROCYTE [DISTWIDTH] IN BLOOD BY AUTOMATED COUNT: 48.5 FL (ref 35.9–50)
GFR SERPL CREATININE-BSD FRML MDRD: >60 ML/MIN/1.73 M 2
GLOBULIN SER CALC-MCNC: 4.5 G/DL (ref 1.9–3.5)
GLUCOSE SERPL-MCNC: 88 MG/DL (ref 65–99)
HCT VFR BLD AUTO: 38.2 % (ref 37–47)
HDLC SERPL-MCNC: 53 MG/DL
HGB BLD-MCNC: 12.4 G/DL (ref 12–16)
IMM GRANULOCYTES # BLD AUTO: 0.06 K/UL (ref 0–0.11)
IMM GRANULOCYTES NFR BLD AUTO: 0.5 % (ref 0–0.9)
LDLC SERPL CALC-MCNC: 105 MG/DL
LYMPHOCYTES # BLD AUTO: 1.93 K/UL (ref 1–4.8)
LYMPHOCYTES NFR BLD: 17.7 % (ref 22–41)
MCH RBC QN AUTO: 30.2 PG (ref 27–33)
MCHC RBC AUTO-ENTMCNC: 32.5 G/DL (ref 33.6–35)
MCV RBC AUTO: 93.2 FL (ref 81.4–97.8)
MONOCYTES # BLD AUTO: 0.41 K/UL (ref 0–0.85)
MONOCYTES NFR BLD AUTO: 3.8 % (ref 0–13.4)
NEUTROPHILS # BLD AUTO: 8.19 K/UL (ref 2–7.15)
NEUTROPHILS NFR BLD: 75.1 % (ref 44–72)
NRBC # BLD AUTO: 0 K/UL
NRBC BLD AUTO-RTO: 0 /100 WBC
PLATELET # BLD AUTO: 465 K/UL (ref 164–446)
PMV BLD AUTO: 9.5 FL (ref 9–12.9)
POTASSIUM SERPL-SCNC: 3.7 MMOL/L (ref 3.6–5.5)
PROT SERPL-MCNC: 8.3 G/DL (ref 6–8.2)
RBC # BLD AUTO: 4.1 M/UL (ref 4.2–5.4)
SODIUM SERPL-SCNC: 137 MMOL/L (ref 135–145)
TRIGL SERPL-MCNC: 109 MG/DL (ref 0–149)
WBC # BLD AUTO: 10.9 K/UL (ref 4.8–10.8)

## 2017-12-14 PROCEDURE — 80053 COMPREHEN METABOLIC PANEL: CPT

## 2017-12-14 PROCEDURE — 36415 COLL VENOUS BLD VENIPUNCTURE: CPT

## 2017-12-14 PROCEDURE — 86480 TB TEST CELL IMMUN MEASURE: CPT

## 2017-12-14 PROCEDURE — 85025 COMPLETE CBC W/AUTO DIFF WBC: CPT

## 2017-12-14 PROCEDURE — 80061 LIPID PANEL: CPT | Mod: GA

## 2017-12-14 PROCEDURE — 71020 DX-CHEST-2 VIEWS: CPT

## 2017-12-15 ENCOUNTER — TELEPHONE (OUTPATIENT)
Dept: MEDICAL GROUP | Facility: PHYSICIAN GROUP | Age: 77
End: 2017-12-15

## 2017-12-15 DIAGNOSIS — R93.89 ABNORMAL CT OF THE CHEST: ICD-10-CM

## 2017-12-15 DIAGNOSIS — J15.1: ICD-10-CM

## 2017-12-15 LAB
M TB TUBERC IFN-G BLD QL: NEGATIVE
M TB TUBERC IFN-G/MITOGEN IGNF BLD: 0.01
M TB TUBERC IGNF/MITOGEN IGNF CONTROL: 59.78 [IU]/ML
MITOGEN IGNF BCKGRD COR BLD-ACNC: 0.06 [IU]/ML

## 2017-12-15 NOTE — TELEPHONE ENCOUNTER
I made a phone call to the health Department TB position and spoke with nurse Noa. They got the report of the patient's culture and it is the Mycobacterium avium and not Mycobacterium tuberculosis. At this time patient is asymptomatic. No treatment is necessary per Noa. I have referred the patient to the pulmonary clinic for ongoing abnormality with a chest x-ray consistent with bronchiectasis. The right upper lobe consolidation has improved but not resolved.  I spoke with patient's daughter. Patient is asymptomatic at this time. Made her aware of positive culture for Mycobacterium avium. Made her aware of the referral to the pulmonary clinic. Explained it to her that no treatment is needed at this time but pulmonologist may give her antibiotics based on the abnormality on the x-ray.  She will keep her appointment with me in 2 weeks.

## 2017-12-19 ENCOUNTER — OFFICE VISIT (OUTPATIENT)
Dept: PULMONOLOGY | Facility: HOSPICE | Age: 77
End: 2017-12-19
Payer: MEDICARE

## 2017-12-19 VITALS
RESPIRATION RATE: 12 BRPM | DIASTOLIC BLOOD PRESSURE: 60 MMHG | OXYGEN SATURATION: 92 % | HEIGHT: 56 IN | HEART RATE: 102 BPM | TEMPERATURE: 98.3 F | SYSTOLIC BLOOD PRESSURE: 132 MMHG | WEIGHT: 94 LBS | BODY MASS INDEX: 21.15 KG/M2

## 2017-12-19 DIAGNOSIS — J47.9 BRONCHIECTASIS WITHOUT COMPLICATION (HCC): ICD-10-CM

## 2017-12-19 DIAGNOSIS — A31.0 MAI (MYCOBACTERIUM AVIUM-INTRACELLULARE) INFECTION (HCC): ICD-10-CM

## 2017-12-19 PROCEDURE — 99215 OFFICE O/P EST HI 40 MIN: CPT | Performed by: INTERNAL MEDICINE

## 2017-12-19 RX ORDER — LORATADINE 10 MG/1
TABLET ORAL
Refills: 0 | COMMUNITY
Start: 2017-10-23 | End: 2018-01-04

## 2017-12-19 RX ORDER — FLUTICASONE PROPIONATE 50 MCG
SPRAY, SUSPENSION (ML) NASAL
Refills: 2 | COMMUNITY
Start: 2017-10-23 | End: 2018-04-05

## 2017-12-26 NOTE — PROGRESS NOTES
Chief Complaint   Patient presents with   • New Patient     abnormal Radiology       HPI:  The patient is a 77-year-old woman who was hospitalized in November with fever, chills, cough and a right upper lobe infiltrate. She was placed in isolation and multiple AFB smears were performed. All her smears were negative. However, subsequently she is starting to grow KAVITHA in 2 of the specimens. She was also noted to have Pseudomonas growing in one of her specimens from bronchoscopy.  The patient says she is not feeling better. She was born in the [facility]. She has lived in  for about 10 years but visits the [facility] on a regular basis. She says that she has never had tuberculosis. Her Quantiferon Gold was nonreactive.  Her chest x-ray shows:  Increased bilateral diffuse interstitial markings are not significantly changed. Bronchiectasis is again noted. More confluent opacification in the right upper lobe has improved. No significant pleural fluid or pneumothorax.  Her chest CT shows:  1.  Consolidation is identified which is prominent in the right lung apex. Findings could be due to pneumonia. Tuberculosis is a possibility.    2.  Surrounding groundglass opacification is also noted in the right upper lobe.    3.  Additional scattered multifocal opacifications are noted in each lung. Bronchiectasis is also present. Findings are likely due to infectious or inflammatory process.    4.  Mildly enlarged lymph nodes are noted in the upper mediastinum and pretracheal area.    She does have a chronic cough. She says the cough started in September  After she returned from a visit to the [facility]. She does complain of occasional wheezing. She also says she occasionally gets shortness of breath. She says she is a never smoker. It appears that she lives half the year in [place] and half the year in [place].  The patient says she is otherwise well.        Past Medical History:   Diagnosis Date   • Chronic cough     worse  since Sept 2017   • Falls    • Knee pain, right        ROS:   Constitutional: Denies fevers, chills, night sweats, fatigue or weight loss  Eyes: Denies vision loss, pain, drainage, double vision  Ears, Nose, Throat: Denies earache, tinnitus, hoarseness  Cardiovascular: Denies chest pain, tightness, palpitations  Respiratory:See history of present illness  Sleep: Denies, snoring, apnea  GI: Denies abdominal pain, nausea, vomiting, diarrhea  : Denies frequent urination, hematuria, painful urination  Musculoskeletal: Denies back pain, painful joints, sore muscles  Neurological: Denies headaches, seizures  Skin: Denies rashes, color changes  Psychiatric: Denies depression or thoughts of suicide  Hematologic: Denies bleeding tendency or clotting tendency  Allergic/Immunologic: Denies rhinitis, skin sensitivity    Social History     Social History   • Marital status:      Spouse name: N/A   • Number of children: N/A   • Years of education: N/A     Occupational History   • 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[facility]    • originally from the [facility]      here since October 2017     Social History Main Topics   • Smoking status: Former Smoker     Quit date: 11/29/1986   • Smokeless tobacco: Never Used      Comment: smoked during first trimester of prenancy   • Alcohol use No   • Drug use: No   • Sexual activity: Not Currently     Other Topics Concern   • Not on file     Social History Narrative   • No narrative on file     Chocolate and Chocolate flavor  Current Outpatient Prescriptions on File Prior to Visit   Medication Sig Dispense Refill   • ferrous sulfate 325 (65 Fe) MG tablet Take 1 Tab by mouth 2 times a day, with meals. 60 Tab 3   • ciprofloxacin (CIPRO) 500 MG Tab Take 1 Tab by mouth 2 times a day. (Patient not taking: Reported on 12/19/2017) 20 Tab 0     No current facility-administered medications on file prior to visit.      Blood pressure 132/60, pulse (!) 102, temperature 36.8 °C (98.3 °F), resp. rate 12, height 1.422 m  "(4' 8\"), weight 42.6 kg (94 lb), SpO2 92 %.  Family History   Problem Relation Age of Onset   • Asthma Father    • No Known Problems Sister        Physical Exam:  Small thin woman in no distress.  HEENT: PERRLA, EOMI, no scleral icterus, no nasal or oral lesions  Neck: No thyromegaly, no adenopathy, no bruits  Mallampatti: Grade II  Lungs: She has a few scattered crackles  Heart: Regular rate and rhythm, no gallops or murmurs  Abdomen: Soft, benign, no organomegaly  Extremities: No clubbing, cyanosis, or edema  Neurologic: Cranial nerve, motor, and sensory exam are normal    1. Bronchiectasis without complication (CMS-[facility])    2. KAVITHA (mycobacterium avium-intracellulare) infection (CMS-[facility])        This woman has evidence of bronchiectasis on CT scan. Her cultures have grown KAVITHA and Pseudomonas. At this point she is improved. She was treated in the hospital with ciprofloxacin. She is now off all antibiotics.    I reviewed her CT scan. She has extensive bronchiectasis. She will most likely require treatment for KAVITHA. I have called [facility] microbiology lab and asked them to submit her specimen for sensitivities to help guide treatment. On this initial evaluation she insisted that she is doing well. On her return we need to discuss treatment strategies. She seems reluctant to admit any symptoms. And I think she will be reluctant to initiate prolonged therapy.  We will also obtain pulmonary function testing to see if bronchodilators will be helpful.  Mucous clearance may be important for this woman also. This should be discussed on her next visit.  We spent 45 minutes talking with her daughter and the patient, reviewing imaging, and reviewing microbiology.  "

## 2017-12-28 ENCOUNTER — HOSPITAL ENCOUNTER (OUTPATIENT)
Dept: RADIOLOGY | Facility: MEDICAL CENTER | Age: 77
End: 2017-12-28
Attending: NURSE PRACTITIONER
Payer: MEDICARE

## 2017-12-28 PROCEDURE — G0202 SCR MAMMO BI INCL CAD: HCPCS

## 2018-01-02 ENCOUNTER — TELEPHONE (OUTPATIENT)
Dept: MEDICAL GROUP | Facility: PHYSICIAN GROUP | Age: 78
End: 2018-01-02

## 2018-01-02 LAB — TEST NAME 95000: NORMAL

## 2018-01-02 NOTE — TELEPHONE ENCOUNTER
----- Message from Bright Emerson M.D. sent at 1/2/2018  2:38 PM PST -----  Please let patient know that mammogram is normal. It is recommended repeat one in one year  Thank you,  Bright Emerson M.D.

## 2018-01-02 NOTE — PROGRESS NOTES
Please let patient know that mammogram is normal. It is recommended repeat one in one year  Thank you,  Bright Emerson M.D.

## 2018-01-03 LAB
MYCOBACTERIUM SPEC CULT: ABNORMAL
MYCOBACTERIUM SPEC CULT: ABNORMAL
RHODAMINE-AURAMINE STN SPEC: ABNORMAL
SIGNIFICANT IND 70042: ABNORMAL
SITE SITE: ABNORMAL
SOURCE SOURCE: ABNORMAL

## 2018-01-04 ENCOUNTER — OFFICE VISIT (OUTPATIENT)
Dept: MEDICAL GROUP | Facility: PHYSICIAN GROUP | Age: 78
End: 2018-01-04
Payer: MEDICARE

## 2018-01-04 VITALS
WEIGHT: 93.7 LBS | OXYGEN SATURATION: 90 % | HEART RATE: 82 BPM | SYSTOLIC BLOOD PRESSURE: 137 MMHG | TEMPERATURE: 97.4 F | DIASTOLIC BLOOD PRESSURE: 70 MMHG | BODY MASS INDEX: 21.08 KG/M2 | HEIGHT: 56 IN

## 2018-01-04 DIAGNOSIS — A31.0 MAI (MYCOBACTERIUM AVIUM-INTRACELLULARE) INFECTION (HCC): ICD-10-CM

## 2018-01-04 DIAGNOSIS — J20.9 ACUTE BRONCHITIS, UNSPECIFIED ORGANISM: ICD-10-CM

## 2018-01-04 DIAGNOSIS — J47.9 BRONCHIECTASIS WITHOUT COMPLICATION (HCC): ICD-10-CM

## 2018-01-04 PROCEDURE — 99213 OFFICE O/P EST LOW 20 MIN: CPT | Performed by: FAMILY MEDICINE

## 2018-01-04 RX ORDER — AZITHROMYCIN 250 MG/1
TABLET, FILM COATED ORAL
Qty: 6 TAB | Refills: 0 | Status: SHIPPED | OUTPATIENT
Start: 2018-01-04 | End: 2018-05-18

## 2018-01-04 NOTE — PROGRESS NOTES
"Subjective:      Irina Conrad is a 77 y.o. female who presents with Follow-Up (pneumonia) and Cough            HPI     Patient returns for follow-up.    She was seen and evaluated by Dr. Collazo, pulmonologist for her bronchiectasis and KAVITHA infection. Dr. Collazo has requested sensitivity testing for the KAVITHA for consideration for treatment in the future. Patient has a follow-up appointment in March 2018 with pulmonary function tests to be done at that time. She also will need to have follow-up chest x-ray at that time. She has been asymptomatic without any problems with cough, shortness of breath up until 3 days ago when she started coughing again productive with whitish sometimes grayish sputum. According to the daughter who is with her today, they went to [facility] [place] prior to the onset of her cough. Patient denies any fever or chills. She denies any shortness of breath. She denies any cold symptoms.    Past medical history, past surgical history, family history reviewed-no changes    Social history reviewed-no changes    Allergies reviewed-no changes    Medications reviewed-no changes      ROS     Review of systems as per history of present illness, the rest are negative.       Objective:     /70   Pulse 82   Temp 36.3 °C (97.4 °F)   Ht 1.422 m (4' 8\")   Wt 42.5 kg (93 lb 11.1 oz)   SpO2 90%   BMI 21.01 kg/m²      Physical Exam     Examined alert, awake, oriented, not in distress    Ears-tympanic membranes intact without evidence of infection  Nose-no discharge, no obstruction  Throat-no erythema, tonsils not enlarged, no exudates  Neck-supple, no lymphadenopathy, no thyromegaly  Lungs-clear to auscultation, no wheezes, she has few scattered crackles, good air exchange  Heart-regular rate and rhythm, no murmur  Extremities-no edema, clubbing, cyanosis            Assessment/Plan:     1. Acute bronchitis, unspecified organism  Because of her comorbidities I will go ahead and " treat with a Zithromax Z-RAMSES for acute bronchitis. We want to avoid further progression to pneumonia. ER precautions advised. Daughter will call if there is no improvement after she finishes antibiotics.  - azithromycin (ZITHROMAX) 250 MG Tab; Take 2 tabs today then 1 tab daily  For 4 days.  Dispense: 6 Tab; Refill: 0    2. Bronchiectasis without complication (CMS-HCC)  Keep appointment with pulmonologist in March.    3. KAVITHA (mycobacterium avium-intracellulare) infection (CMS-HCC)  We are waiting for the sensitivity testing. Keep appointment with pulmonologist in March. We will follow along.      Please note that this dictation was created using voice recognition software. I have worked with consultants from the vendor as well as technical experts from [facility] to optimize the interface. I have made every reasonable attempt to correct obvious errors, but I expect that there are errors of grammar and possibly content I did not discover before finalizing the note.

## 2018-01-08 DIAGNOSIS — J20.9 ACUTE BRONCHITIS, UNSPECIFIED ORGANISM: ICD-10-CM

## 2018-01-08 RX ORDER — CIPROFLOXACIN 500 MG/1
500 TABLET, FILM COATED ORAL 2 TIMES DAILY
Qty: 20 TAB | Refills: 0 | Status: SHIPPED | OUTPATIENT
Start: 2018-01-08 | End: 2018-04-05

## 2018-01-09 LAB
MYCOBACTERIUM SPEC CULT: NORMAL
RHODAMINE-AURAMINE STN SPEC: NORMAL
SIGNIFICANT IND 70042: NORMAL
SITE SITE: NORMAL
SOURCE SOURCE: NORMAL

## 2018-02-05 NOTE — ADDENDUM NOTE
Encounter addended by: Caridad Porter R.N. on: 2/5/2018  9:48 AM<BR>    Actions taken: Flowsheet accepted

## 2018-03-22 ENCOUNTER — OFFICE VISIT (OUTPATIENT)
Dept: PULMONOLOGY | Facility: HOSPICE | Age: 78
End: 2018-03-22
Payer: MEDICARE

## 2018-03-22 ENCOUNTER — NON-PROVIDER VISIT (OUTPATIENT)
Dept: PULMONOLOGY | Facility: HOSPICE | Age: 78
End: 2018-03-22
Payer: MEDICARE

## 2018-03-22 ENCOUNTER — APPOINTMENT (OUTPATIENT)
Dept: RADIOLOGY | Facility: IMAGING CENTER | Age: 78
End: 2018-03-22
Attending: INTERNAL MEDICINE
Payer: MEDICARE

## 2018-03-22 VITALS
RESPIRATION RATE: 14 BRPM | OXYGEN SATURATION: 93 % | HEART RATE: 86 BPM | HEIGHT: 57 IN | TEMPERATURE: 97.9 F | BODY MASS INDEX: 21.14 KG/M2 | SYSTOLIC BLOOD PRESSURE: 130 MMHG | WEIGHT: 98 LBS | DIASTOLIC BLOOD PRESSURE: 90 MMHG

## 2018-03-22 VITALS — WEIGHT: 98 LBS | BODY MASS INDEX: 22.68 KG/M2 | HEIGHT: 55 IN

## 2018-03-22 DIAGNOSIS — J47.9 BRONCHIECTASIS WITHOUT COMPLICATION (HCC): ICD-10-CM

## 2018-03-22 DIAGNOSIS — A31.0 MAI (MYCOBACTERIUM AVIUM-INTRACELLULARE) INFECTION (HCC): ICD-10-CM

## 2018-03-22 PROCEDURE — 71046 X-RAY EXAM CHEST 2 VIEWS: CPT | Mod: TC,FY | Performed by: INTERNAL MEDICINE

## 2018-03-22 PROCEDURE — 99215 OFFICE O/P EST HI 40 MIN: CPT | Mod: 25 | Performed by: INTERNAL MEDICINE

## 2018-03-22 PROCEDURE — 94060 EVALUATION OF WHEEZING: CPT | Performed by: INTERNAL MEDICINE

## 2018-03-22 PROCEDURE — 94726 PLETHYSMOGRAPHY LUNG VOLUMES: CPT | Performed by: INTERNAL MEDICINE

## 2018-03-22 ASSESSMENT — PULMONARY FUNCTION TESTS
FEV1/FVC_PREDICTED: 75
FEV1/FVC: 71.43
FEV1/FVC_PERCENT_PREDICTED: 73
FEV1/FVC_PERCENT_CHANGE: 0
FEV1_PERCENT_CHANGE: 8
FEV1: .6
FVC_PERCENT_PREDICTED: 51
FVC_PREDICTED: 1.76
FEV1/FVC: 71
FEV1_PERCENT_CHANGE: 8
FVC_PERCENT_PREDICTED: 47
FEV1_LLN: 1.08
FEV1/FVC_PERCENT_CHANGE: 100
FEV1/FVC_PERCENT_PREDICTED: 98
FEV1/FVC_PERCENT_PREDICTED: 94
FVC: .84
FEV1: .65
FVC_LLN: 1.47
FEV1/FVC_PERCENT_PREDICTED: 94
FEV1/FVC_PERCENT_LLN: 63
FEV1/FVC_PERCENT_PREDICTED: 98
FEV1_PREDICTED: 1.29
FEV1_PERCENT_PREDICTED: 50
FEV1_PERCENT_PREDICTED: 46
FEV1/FVC: 71
FVC: .91
FEV1/FVC: 71

## 2018-03-22 NOTE — PATIENT INSTRUCTIONS
We will initiate treatment for KAVITHA which is sensitive to macrolides  First week     Azithromycin 250 mg 3 times per week Monday Wednesday Friday    Second week     Continue azithromycin     Add rifampin 300 mg 3 times per week Monday Wednesday Friday    Third week     Continue azithromycin and rifampin     Add ethambutol 800 mg (2×400 mg tablets) 3 times per week Monday Wednesday    and Friday    She does not need to take medications at the same time    Start Anoro one puff per day    She will need a hearing test, audiometry, as a baseline  She will also need an eye examination for visual acuity    We will also obtain baseline CBC, CMP, EKG  We will see her back in one month

## 2018-03-22 NOTE — PROCEDURES
Technician Terra Sanchez, [facility] Comments:Good patient effort & cooperation.  The results of this test meet the ATS/ERS standards for acceptability & reproducibility.  Test was performed on the [employer] Body Plethysmograph-Elite DX system.  Predicted values were [facility], [place]-3 for spirometry, [facility] for DLCO, ITS for Lung Volumes.  The DLCO was uncorrected for Hgb.  A bronchodilator of Ventolin HFA -2puffs via spacer administered.  DLCO performed during dilation period. DLCO results unsupported due to smaller volumes.     Interpretation:    FEV1 is 0.60 L which is 46% of predicted. FEV1 FVC ratio is 71%. MVV is reduced at 39% of predicted.  After administration of inhaled bronchodilator the FEV1 improved to 0.65 L. This is a relative 8% improvement.  Lung volumes demonstrate mild hyperinflation with a total lung capacity of 95% predicted.  Airways resistance is increased.    Severe obstructive lung disease. The lack of severe hyperinflation may indicate a coexisting restrictive process.

## 2018-03-23 ENCOUNTER — TELEPHONE (OUTPATIENT)
Dept: PULMONOLOGY | Facility: HOSPICE | Age: 78
End: 2018-03-23

## 2018-03-23 NOTE — TELEPHONE ENCOUNTER
Confirmed the rx ethambutol and directions w/ the pharmacist Maksim at the local pharmacy on file.

## 2018-03-27 NOTE — PROGRESS NOTES
Chief Complaint   Patient presents with   • Follow-Up     Review PFT and Ches x-ray results       HPI:  The patient is a 77-year-old woman who was hospitalized in November with fever, chills, cough and a right upper lobe infiltrate. She was placed in isolation and multiple AFB smears were performed. All her smears were negative. However, subsequently she  grew KAVITHA in 2 of the specimens. She was also noted to have Pseudomonas growing in one of her specimens from bronchoscopy.  The patient says she is  feeling better. She was born in the [facility]. She has lived in  for about 10 years but visits the [facility] on a regular basis. She says that she has never had tuberculosis. Her Quantiferon Gold was nonreactive.  Her chest x-ray shows:  Increased bilateral diffuse interstitial markings are not significantly changed. Bronchiectasis is again noted. More confluent opacification in the right upper lobe has improved. No significant pleural fluid or pneumothorax.  Her chest CT shows:  1.  Consolidation is identified which is prominent in the right lung apex. Findings could be due to pneumonia. Tuberculosis is a possibility.    2.  Surrounding groundglass opacification is also noted in the right upper lobe.    3.  Additional scattered multifocal opacifications are noted in each lung. Bronchiectasis is also present. Findings are likely due to infectious or inflammatory process.    4.  Mildly enlarged lymph nodes are noted in the upper mediastinum and pretracheal area.     She does have a chronic cough. She says the cough started in September  after she returned from a visit to the [facility]. She does complain of occasional wheezing. She also says she occasionally gets shortness of breath. She says she is a never smoker. It appears that she lives half the year in [place] and half the year in [place].  The patient says she is otherwise well.    After her last visit we asked the lab to perform macrolide sensitivity  testing on her KAVITHA specimens. The specimens are sensitive to macrolides.    Past Medical History:   Diagnosis Date   • Chronic cough     worse since Sept 2017   • Falls    • Knee pain, right        ROS:  Constitutional: Denies fevers, chills, night sweats, fatigue or weight loss  Eyes: Denies vision loss, pain, drainage, double vision  Ears, Nose, Throat: Denies earache, tinnitus, hoarseness  Cardiovascular: Denies chest pain, tightness, palpitations  Respiratory: See history of present illness  Sleep: Denies, snoring, apnea  GI: Denies abdominal pain, nausea, vomiting, diarrhea  : Denies frequent urination, hematuria, painful urination  Musculoskeletal: Denies back pain, painful joints, sore muscles  Neurological: Denies headaches, seizures  Skin: Denies rashes, color changes  Psychiatric: Denies depression or thoughts of suicide  Hematologic: Denies bleeding tendency or clotting tendency  Allergic/Immunologic: Denies rhinitis, skin sensitivity    Social History     Social History   • Marital status:      Spouse name: N/A   • Number of children: N/A   • Years of education: N/A     Occupational History   • cafe in [facility]    • originally from the [facility]      here since October 2017     Social History Main Topics   • Smoking status: Former Smoker     Quit date: 11/29/1986   • Smokeless tobacco: Never Used      Comment: smoked during first trimester of prenancy   • Alcohol use No   • Drug use: No   • Sexual activity: Not Currently     Other Topics Concern   • Not on file     Social History Narrative   • No narrative on file     Chocolate and Chocolate flavor  Current Outpatient Prescriptions on File Prior to Visit   Medication Sig Dispense Refill   • fluticasone (FLONASE) 50 MCG/ACT nasal spray INT 1 SPRAY IEN ONCE D  2   • ferrous sulfate 325 (65 Fe) MG tablet Take 1 Tab by mouth 2 times a day, with meals. 60 Tab 3   • ciprofloxacin (CIPRO) 500 MG Tab Take 1 Tab by mouth 2 times a day. 20 Tab 0   •  "azithromycin (ZITHROMAX) 250 MG Tab Take 2 tabs today then 1 tab daily  For 4 days. 6 Tab 0     No current facility-administered medications on file prior to visit.      Blood pressure 130/90, pulse 86, temperature 36.6 °C (97.9 °F), resp. rate 14, height 1.448 m (4' 9\"), weight 44.5 kg (98 lb), SpO2 93 %.  Family History   Problem Relation Age of Onset   • Asthma Father    • No Known Problems Sister        Physical Exam:  Elderly, small, thin. No distress on room air  HEENT: PERRLA, EOMI, no scleral icterus, no nasal or oral lesions  Neck: No thyromegaly, no adenopathy, no bruits  Mallampatti: Grade II  Lungs: Scattered crackles no wheezes  Heart: Regular rate and rhythm, no gallops or murmurs  Abdomen: Soft, benign, no organomegaly  Extremities: No clubbing, cyanosis, or edema  Neurologic: Cranial nerve, motor, and sensory exam are normal    1. KAVIHTA (mycobacterium avium-intracellulare) infection (CMS-[facility])    2. Bronchiectasis without complication (CMS-HCC)        We will initiate treatment for KAVITHA which is sensitive to macrolides  First week     Azithromycin 250 mg 3 times per week Monday Wednesday Friday    Second week     Continue azithromycin     Add rifampin 300 mg 3 times per week Monday Wednesday Friday    Third week     Continue azithromycin and rifampin     Add ethambutol 800 mg (2×400 mg tablets) 3 times per week Monday Wednesday    and Friday    She does not need to take medications at the same time    Start Anoro one puff per day    She will need a hearing test, audiometry, as a baseline  She will also need an eye examination for visual acuity    We will also obtain baseline CBC, CMP, EKG  We will see her back in one month    We spent over 40 minutes during this visit explaining to the patient and her daughter the necessity for treatment. The fact that the treatment will be prolonged. Continue to gradually introduce to medications. We also stressed the need for audiometry and an eye exam in addition to " blood tests

## 2018-04-05 ENCOUNTER — OFFICE VISIT (OUTPATIENT)
Dept: MEDICAL GROUP | Facility: PHYSICIAN GROUP | Age: 78
End: 2018-04-05
Payer: MEDICARE

## 2018-04-05 VITALS
HEART RATE: 86 BPM | BODY MASS INDEX: 21.17 KG/M2 | WEIGHT: 98.11 LBS | SYSTOLIC BLOOD PRESSURE: 146 MMHG | DIASTOLIC BLOOD PRESSURE: 80 MMHG | HEIGHT: 57 IN | TEMPERATURE: 97.8 F | OXYGEN SATURATION: 90 %

## 2018-04-05 DIAGNOSIS — A31.0 MAI (MYCOBACTERIUM AVIUM-INTRACELLULARE) INFECTION (HCC): ICD-10-CM

## 2018-04-05 DIAGNOSIS — J47.9 BRONCHIECTASIS WITHOUT COMPLICATION (HCC): ICD-10-CM

## 2018-04-05 PROCEDURE — 99213 OFFICE O/P EST LOW 20 MIN: CPT | Performed by: FAMILY MEDICINE

## 2018-04-05 RX ORDER — RIFAMPIN 300 MG/1
300 CAPSULE ORAL 2 TIMES DAILY
COMMUNITY
End: 2018-11-15 | Stop reason: SDUPTHER

## 2018-04-05 RX ORDER — ETHAMBUTOL HYDROCHLORIDE 400 MG/1
400 TABLET, FILM COATED ORAL DAILY
COMMUNITY
End: 2019-03-24 | Stop reason: SDUPTHER

## 2018-04-05 NOTE — PROGRESS NOTES
"Subjective:      Irina Conrad is a 77 y.o. female who presents with Follow-Up            HPI     Patient returns for follow-up. She is accompanied by her daughter.    Patient has been seen and evaluated by the pulmonologist for bronchiectasis and culture confirmed KAVITHA infection. Sensitivity testing showed sensitive to macrolides. Patient was started on treatment for KAVITHA a week ago initially with azithromycin and then this week it would be azithromycin and rifampin and third week will be azithromycin plus rifampin plus ethambutol. She has been tolerating the medications so far. She said she has minimal cough sometimes whitish to grayish phlegm but not as bad as when she had the pneumonia in November 2017. She denies any shortness of breath. Her PFTs showed severe obstructive disease with coexistent restrictive process. She is now on inhaler prescribed by the pulmonologist which is Anoro Ellipta. Patient does not require oxygen. She had a negative Quantiferon test.    Past medical history, past surgical history, family history reviewed-no changes    Social history reviewed-no changes    Allergies reviewed-no changes    Medications reviewed-no changes    ROS     As per history of present illness, the rest are negative.       Objective:     /80   Pulse 86   Temp 36.6 °C (97.8 °F)   Ht 1.448 m (4' 9\")   Wt 44.5 kg (98 lb 1.7 oz)   SpO2 90%   BMI 21.23 kg/m²      Physical Exam     Examined alert, awake, oriented, not in distress    Neck-supple, no lymphadenopathy, no thyromegaly  Lungs-clear to auscultation, scattered crackles both lungs, no wheezes  Heart-regular rate and rhythm, no murmur  Extremities-no edema, clubbing, cyanosis            Assessment/Plan:     1. KAVITHA (mycobacterium avium-intracellulare) infection (CMS-HCC)  Sensitive to macrolides. She just recently started taking antibiotics for KAVITHA and she is on her second week per recommendation by the pulmonologist. We will follow along. Continue " follow-up with pulmonologist.    2. Bronchiectasis without complication (CMS-HCC)  Currently stable. She is now on inhaler. Continue follow-up with pulmonologist. Up-to-date with flu shot and Prevnar 13. She will need her Pneumovax in November 2018.    I will see her for follow-up when she returns for her annual wellness visit next month.      Please note that this dictation was created using voice recognition software. I have worked with consultants from the vendor as well as technical experts from [facility]  [employer] to optimize the interface. I have made every reasonable attempt to correct obvious errors, but I expect that there are errors of grammar and possibly content I did not discover before finalizing the note.

## 2018-05-07 ENCOUNTER — TELEPHONE (OUTPATIENT)
Dept: MEDICAL GROUP | Facility: PHYSICIAN GROUP | Age: 78
End: 2018-05-07

## 2018-05-07 NOTE — TELEPHONE ENCOUNTER
Left message for patient to call back regarding pre-visit planning. Please transfer call to Ana Rosa at [phone].

## 2018-05-11 ENCOUNTER — TELEPHONE (OUTPATIENT)
Dept: MEDICAL GROUP | Facility: PHYSICIAN GROUP | Age: 78
End: 2018-05-11

## 2018-05-11 NOTE — TELEPHONE ENCOUNTER
VOICEMAIL  1. Caller Name: Daughter                      Call Back Number: [phone] (home)     2. Message: Patient's daughter LVM asking if labs or imaging was required prior to appointment on 5/17/18.     3. Patient approves office to leave a detailed voicemail/MyChart message: yes

## 2018-05-17 ENCOUNTER — OFFICE VISIT (OUTPATIENT)
Dept: MEDICAL GROUP | Facility: PHYSICIAN GROUP | Age: 78
End: 2018-05-17
Payer: MEDICARE

## 2018-05-17 VITALS
WEIGHT: 96.12 LBS | SYSTOLIC BLOOD PRESSURE: 110 MMHG | DIASTOLIC BLOOD PRESSURE: 70 MMHG | HEART RATE: 78 BPM | RESPIRATION RATE: 16 BRPM | BODY MASS INDEX: 20.74 KG/M2 | OXYGEN SATURATION: 94 % | TEMPERATURE: 99 F | HEIGHT: 57 IN

## 2018-05-17 DIAGNOSIS — F32.A MILD DEPRESSION: ICD-10-CM

## 2018-05-17 DIAGNOSIS — Z23 NEED FOR DIPHTHERIA-TETANUS-PERTUSSIS (TDAP) VACCINE: ICD-10-CM

## 2018-05-17 DIAGNOSIS — Z91.81 RISK FOR FALLS: ICD-10-CM

## 2018-05-17 DIAGNOSIS — Z23 NEED FOR SHINGLES VACCINE: ICD-10-CM

## 2018-05-17 DIAGNOSIS — J47.9 BRONCHIECTASIS WITHOUT COMPLICATION (HCC): ICD-10-CM

## 2018-05-17 DIAGNOSIS — A31.0 MAI (MYCOBACTERIUM AVIUM-INTRACELLULARE) INFECTION (HCC): ICD-10-CM

## 2018-05-17 DIAGNOSIS — Z00.00 MEDICARE ANNUAL WELLNESS VISIT, INITIAL: ICD-10-CM

## 2018-05-17 PROCEDURE — G0439 PPPS, SUBSEQ VISIT: HCPCS | Performed by: FAMILY MEDICINE

## 2018-05-17 ASSESSMENT — ENCOUNTER SYMPTOMS: GENERAL WELL-BEING: GOOD

## 2018-05-17 ASSESSMENT — PATIENT HEALTH QUESTIONNAIRE - PHQ9
5. POOR APPETITE OR OVEREATING: 1 - SEVERAL DAYS
SUM OF ALL RESPONSES TO PHQ QUESTIONS 1-9: 6
CLINICAL INTERPRETATION OF PHQ2 SCORE: 2

## 2018-05-17 ASSESSMENT — ACTIVITIES OF DAILY LIVING (ADL)
TRANSPORTATION COMMENTS: DAUGHTER DRIVES
BATHING_REQUIRES_ASSISTANCE: 0

## 2018-05-17 NOTE — PROGRESS NOTES
Chief Complaint   Patient presents with   • Annual Wellness Visit         HPI:  Irina is a 77 y.o. here for Medicare Annual Wellness Visit        Patient Active Problem List    Diagnosis Date Noted   • KAVITHA (mycobacterium avium-intracellulare) infection (HCC) 04/05/2018   • Bronchiectasis without complication ([facility])    • Anemia 11/14/2017   • Cough 10/23/2017   • Risk for falls 10/23/2017       Current Outpatient Prescriptions   Medication Sig Dispense Refill   • riFAMPin (RIFADINE) 300 MG Cap Take 300 mg by mouth 2 times a day.     • umeclidinium-vilanterol (ANORO ELLIPTA) 62.5-25 MCG/INH AEROSOL POWDER, BREATH ACTIVATED inhaler Inhale 1 Puff by mouth every day.     • ethambutol (MYAMBUTOL) 400 MG Tab Take 400 mg by mouth every day.     • azithromycin (ZITHROMAX) 250 MG Tab Take 2 tabs today then 1 tab daily  For 4 days. 6 Tab 0     No current facility-administered medications for this visit.         Patient is taking medications as noted in medication list.  Current supplements as per medication list.     Allergies: Chocolate and Chocolate flavor    Current social contact/activities: [employer], shopping     Is patient current with immunizations? No, due for TDAP. Patient is interested in receiving NONE today.    She  reports that she quit smoking about 31 years ago. She has never used smokeless tobacco. She reports that she does not drink alcohol or use drugs.  Counseling given: Not Answered        DPA/Advanced directive: Patient has Advanced Directive on file.     ROS:    Gait: Uses no assistive device   Ostomy: no   Other tubes: no   Amputations: no   Chronic oxygen use no   Last eye exam 2016   Wears hearing aids: no   : Reports urinary leakage during the last 6 months that has somewhat interfered with their daily activities or sleep.      Screening:        Little interest or pleasure in doing things?  1 - several days  Feeling down, depressed, or hopeless? 1 - several days  Trouble falling or staying asleep, or  sleeping too much?  1 - several days  Feeling tired or having little energy?  1 - several days  Poor appetite or overeating?  1 - several days  Feeling bad about yourself - or that you are a failure or have let yourself or your family down? 1 - several days  Trouble concentrating on things, such as reading the newspaper or watching television? 0 - not at all  Moving or speaking so slowly that other people could have noticed.  Or the opposite - being so fidgety or restless that you have been moving around a lot more than usual?  0 - not at all  Thoughts that you would be better off dead, or of hurting yourself?  0 - not at all  Patient Health Questionnaire Score: 6      If depressive symptoms identified deferred to follow up visit unless specifically addressed in assessment and plan.    Interpretation of PHQ-9 Total Score   Score Severity   1-4 No Depression   5-9 Mild Depression   10-14 Moderate Depression   15-19 Moderately Severe Depression   20-27 Severe Depression      Screening for Cognitive Impairment    Three Minute Recall (leader, season, table)  3/3 Leader season tbale  Draw clock face with all 12 numbers and set the hands to show 10 past 11.  Yes Time 11:10    5/5  If cognitive concerns identified, deferred for follow up unless specifically addressed in assessment and plan.    Fall Risk Assessment    Has the patient had two or more falls in the last year or any fall with injury in the last year?  Yes  If fall risk identified, deferred for follow up unless specifically addressed in assessment and plan.      Safety Assessment    Throw rugs on floor.  Yes  Handrails on all stairs.  No  Good lighting in all hallways.  Yes  Difficulty hearing.  No  Patient counseled about all safety risks that were identified.    Functional Assessment ADLs    Are there any barriers preventing you from cooking for yourself or meeting nutritional needs?  No.    Are there any barriers preventing you from driving safely or  obtaining transportation?  Yes. Daughter drives  Are there any barriers preventing you from using a telephone or calling for help?  No.    Are there any barriers preventing you from shopping?  No.    Are there any barriers preventing you from taking care of your own finances?  No.    Are there any barriers preventing you from managing your medications?    No.    Are there any barriers preventing you from showering, bathing or dressing yourself?  No.    Are you currently engaging in any exercise or physical activity?  Yes.  walking  What is your perception of your health?  Good.    Health Maintenance Summary                Annual Wellness Visit Overdue 1940     IMM DTaP/Tdap/Td Vaccine Overdue 5/18/1961     PAP SMEAR Overdue 5/18/1963     COLONOSCOPY Overdue 5/18/1992     BONE DENSITY Overdue 5/18/2007     IMM PNEUMOCOCCAL 65+ (ADULT) LOW/MEDIUM RISK SERIES Next Due 11/15/2018      Done 11/15/2017 Imm Admin: Pneumococcal Conjugate Vaccine (Prevnar/PCV-13)    MAMMOGRAM Next Due 12/28/2018      Done 12/28/2017 MA-MAMMO SCREENING BILAT W/TOMOSYNTHESIS W/CAD          Patient Care Team:  Poppy Freeman M.D. as PCP - General (Family Medicine)  Henry Waters P.A.-C. (Family Medicine)      Social History   Substance Use Topics   • Smoking status: Former Smoker     Quit date: 11/29/1986   • Smokeless tobacco: Never Used      Comment: smoked during first trimester of prenancy   • Alcohol use No     Family History   Problem Relation Age of Onset   • Asthma Father    • No Known Problems Sister      She  has a past medical history of Bronchiectasis (HCC); Chronic cough; Falls; and Knee pain, right.   Past Surgical History:   Procedure Laterality Date   • BRONCHOSCOPY-ENDO N/A 11/19/2017    Procedure: BRONCHOSCOPY-ENDO;  Surgeon: Chriss Goldberg M.D.;  [facility] ENDOSCOPY [facility];  Service: Ent         Exam:     Blood pressure 110/70, pulse 78, temperature 37.2 °C (99 °F), resp. rate 16, height 1.435 m (4'  "8.5\"), weight 43.6 kg (96 lb 1.9 oz), SpO2 94 %. Body mass index is 21.17 kg/m².    Hearing good.    Dentition fair  Alert, oriented in no acute distress.  Eye contact is good, speech goal directed, affect calm  Skin:                 Warm, no rashes in visible areas    Head:               Atraumatic, normocephalic    Eyes:               Pupils equal, round and reactive to light, extraocular muscles movement                           intact, clear conjunctivae    Ears:               Tympanic membranes intact without evidence of infection    Nose:              No nasal discharge, no obstruction    Mouth:             No oral lesions    Throat:            Tonsils not enlarged, no exudates    Neck:              Trachea midline, supple, no lymphadenopathy, no thyromegaly    Lungs:             Clear to auscultation, scattered rales all over the lung fields which is an old finding consistent with bronchiectasis, no wheezes, good air exchange    Heart:              Regular rate and rhythm, no murmur    Abdomen:       Good bowel sounds, soft, nontender, no hepatosplenomegaly, no masses    Extremities:    No edema, no clubbing, no cyanosis    Psych:            Alert and oriented x3, normal affect    Neuro:            Cranial nerves intact, Motor strength 5/5 upper and lower extremities,                                 DTRs 2+, sensation intact to light touch, negative Romberg    Assessment and Plan. The following treatment and monitoring plan is recommended:      1. Medicare annual wellness visit, initial  Over the age for GYN exam, Pap smear, mammogram, colonoscopy. Up-to-date with immunizations except for tdap and shingles vaccine. She will get these from the local drugstore. Prescriptions were given. She will need Pneumovax 23 a year after she got the Prevnar 13 which will be in November 2018.    2. Risk for falls  Counseling done to avoid risk for falls.    3. KAVITHA (mycobacterium avium-intracellulare) infection (HCC)  This " was diagnosed in November 2017 with positive sputum cultures. Patient is currently under treatment for this infection which was started in March 2018. She is managed and followed by her pulmonologist. She will need to take 3 antibiotics for 3 years which are azithromycin, rifampin, ethambutol.    4. Bronchiectasis without complication (HCC)  Currently stable. She is followed by the pulmonary clinic. She is not on any oxygen.    5. Mild depression (HCC)  Her PHQ-9 is consistent with mild depression. Per daughter this is because patient is worried about her children in the [facility] with regards to their finances and her health. I discussed counseling, treatment or both. Patient and daughter state that this is mild enough to require treatment and she is able to handle this with good support from her daughter.    6. Need for shingles vaccine  She was given prescription for shingles vaccine that she will get from a local drugstore. She will need a second dose in 2-6 months time.    7. Need for diphtheria-tetanus-pertussis (Tdap) vaccine  She was given prescription to get the tdap vaccine from a local drugstore.      Services suggested: No services needed at this time  Health Care Screening recommendations as per orders if indicated.  Referrals offered: PT/OT/Nutrition counseling/Behavioral Health/Smoking cessation as per orders if indicated.    Discussion today about general wellness and lifestyle habits:    · Prevent falls and reduce trip hazards; Cautioned about securing or removing rugs.  · Have a working fire alarm and carbon monoxide detector;   · Engage in regular physical activity and social activities       Follow-up: Follow-up as scheduled in November 2018 or sooner if needed.      Please note that this dictation was created using voice recognition software. I have worked with consultants from the vendor as well as technical experts from [employer] to optimize the interface. I have made every reasonable  attempt to correct obvious errors, but I expect that there are errors of grammar and possibly content I did not discover before finalizing the note.

## 2018-07-17 ENCOUNTER — HOSPITAL ENCOUNTER (EMERGENCY)
Facility: MEDICAL CENTER | Age: 78
End: 2018-07-17
Attending: EMERGENCY MEDICINE
Payer: MEDICARE

## 2018-07-17 VITALS
TEMPERATURE: 98.9 F | WEIGHT: 100 LBS | HEIGHT: 57 IN | BODY MASS INDEX: 21.57 KG/M2 | RESPIRATION RATE: 16 BRPM | HEART RATE: 86 BPM | SYSTOLIC BLOOD PRESSURE: 137 MMHG | DIASTOLIC BLOOD PRESSURE: 84 MMHG | OXYGEN SATURATION: 93 %

## 2018-07-17 DIAGNOSIS — M25.561 CHRONIC PAIN OF RIGHT KNEE: ICD-10-CM

## 2018-07-17 DIAGNOSIS — T50.Z95A IMMUNIZATION REACTION, INITIAL ENCOUNTER: ICD-10-CM

## 2018-07-17 DIAGNOSIS — G89.29 CHRONIC PAIN OF RIGHT KNEE: ICD-10-CM

## 2018-07-17 DIAGNOSIS — M25.461 EFFUSION, RIGHT KNEE: ICD-10-CM

## 2018-07-17 PROCEDURE — 99284 EMERGENCY DEPT VISIT MOD MDM: CPT

## 2018-07-17 PROCEDURE — 700111 HCHG RX REV CODE 636 W/ 250 OVERRIDE (IP): Performed by: EMERGENCY MEDICINE

## 2018-07-17 RX ORDER — PREDNISONE 20 MG/1
40 TABLET ORAL ONCE
Status: COMPLETED | OUTPATIENT
Start: 2018-07-18 | End: 2018-07-17

## 2018-07-17 RX ADMIN — PREDNISONE 40 MG: 20 TABLET ORAL at 23:42

## 2018-07-17 ASSESSMENT — PAIN SCALES - WONG BAKER: WONGBAKER_NUMERICALRESPONSE: HURTS AS MUCH AS POSSIBLE

## 2018-07-17 ASSESSMENT — PAIN SCALES - GENERAL: PAINLEVEL_OUTOF10: 10

## 2018-07-18 ENCOUNTER — PATIENT OUTREACH (OUTPATIENT)
Dept: HEALTH INFORMATION MANAGEMENT | Facility: OTHER | Age: 78
End: 2018-07-18

## 2018-07-18 NOTE — ED PROVIDER NOTES
"ED Provider Note    CHIEF COMPLAINT   Chief Complaint   Patient presents with   • Knee Swelling     fell \"three years ago\" on R knee, pain returning    • Rash     LUE following shingles vaccine yesterday       HPI   Irina Conrad is a 78 y.o. female who presents with 2 complaints.  She has a small reddish skin patch left shoulder where she received shingles vaccination yesterday.  The areas described a slightly sore, mildly itching.  No fever.  No blistering or drainage, or proximal lymphangitis.  She denies left arthralgia in the shoulder.  Second complaint is swollen right knee, this is happened multiple times in the past.  Patient has had both drainage of the knee as well as injection of steroids into the knee.  She moved to [place] several months ago, does not currently have orthopedic specialist.  There is no new injury.  There is pain with walking.  No fever or redness to the right leg.    REVIEW OF SYSTEMS   Musculoskeletal: Acute on chronic pain right knee with swelling.  Neurologic: No headache  Skin: Red area at site of immunization yesterday left arm  Constitutional: No fever      PAST MEDICAL HISTORY   Past Medical History:   Diagnosis Date   • Bronchiectasis (HCC)    • Chronic cough     worse since Sept 2017   • Falls    • Knee pain, right        FAMILY HISTORY  Family History   Problem Relation Age of Onset   • Asthma Father    • No Known Problems Sister        SOCIAL HISTORY  Social History     Social History   • Marital status:      Spouse name: N/A   • Number of children: N/A   • Years of education: N/A     Occupational History   • cafe in [employer]    • originally from the [facility]      here since October 2017     Social History Main Topics   • Smoking status: Former Smoker     Quit date: 11/29/1986   • Smokeless tobacco: Never Used      Comment: smoked during first trimester of prenancy   • Alcohol use No   • Drug use: No   • Sexual activity: Not Currently     Other Topics Concern   • Not " "on file     Social History Narrative   • No narrative on file       SURGICAL HISTORY  Past Surgical History:   Procedure Laterality Date   • BRONCHOSCOPY-ENDO N/A 11/19/2017    Procedure: BRONCHOSCOPY-ENDO;  Surgeon: Chriss Goldberg M.D.;  [facility];  Service: Ent       CURRENT MEDICATIONS   Home Medications    **Home medications have not yet been reviewed for this encounter**         ALLERGIES   Allergies   Allergen Reactions   • Chocolate Shortness of Breath   • Chocolate Flavor Shortness of Breath       PHYSICAL EXAM  VITAL SIGNS: /84   Pulse 86   Temp 37.2 °C (98.9 °F)   Resp 16   Ht 1.448 m (4' 9\")   Wt 45.4 kg (100 lb)   SpO2 93%   BMI 21.64 kg/m²   Skin: 6 cm erythematous patch of skin with slight swelling left lateral deltoid region.  No pustules or vesicles.  Right knee shows no evidence of bruising, or erythematous change..   Vascular: Intact distal capillary refill.   Musculoskeletal: Tender right knee diffusely.  There is palpable effusion.  Right ankle and right hip are nontender  Neurologic: Sensation intact right foot        COURSE & MEDICAL DECISION MAKING  Pertinent Labs & Imaging studies reviewed. (See chart for details)  Patient is referred to orthopedics for exacerbation of chronic right knee arthritis.  No evidence of septic arthritis or gout as there is no erythematous change to the skin on the right knee.  Patient advised therapeutic drainage of fluid from the knee is not emergency procedure and she will need to establish care with local orthopedist.  Patient refused knee immobilizer.  Plan for Tylenol for pain.    The redness on the patient's left arm appears to be immunization reaction locally.  Does not appear to be cellulitis.  They are advised to return for fever, worsening redness or any concerns.  Patient received single dose of prednisone in the ER for help with pain and swelling.    FINAL IMPRESSION     1. Immunization reaction, initial " encounter    2. Effusion, right knee    3. Chronic pain of right knee              Electronically signed by: Cesar Colon, 7/18/2018 3:46 AM

## 2018-07-18 NOTE — DISCHARGE INSTRUCTIONS
Knee Effusion  Introduction  Knee effusion means that you have extra fluid in your knee. This can cause pain. Your knee may be more difficult to bend and move.  Follow these instructions at home:  · Use crutches as told by your doctor.  · Wear a knee brace as told by your doctor.  · Apply ice to the swollen area:  ¨ Put ice in a plastic bag.  ¨ Place a towel between your skin and the bag.  ¨ Leave the ice on for 20 minutes, 2-3 times per day.  · Keep your knee raised (elevated) when you are sitting or lying down.  · Take medicines only as told by your doctor.  · Do any rehabilitation or strengthening exercises as told by your doctor.  · Rest your knee as told by your doctor. You may start doing your normal activities again when your doctor says it is okay.  · Keep all follow-up visits as told by your doctor. This is important.  Contact a doctor if:  · You continue to have pain in your knee.  Get help right away if:  · You have increased swelling or redness of your knee.  · You have severe pain in your knee.  · You have a fever.  This information is not intended to replace advice given to you by your health care provider. Make sure you discuss any questions you have with your health care provider.  Document Released: 01/20/2012 Document Revised: 05/25/2017 Document Reviewed: 08/03/2015  © 2017 [employer]vier

## 2018-07-18 NOTE — ED NOTES
Pt given discharge and follow-up instructions. Pt and family member verbalized understanding. VSS. Pt wheeled out to lobby to wait with family members to pickup pt.

## 2018-07-18 NOTE — ED TRIAGE NOTES
"Chief Complaint   Patient presents with   • Knee Swelling     fell \"three years ago\" on R knee, pain returning    • Rash     LUE following shingles vaccine yesterday     Pt to triage in wheelchair w/ daughter, non-english speaking, daughter translating. Pt in NAD, R knee swollen w/ no erythema. Pt has circular rash to LUE. Pt placed back in lobby at this time.    Blood pressure 151/59, pulse 95, temperature 37.2 °C (98.9 °F), resp. rate 18, height 1.448 m (4' 9\"), weight 45.4 kg (100 lb), SpO2 92 %.      "

## 2018-11-15 ENCOUNTER — OFFICE VISIT (OUTPATIENT)
Dept: MEDICAL GROUP | Facility: PHYSICIAN GROUP | Age: 78
End: 2018-11-15
Payer: MEDICARE

## 2018-11-15 VITALS
HEIGHT: 57 IN | BODY MASS INDEX: 20.69 KG/M2 | DIASTOLIC BLOOD PRESSURE: 70 MMHG | OXYGEN SATURATION: 93 % | SYSTOLIC BLOOD PRESSURE: 150 MMHG | HEART RATE: 76 BPM | TEMPERATURE: 97.8 F | WEIGHT: 95.9 LBS

## 2018-11-15 DIAGNOSIS — Z23 NEED FOR VACCINATION: ICD-10-CM

## 2018-11-15 DIAGNOSIS — J47.9 BRONCHIECTASIS WITHOUT COMPLICATION (HCC): ICD-10-CM

## 2018-11-15 DIAGNOSIS — A31.0 MAI (MYCOBACTERIUM AVIUM-INTRACELLULARE) INFECTION (HCC): ICD-10-CM

## 2018-11-15 DIAGNOSIS — R03.0 ELEVATED BLOOD PRESSURE READING: ICD-10-CM

## 2018-11-15 PROCEDURE — 90732 PPSV23 VACC 2 YRS+ SUBQ/IM: CPT | Performed by: FAMILY MEDICINE

## 2018-11-15 PROCEDURE — 99214 OFFICE O/P EST MOD 30 MIN: CPT | Mod: 25 | Performed by: FAMILY MEDICINE

## 2018-11-15 PROCEDURE — G0008 ADMIN INFLUENZA VIRUS VAC: HCPCS | Performed by: FAMILY MEDICINE

## 2018-11-15 PROCEDURE — 90662 IIV NO PRSV INCREASED AG IM: CPT | Performed by: FAMILY MEDICINE

## 2018-11-15 PROCEDURE — G0009 ADMIN PNEUMOCOCCAL VACCINE: HCPCS | Performed by: FAMILY MEDICINE

## 2018-11-15 RX ORDER — AZITHROMYCIN 250 MG/1
250 TABLET, FILM COATED ORAL DAILY
COMMUNITY
End: 2019-06-17

## 2018-11-15 RX ORDER — RIFAMPIN 300 MG/1
CAPSULE ORAL
Qty: 40 CAP | Refills: 0 | Status: SHIPPED | OUTPATIENT
Start: 2018-11-15 | End: 2019-06-17

## 2018-11-15 NOTE — PROGRESS NOTES
"Subjective:      Irina Conrad is a 78 y.o. female who presents with Follow-Up (KAVITHA)            HPI     Patient is here for follow-up accompanied by her daughter.    She continues to take rifampin, ethambutol and azithromycin for KAVITHA prescribed by her pulmonologist.  These were started in March 2018 with instructions to take them for a total of 1 year.  She has bronchiectasis as a complication of the kavitha.  She said she has on and off cough but not more than the usual.  She thinks this is related to the cold weather.  She denies any fever, chills, shortness of breath.    She has no prior history of hypertension and today the blood pressure is elevated.  She states this could be because she has been consuming salty foods lately.  Denies any headache, dizziness, lightheadedness, chest pain, palpitations, shortness of breath.    Past medical history, past surgical history, family history reviewed-no changes    Social history reviewed-no changes    Allergies reviewed-no changes    Medications reviewed-no changes        ROS     As per HPI, the rest are negative.       Objective:     /70 (BP Location: Left arm, Patient Position: Sitting, BP Cuff Size: Adult)   Pulse 76   Temp 36.6 °C (97.8 °F) (Temporal)   Ht 1.448 m (4' 9\")   Wt 43.5 kg (95 lb 14.4 oz)   SpO2 93%   BMI 20.75 kg/m²      Physical Exam     Examined alert, awake, oriented, not in distress    Neck-supple, no lymphadenopathy, no thyromegaly  Lungs-clear to auscultation, scattered rales all lung fields which is an old finding consistent with bronchiectasis, no wheezes, good air exchange  Heart-regular rate and rhythm, no murmur  Extremities-no edema, clubbing, cyanosis            Assessment/Plan:     1. Need for vaccination  High-dose flu shot was given.  Pneumovax 23 given.  - INFLUENZA VACCINE, HIGH DOSE (65+ ONLY)  - Pneumococal Polysaccharide Vaccine 23-Valent =>1YO SQ/IM    2. KAVITHA (mycobacterium avium-intracellulare) infection (HCC)  She needs " refill of the rifampin until her next follow-up with pulmonary specialist and so refills were given and sent to pharmacy.  She will continue all the other medications which are ethambutol and azithromycin.  - riFAMPin (RIFADINE) 300 MG Cap; Take 1 cap by mouth 3 times a week (Monday, Wednesday and Friday)  Dispense: 40 Cap; Refill: 0    3. Bronchiectasis without complication (HCC)  Currently stable without exacerbation.    4. Elevated blood pressure reading  Advised to avoid salty foods and decrease his salt intake, exercise regularly and keep a healthy weight.  Monitor blood pressure at home and keep a record and recheck in a month.  If persistently elevated we may have to start blood pressure medication.      Please note that this dictation was created using voice recognition software. I have worked with consultants from the vendor as well as technical experts from [employer] to optimize the interface. I have made every reasonable attempt to correct obvious errors, but I expect that there are errors of grammar and possibly content I did not discover before finalizing the note.

## 2018-12-17 ENCOUNTER — OFFICE VISIT (OUTPATIENT)
Dept: MEDICAL GROUP | Facility: PHYSICIAN GROUP | Age: 78
End: 2018-12-17
Payer: MEDICARE

## 2018-12-17 VITALS
DIASTOLIC BLOOD PRESSURE: 70 MMHG | WEIGHT: 97.22 LBS | BODY MASS INDEX: 20.98 KG/M2 | HEIGHT: 57 IN | HEART RATE: 102 BPM | TEMPERATURE: 97.8 F | SYSTOLIC BLOOD PRESSURE: 128 MMHG | OXYGEN SATURATION: 91 %

## 2018-12-17 DIAGNOSIS — R03.0 ELEVATED BLOOD PRESSURE READING: ICD-10-CM

## 2018-12-17 DIAGNOSIS — A31.0 MAI (MYCOBACTERIUM AVIUM-INTRACELLULARE) INFECTION (HCC): ICD-10-CM

## 2018-12-17 DIAGNOSIS — J47.9 BRONCHIECTASIS WITHOUT COMPLICATION (HCC): ICD-10-CM

## 2018-12-17 DIAGNOSIS — E78.5 DYSLIPIDEMIA: ICD-10-CM

## 2018-12-17 PROCEDURE — 99213 OFFICE O/P EST LOW 20 MIN: CPT | Performed by: FAMILY MEDICINE

## 2018-12-17 NOTE — PROGRESS NOTES
"Subjective:      Irina Conrad is a 78 y.o. female who presents with Hypertension        HPI:      Patient is here accompanied by her daughter for follow-up of her blood pressure.  When I saw her a month ago her blood pressure was elevated at 150/70 without prior history of hypertension.  I did not start her medication yet and I had her manage this with watching her diet and exercise.  She states she has been managing her diet by staying away from fatty and salty foods. She has also cut back on carbohydrates such as rice and is staying away from salty foods.  She has been keeping herself more active by doing more household work.    Due for follow-up blood work.  Patient has mild elevation of LDL cholesterol with the last blood work in 12/17.  He is managing this with her own efforts.    In terms of her KAVITHA infection and bronchiectasis, she continues to take her antibiotics which are azithromycin, ethambutol and rifampin.  She is managed by the pulmonary specialist.      Past medical history, past surgical history, family history reviewed-no changes    Social history reviewed-no changes    Allergies reviewed-no changes    Medications reviewed-no changes      ROS:  As per the HPI as shown above, the rest are negative.       Objective:     /70 (BP Location: Left arm, Patient Position: Sitting, BP Cuff Size: Adult)   Pulse (!) 102   Temp 36.6 °C (97.8 °F) (Temporal)   Ht 1.435 m (4' 8.5\")   Wt 44.1 kg (97 lb 3.6 oz)   SpO2 91%   BMI 21.41 kg/m²     Physical Exam   Examined alert, awake, oriented, not in distress    Neck-supple, no lymphadenopathy, no thyromegaly  Lungs-clear to auscultation minimal scattered rales all lung fields, no wheezes  Heart-regular rate and rhythm, no murmur  Extremities-no edema, clubbing, cyanosis       Assessment/Plan:   1. Elevated blood pressure reading  She previously had elevated blood pressure levels on her last visit.  She was able to get her blood pressure down 120/68 " through dietary and exercise improvements. I have advised her to continue her improved diet and exercise regimen to keep her blood pressure at a steady level.  We will do follow-up blood work.  - Lipid Profile; Future  - COMP METABOLIC PANEL; Future  - CBC WITH DIFFERENTIAL; Future    2. Dyslipidemia  We will be repeating the patient's lab work as her LDL levels in the past were slightly elevated.  - Lipid Profile; Future  - COMP METABOLIC PANEL; Future  - CBC WITH DIFFERENTIAL; Future    3. Bronchiectasis without complication (HCC)  Stable.  She is followed by the pulmonary specialist on medications for KAVITHA.  - Lipid Profile; Future  - COMP METABOLIC PANEL; Future  - CBC WITH DIFFERENTIAL; Future    4. KAVITHA (mycobacterium avium-intracellulare) infection (HCC)  Currently stable on medications.  Followed by the pulmonary specialist.  We will follow along.  - Lipid Profile; Future  - COMP METABOLIC PANEL; Future  - CBC WITH DIFFERENTIAL; Future       I attest that I saw this patient on this date and due to technical issues am not showing as the author of the note.      ICesar (Scribe), am scribing for, and in the presence of, Poppy Freeman MD     Electronically signed by: Cesar Conte (Scribe), 12/17/2018    IPoppy MD personally performed the services described in this documentation, as scribed by Cesar Conte in my presence, and it is both accurate and complete.

## 2019-01-14 ENCOUNTER — HOSPITAL ENCOUNTER (OUTPATIENT)
Dept: LAB | Facility: MEDICAL CENTER | Age: 79
End: 2019-01-14
Attending: FAMILY MEDICINE
Payer: MEDICARE

## 2019-01-14 DIAGNOSIS — A31.0 MAI (MYCOBACTERIUM AVIUM-INTRACELLULARE) INFECTION (HCC): ICD-10-CM

## 2019-01-14 DIAGNOSIS — R03.0 ELEVATED BLOOD PRESSURE READING: ICD-10-CM

## 2019-01-14 DIAGNOSIS — J47.9 BRONCHIECTASIS WITHOUT COMPLICATION (HCC): ICD-10-CM

## 2019-01-14 DIAGNOSIS — E78.5 DYSLIPIDEMIA: ICD-10-CM

## 2019-01-14 LAB
ALBUMIN SERPL BCP-MCNC: 4.2 G/DL (ref 3.2–4.9)
ALBUMIN/GLOB SERPL: 1 G/DL
ALP SERPL-CCNC: 86 U/L (ref 30–99)
ALT SERPL-CCNC: 24 U/L (ref 2–50)
ANION GAP SERPL CALC-SCNC: 7 MMOL/L (ref 0–11.9)
AST SERPL-CCNC: 25 U/L (ref 12–45)
BASOPHILS # BLD AUTO: 0.5 % (ref 0–1.8)
BASOPHILS # BLD: 0.05 K/UL (ref 0–0.12)
BILIRUB SERPL-MCNC: 0.3 MG/DL (ref 0.1–1.5)
BUN SERPL-MCNC: 18 MG/DL (ref 8–22)
CALCIUM SERPL-MCNC: 9.7 MG/DL (ref 8.5–10.5)
CHLORIDE SERPL-SCNC: 100 MMOL/L (ref 96–112)
CHOLEST SERPL-MCNC: 183 MG/DL (ref 100–199)
CO2 SERPL-SCNC: 29 MMOL/L (ref 20–33)
CREAT SERPL-MCNC: 0.75 MG/DL (ref 0.5–1.4)
EOSINOPHIL # BLD AUTO: 0.21 K/UL (ref 0–0.51)
EOSINOPHIL NFR BLD: 2 % (ref 0–6.9)
ERYTHROCYTE [DISTWIDTH] IN BLOOD BY AUTOMATED COUNT: 42.9 FL (ref 35.9–50)
FASTING STATUS PATIENT QL REPORTED: NORMAL
GLOBULIN SER CALC-MCNC: 4.4 G/DL (ref 1.9–3.5)
GLUCOSE SERPL-MCNC: 93 MG/DL (ref 65–99)
HCT VFR BLD AUTO: 38.3 % (ref 37–47)
HDLC SERPL-MCNC: 53 MG/DL
HGB BLD-MCNC: 12.7 G/DL (ref 12–16)
IMM GRANULOCYTES # BLD AUTO: 0.04 K/UL (ref 0–0.11)
IMM GRANULOCYTES NFR BLD AUTO: 0.4 % (ref 0–0.9)
LDLC SERPL CALC-MCNC: 100 MG/DL
LYMPHOCYTES # BLD AUTO: 2.35 K/UL (ref 1–4.8)
LYMPHOCYTES NFR BLD: 22.4 % (ref 22–41)
MCH RBC QN AUTO: 32.4 PG (ref 27–33)
MCHC RBC AUTO-ENTMCNC: 33.2 G/DL (ref 33.6–35)
MCV RBC AUTO: 97.7 FL (ref 81.4–97.8)
MONOCYTES # BLD AUTO: 0.5 K/UL (ref 0–0.85)
MONOCYTES NFR BLD AUTO: 4.8 % (ref 0–13.4)
NEUTROPHILS # BLD AUTO: 7.32 K/UL (ref 2–7.15)
NEUTROPHILS NFR BLD: 69.9 % (ref 44–72)
NRBC # BLD AUTO: 0 K/UL
NRBC BLD-RTO: 0 /100 WBC
PLATELET # BLD AUTO: 470 K/UL (ref 164–446)
PMV BLD AUTO: 9.3 FL (ref 9–12.9)
POTASSIUM SERPL-SCNC: 3.6 MMOL/L (ref 3.6–5.5)
PROT SERPL-MCNC: 8.6 G/DL (ref 6–8.2)
RBC # BLD AUTO: 3.92 M/UL (ref 4.2–5.4)
SODIUM SERPL-SCNC: 136 MMOL/L (ref 135–145)
TRIGL SERPL-MCNC: 149 MG/DL (ref 0–149)
WBC # BLD AUTO: 10.5 K/UL (ref 4.8–10.8)

## 2019-01-14 PROCEDURE — 80061 LIPID PANEL: CPT

## 2019-01-14 PROCEDURE — 36415 COLL VENOUS BLD VENIPUNCTURE: CPT

## 2019-01-14 PROCEDURE — 85025 COMPLETE CBC W/AUTO DIFF WBC: CPT

## 2019-01-14 PROCEDURE — 80053 COMPREHEN METABOLIC PANEL: CPT

## 2019-01-15 ENCOUNTER — TELEPHONE (OUTPATIENT)
Dept: MEDICAL GROUP | Facility: PHYSICIAN GROUP | Age: 79
End: 2019-01-15

## 2019-01-15 NOTE — TELEPHONE ENCOUNTER
----- Message from Poppy Freeman M.D. sent at 1/14/2019  6:45 PM PST -----  Blood work came back LDL or bad cholesterol improved and decreased from 105-100.  The goal is below 100 so this is almost at goal.  Continue to avoid fatty foods, eat more fruits and vegetables, try to have fish 3 times a week, exercise regularly.  No diabetes.  Liver and kidney functions are normal.  No anemia.  The white cell count has improved and this is now back to normal.  Platelet count still mildly elevated and stable.  I will continue to keep an eye on this.   Keep appointmen in June.

## 2019-01-15 NOTE — TELEPHONE ENCOUNTER
Phone Number Called: [phone] (home)       Message: Left voice mail for the pt re results. and Informed pt to call back     Left Message for patient to call back: yes

## 2019-01-16 NOTE — TELEPHONE ENCOUNTER
Results given to the pt. The pt has no further questions or concerns.   pt advised to call or schedule an appointment for any further concerns or questions.

## 2019-03-15 ENCOUNTER — APPOINTMENT (OUTPATIENT)
Dept: PULMONOLOGY | Facility: HOSPICE | Age: 79
End: 2019-03-15
Payer: MEDICARE

## 2019-03-27 ENCOUNTER — OFFICE VISIT (OUTPATIENT)
Dept: PULMONOLOGY | Facility: HOSPICE | Age: 79
End: 2019-03-27
Payer: MEDICARE

## 2019-03-27 VITALS
TEMPERATURE: 98.6 F | OXYGEN SATURATION: 91 % | DIASTOLIC BLOOD PRESSURE: 76 MMHG | WEIGHT: 99.2 LBS | SYSTOLIC BLOOD PRESSURE: 112 MMHG | HEART RATE: 62 BPM | HEIGHT: 59 IN | BODY MASS INDEX: 20 KG/M2 | RESPIRATION RATE: 14 BRPM

## 2019-03-27 DIAGNOSIS — A31.0 MAI (MYCOBACTERIUM AVIUM-INTRACELLULARE) INFECTION (HCC): Primary | ICD-10-CM

## 2019-03-27 DIAGNOSIS — R93.89 ABNORMAL CHEST CT: ICD-10-CM

## 2019-03-27 DIAGNOSIS — R06.02 SOB (SHORTNESS OF BREATH): ICD-10-CM

## 2019-03-27 DIAGNOSIS — J47.9 BRONCHIECTASIS WITHOUT COMPLICATION (HCC): ICD-10-CM

## 2019-03-27 DIAGNOSIS — R05.9 COUGH: ICD-10-CM

## 2019-03-27 PROCEDURE — 99214 OFFICE O/P EST MOD 30 MIN: CPT | Performed by: INTERNAL MEDICINE

## 2019-03-27 ASSESSMENT — PAIN SCALES - GENERAL: PAINLEVEL: NO PAIN

## 2019-03-29 ENCOUNTER — HOSPITAL ENCOUNTER (OUTPATIENT)
Facility: MEDICAL CENTER | Age: 79
End: 2019-03-29
Attending: INTERNAL MEDICINE
Payer: MEDICARE

## 2019-03-29 DIAGNOSIS — R05.9 COUGH: ICD-10-CM

## 2019-03-29 DIAGNOSIS — J47.9 BRONCHIECTASIS WITHOUT COMPLICATION (HCC): ICD-10-CM

## 2019-03-29 DIAGNOSIS — R06.02 SOB (SHORTNESS OF BREATH): ICD-10-CM

## 2019-03-29 DIAGNOSIS — R93.89 ABNORMAL CHEST CT: ICD-10-CM

## 2019-03-29 PROCEDURE — 87206 SMEAR FLUORESCENT/ACID STAI: CPT

## 2019-03-29 PROCEDURE — 87116 MYCOBACTERIA CULTURE: CPT

## 2019-03-29 PROCEDURE — 87015 SPECIMEN INFECT AGNT CONCNTJ: CPT

## 2019-03-29 NOTE — PROGRESS NOTES
Chief Complaint   Patient presents with   • Follow-Up     Review Lab results       HPI:  The patient is a 77-year-old woman who was hospitalized in November 2017 with fever, chills, cough and a right upper lobe infiltrate. She was placed in isolation and multiple AFB smears were performed. All her smears were negative. However, subsequently she  grew KAVITHA in 2 of the specimens. She was also noted to have Pseudomonas growing in one of her specimens from bronchoscopy.  The patient says she is  feeling better. She was born in the [facility]. She has lived in  for about 10 years but visits the [facility] on a regular basis. She says that she has never had tuberculosis. Her Quantiferon Gold was nonreactive.  Her chest x-ray shows:  Increased bilateral diffuse interstitial markings are not significantly changed. Bronchiectasis is again noted. More confluent opacification in the right upper lobe has improved. No significant pleural fluid or pneumothorax.  Her chest CT shows:  1.  Consolidation is identified which is prominent in the right lung apex. Findings could be due to pneumonia. Tuberculosis is a possibility.    2.  Surrounding groundglass opacification is also noted in the right upper lobe.    3.  Additional scattered multifocal opacifications are noted in each lung. Bronchiectasis is also present. Findings are likely due to infectious or inflammatory process.    4.  Mildly enlarged lymph nodes are noted in the upper mediastinum and pretracheal area.     She does have a chronic cough. She says the cough started in September 2017 after she returned from a visit to the [facility]. She does complain of occasional wheezing. She also says she occasionally gets shortness of breath. She says she is a never smoker. It appears that she lives half the year in [place] and half the year in [place].  The patient says she is otherwise well.     After her last visit we asked the lab to perform macrolide sensitivity testing on  her KAVITHA specimens. The specimens are sensitive to macrolides.  In March 2018 we started her on therapy with azithromycin and ethambutol.  We introduced the medications gradually over a 3-week.  She has continued on these medications.  The daughter tells me that the medications were started in June 2018.  The medications were requested to be started in March 2018 she has been tolerating the medications without problems. She has not had any GI distress.  She has no complaints of hearing or visual problems.  I did request audiometry and an eye examination but these were not accomplished.  Recent liver function studies were normal.  We asked to see her back in 1 month after starting her medications.  It has been 12 months since we have seen her.      Past Medical History:   Diagnosis Date   • Bronchiectasis (HCC)    • Chronic cough     worse since Sept 2017   • Falls    • Knee pain, right        ROS:   Constitutional: Denies fevers, chills, night sweats, fatigue or weight loss  Eyes: Denies vision loss, pain, drainage, double vision  Ears, Nose, Throat: Denies earache, tinnitus, hoarseness  Cardiovascular: Denies chest pain, tightness, palpitations  Respiratory: See HPI  Sleep: Denies, snoring, apnea  GI: Denies abdominal pain, nausea, vomiting, diarrhea  : Denies frequent urination, hematuria, painful urination  Musculoskeletal: Denies back pain, painful joints, sore muscles  Neurological: Denies headaches, seizures  Skin: Denies rashes, color changes  Psychiatric: Denies depression or thoughts of suicide  Hematologic: Denies bleeding tendency or clotting tendency  Allergic/Immunologic: Denies rhinitis, skin sensitivity    Social History     Social History   • Marital status:      Spouse name: N/A   • Number of children: N/A   • Years of education: N/A     Occupational History   • 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[facility]    • originally from the [facility]      here since October 2017     Social History Main Topics   • Smoking  "status: Former Smoker     Quit date: 11/29/1986   • Smokeless tobacco: Never Used      Comment: smoked during first trimester of prenancy   • Alcohol use No   • Drug use: No   • Sexual activity: Not Currently     Other Topics Concern   • Not on file     Social History Narrative   • No narrative on file     Chocolate and Chocolate flavor  Current Outpatient Prescriptions on File Prior to Visit   Medication Sig Dispense Refill   • ethambutol (MYAMBUTOL) 400 MG Tab TAKE 2 TABLETS BY MOUTH 3 TIMES A WEEK ON MONDAY WEDNESDAY AND FRIDAY 80 Tab 0   • riFAMPin (RIFADINE) 300 MG Cap Take 1 cap by mouth 3 times a week (Monday, Wednesday and Friday) 40 Cap 0   • tetanus-dipth-acell pertussis (ADACEL) 5-2-15.5 LF-MCG/0.5 Suspension 0.5 mL by Intramuscular route Once PRN for up to 1 dose. 0.5 mL 0   • umeclidinium-vilanterol (ANORO ELLIPTA) 62.5-25 MCG/INH AEROSOL POWDER, BREATH ACTIVATED inhaler Inhale 1 Puff by mouth every day.     • azithromycin (ZITHROMAX) 250 MG Tab Take 250 mg by mouth every day.       No current facility-administered medications on file prior to visit.      Blood pressure 112/76, pulse 62, temperature 37 °C (98.6 °F), temperature source Oral, resp. rate 14, height 1.486 m (4' 10.5\"), weight 45 kg (99 lb 3.2 oz), SpO2 91 %, not currently breastfeeding.  Family History   Problem Relation Age of Onset   • Asthma Father    • No Known Problems Sister        Physical Exam:  Small woman.  No distress at rest  HEENT: PERRLA, EOMI, no scleral icterus, no nasal or oral lesions  Neck: No thyromegaly, no adenopathy, no bruits  Mallampatti: Grade II  Lungs: She has some soft diffuse bilateral crackles  Heart: Regular rate and rhythm, no gallops or murmurs  Abdomen: Soft, benign, no organomegaly  Extremities: No clubbing, cyanosis, or edema  Neurologic: Cranial nerve, motor, and sensory exam are normal    1. KAVITHA (mycobacterium avium-intracellulare) infection (HCC)    2. Bronchiectasis without complication (HCC)    3. " Cough    4. SOB (shortness of breath)    5. Abnormal chest CT        She has tolerated 3 drug treatment over the past 9-12 months.  We will make another attempt at checking audiometry and visual acuity.  Obtain 3 sputum for AFB  We have made an appointment for her in June and will check liver function studies just prior to her appointment.  We will also check a repeat thoracic CT at that time.  The daughter understands to stop the medication if there are any problems with jaundice or abdominal pain.   Patient denies visual changes, denies diplopia or blurry vision.

## 2019-03-30 ENCOUNTER — HOSPITAL ENCOUNTER (OUTPATIENT)
Facility: MEDICAL CENTER | Age: 79
End: 2019-03-30
Attending: INTERNAL MEDICINE
Payer: MEDICARE

## 2019-03-30 DIAGNOSIS — A31.0 MAI (MYCOBACTERIUM AVIUM-INTRACELLULARE) INFECTION (HCC): ICD-10-CM

## 2019-03-30 DIAGNOSIS — J47.9 BRONCHIECTASIS WITHOUT COMPLICATION (HCC): ICD-10-CM

## 2019-03-30 LAB
RHODAMINE-AURAMINE STN SPEC: NORMAL
SIGNIFICANT IND 70042: NORMAL
SITE SITE: NORMAL
SOURCE SOURCE: NORMAL

## 2019-03-30 PROCEDURE — 87015 SPECIMEN INFECT AGNT CONCNTJ: CPT

## 2019-03-30 PROCEDURE — 87206 SMEAR FLUORESCENT/ACID STAI: CPT

## 2019-03-30 PROCEDURE — 87116 MYCOBACTERIA CULTURE: CPT

## 2019-04-01 ENCOUNTER — HOSPITAL ENCOUNTER (OUTPATIENT)
Facility: MEDICAL CENTER | Age: 79
End: 2019-04-01
Attending: INTERNAL MEDICINE
Payer: MEDICARE

## 2019-04-01 DIAGNOSIS — J47.9 BRONCHIECTASIS WITHOUT COMPLICATION (HCC): ICD-10-CM

## 2019-04-01 DIAGNOSIS — A31.0 MAI (MYCOBACTERIUM AVIUM-INTRACELLULARE) INFECTION (HCC): ICD-10-CM

## 2019-04-01 PROCEDURE — 87206 SMEAR FLUORESCENT/ACID STAI: CPT

## 2019-04-01 PROCEDURE — 87015 SPECIMEN INFECT AGNT CONCNTJ: CPT

## 2019-04-01 PROCEDURE — 87116 MYCOBACTERIA CULTURE: CPT

## 2019-04-15 ENCOUNTER — TELEPHONE (OUTPATIENT)
Dept: PULMONOLOGY | Facility: HOSPICE | Age: 79
End: 2019-04-15

## 2019-04-15 NOTE — TELEPHONE ENCOUNTER
Anais from the lab called and stated that the patient's sputum sample is positive for MGIT cube and was sent to [facility] lab for Accu Probe ID.  She will call back with the final results.

## 2019-04-18 ENCOUNTER — TELEPHONE (OUTPATIENT)
Dept: PULMONOLOGY | Facility: HOSPICE | Age: 79
End: 2019-04-18

## 2019-04-18 NOTE — TELEPHONE ENCOUNTER
Received a call from [employer] in [facility] with results that were reported out [facility]. TIEN is negative from a AFB culture that was collected on 3/30/19 ( see Lab-tab). Message relayed to supervisor.

## 2019-05-18 ENCOUNTER — APPOINTMENT (OUTPATIENT)
Dept: RADIOLOGY | Facility: IMAGING CENTER | Age: 79
End: 2019-05-18
Attending: PHYSICIAN ASSISTANT
Payer: MEDICARE

## 2019-05-18 ENCOUNTER — OFFICE VISIT (OUTPATIENT)
Dept: URGENT CARE | Facility: CLINIC | Age: 79
End: 2019-05-18
Payer: MEDICARE

## 2019-05-18 VITALS
SYSTOLIC BLOOD PRESSURE: 124 MMHG | HEART RATE: 70 BPM | BODY MASS INDEX: 19.52 KG/M2 | WEIGHT: 93 LBS | RESPIRATION RATE: 18 BRPM | DIASTOLIC BLOOD PRESSURE: 68 MMHG | TEMPERATURE: 98.1 F | HEIGHT: 58 IN | OXYGEN SATURATION: 96 %

## 2019-05-18 DIAGNOSIS — S86.911A STRAIN OF RIGHT KNEE, INITIAL ENCOUNTER: ICD-10-CM

## 2019-05-18 DIAGNOSIS — R05.9 COUGH: ICD-10-CM

## 2019-05-18 PROCEDURE — 71046 X-RAY EXAM CHEST 2 VIEWS: CPT | Mod: TC | Performed by: PHYSICIAN ASSISTANT

## 2019-05-18 PROCEDURE — 99214 OFFICE O/P EST MOD 30 MIN: CPT | Mod: 25 | Performed by: PHYSICIAN ASSISTANT

## 2019-05-18 PROCEDURE — 73564 X-RAY EXAM KNEE 4 OR MORE: CPT | Mod: 26,RT | Performed by: PHYSICIAN ASSISTANT

## 2019-05-18 PROCEDURE — 20610 DRAIN/INJ JOINT/BURSA W/O US: CPT | Performed by: PHYSICIAN ASSISTANT

## 2019-05-18 RX ORDER — BENZONATATE 100 MG/1
100 CAPSULE ORAL 3 TIMES DAILY PRN
Qty: 60 CAP | Refills: 0 | Status: SHIPPED | OUTPATIENT
Start: 2019-05-18 | End: 2019-06-17

## 2019-05-18 RX ORDER — AZITHROMYCIN 250 MG/1
TABLET, FILM COATED ORAL
Qty: 1 QUANTITY SUFFICIENT | Refills: 0 | Status: SHIPPED | OUTPATIENT
Start: 2019-05-18 | End: 2019-06-17

## 2019-05-18 ASSESSMENT — ENCOUNTER SYMPTOMS
WHEEZING: 0
COUGH: 1
SORE THROAT: 0
ABDOMINAL PAIN: 0
FALLS: 0
DIARRHEA: 0
SPUTUM PRODUCTION: 0
FEVER: 0
CHILLS: 0
VOMITING: 0
SHORTNESS OF BREATH: 0
NAUSEA: 0

## 2019-05-18 ASSESSMENT — PAIN SCALES - GENERAL: PAINLEVEL: 9=SEVERE PAIN

## 2019-05-18 NOTE — PROGRESS NOTES
"  Subjective:     Irina Conrad is a 79 y.o. female who presents for Knee Pain (right knee 2 days,  cough for 3 days )       Knee Pain   This is a new problem. The current episode started in the past 7 days. Associated symptoms include coughing. Pertinent negatives include no abdominal pain, chills, congestion, fever, nausea, rash, sore throat or vomiting.     Patient brought to clinic with daughter present.  Together she describes fairly acute on chronic problems.  Patient does have history of chronic cough secondary to Mycobacterium avium intracellulare -she has been on multiple medications and antibiotics for long-term pulmonologist's direction.  She has a pending CT scan of scheduled in the next 2 to 3 weeks to determine improvement and/or the need to continue with prior long-term medication regimen -patient's daughter inquires about scheduling of CT chart shows indicated for \"early June\" per pulmonology's direction patient's encouraged to contact pulmonologist's office Monday morning to determine if CT could be performed sooner.  Daughter denies recent worsening of persistent dry cough.  Denies wheeze or production.  Denies fevers or chills.  Denies lethargy or change in appetite or activity.    Daughter notes right knee pain.  Again acute on chronic condition.  She notes patient has had multiple falls over the last 2 decades contributing to right knee pain.  Denies recent trauma or injury or fall.  Notes past medical history of good control of knee pain with corticosteroid intra-articular injection last was performed around 2 years ago.  They deny catching locking.  Complains of mild swelling to right knee and achy pain.    Past Medical History:   Diagnosis Date   • Bronchiectasis (HCC)    • Chronic cough     worse since Sept 2017   • Falls    • Knee pain, right      Past Surgical History:   Procedure Laterality Date   • BRONCHOSCOPY-ENDO N/A 11/19/2017    Procedure: BRONCHOSCOPY-ENDO;  Surgeon: Chriss ANDREWS" "Green, M.D.;  Location: 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[facility]    • originally from the [place] [facility]      here since October 2017     Social History Main Topics   • Smoking status: Former Smoker     Quit date: 11/29/1986   • Smokeless tobacco: Never Used      Comment: smoked during first trimester of prenancy   • Alcohol use No   • Drug use: No   • Sexual activity: Not Currently     Other Topics Concern   • Not on file     Social History Narrative   • No narrative on file      Family History   Problem Relation Age of Onset   • Asthma Father    • No Known Problems Sister     Review of Systems   Constitutional: Negative for chills and fever.   HENT: Negative for congestion, ear pain and sore throat.    Respiratory: Positive for cough. Negative for sputum production, shortness of breath and wheezing.    Gastrointestinal: Negative for abdominal pain, diarrhea, nausea and vomiting.   Musculoskeletal: Positive for joint pain ( POS for right knee pain). Negative for falls ( not recently).   Skin: Negative for rash.     Allergies   Allergen Reactions   • Chocolate Shortness of Breath   • Chocolate Flavor Shortness of Breath      Objective:   /68 (BP Location: Right arm, Patient Position: Sitting, BP Cuff Size: Adult)   Pulse 70   Temp 36.7 °C (98.1 °F) (Temporal)   Resp 18   Ht 1.473 m (4' 10\")   Wt 42.2 kg (93 lb)   SpO2 96%   BMI 19.44 kg/m²   Physical Exam   Constitutional: She is oriented to person, place, and time. She appears well-developed and well-nourished. No distress.   HENT:   Head: Normocephalic and atraumatic.   Right Ear: Tympanic membrane, external ear and ear canal normal.   Left Ear: Tympanic membrane, external ear and ear canal normal.   Nose: Nose normal.   Mouth/Throat: Uvula is midline and mucous " membranes are normal. Posterior oropharyngeal erythema ( mild PND) present. No oropharyngeal exudate, posterior oropharyngeal edema or tonsillar abscesses.   Eyes: Conjunctivae and lids are normal. Right eye exhibits no discharge. Left eye exhibits no discharge. No scleral icterus.   Neck: Neck supple.   Pulmonary/Chest: Effort normal. No respiratory distress. She has no decreased breath sounds ( distant sounding but present). She has no wheezes (dry). She has no rhonchi. She has no rales.   Musculoskeletal: Normal range of motion.        Right knee: She exhibits effusion. Tenderness found.   Lymphadenopathy:     She has cervical adenopathy ( mild bilat).   Neurological: She is alert and oriented to person, place, and time. She is not disoriented.   Skin: Skin is warm and dry. She is not diaphoretic. No erythema. No pallor.   Psychiatric: Her speech is normal and behavior is normal.   Nursing note and vitals reviewed.  dx CXR - Impression       Chronic diffuse interstitial infiltrates. Consideration should be given for chronic interstitial lung disease as well as interstitial edema and atypical pneumonia.   Reading Provider Reading Date   Ilya Arriola M.D. May 18, 2019   Signing Provider Signing Date Signing Time   Ilya Arriola M.D. May 18, 2019        Dx knee - Impression       Severe patellofemoral, mild medial femorotibial osteoarthritis    Probable joint effusion.    Rotated lateral radiograph decreases the efficacy of the projection    Mild chondrocalcinosis most likely is age-related, favored over CPPD   Reading Provider Reading Date   Lowell Hooper M.D. May 18, 2019   Signing Provider Signing Date Signing Time   Lowell Hooper M.D. May 18, 2019 12:44 PM     INJECTION:   Under verbally informed consent and sterile procedures patient's right knee is prepped w/ sterile solution, from superolateral aspect an injection of 1cc kenalog 40/2cc lido is performed.  Injection site covered w/ sterile bandage.   Pt tolerates injection well.        Assessment/Plan:   Assessment    1. Cough  - DX-CHEST-2 VIEWS; Future  - benzonatate (TESSALON) 100 MG Cap; Take 1 Cap by mouth 3 times a day as needed for Cough.  Dispense: 60 Cap; Refill: 0  - azithromycin (ZITHROMAX) 250 MG Tab; Take as directed on package. Dispense one package.  Dispense: 1 Quantity Sufficient; Refill: 0    2. Strain of right knee, initial encounter  - DX-KNEE COMPLETE 4+ RIGHT; Future    Supportive care is reviewed with patient/caregiver - recommend to push PO fluids and electrolytes, w/ concern for atelectasis will cover w/ azithromycin, has planned f/u w/ pulmonology for CT chest to further elucidate need for longer term abx coverage for atypical pathogens - call pulm next week for info regarding timeframe of next CT --     Right knee intra-articular injection performed in clinic with patient tolerating well.  Market improved right knee pain following injection patient cautioned for temporizing nature of corticosteroid intra-articular injections as well as the nature of somewhat diminished returns to be expected over time -- f/u w/ PCP and / or orthopedics if needed, ice, elevation, rest      Differential diagnosis, natural history, supportive care, and indications for immediate follow-up discussed.

## 2019-06-12 ENCOUNTER — HOSPITAL ENCOUNTER (OUTPATIENT)
Dept: RADIOLOGY | Facility: MEDICAL CENTER | Age: 79
End: 2019-06-12
Attending: INTERNAL MEDICINE
Payer: MEDICARE

## 2019-06-12 DIAGNOSIS — J47.9 BRONCHIECTASIS WITHOUT COMPLICATION (HCC): ICD-10-CM

## 2019-06-12 DIAGNOSIS — R93.89 ABNORMAL CHEST CT: ICD-10-CM

## 2019-06-12 PROCEDURE — 71250 CT THORAX DX C-: CPT

## 2019-06-17 ENCOUNTER — HOSPITAL ENCOUNTER (OUTPATIENT)
Dept: LAB | Facility: MEDICAL CENTER | Age: 79
End: 2019-06-17
Attending: INTERNAL MEDICINE
Payer: MEDICARE

## 2019-06-17 ENCOUNTER — OFFICE VISIT (OUTPATIENT)
Dept: MEDICAL GROUP | Facility: PHYSICIAN GROUP | Age: 79
End: 2019-06-17
Payer: MEDICARE

## 2019-06-17 VITALS
HEART RATE: 83 BPM | HEIGHT: 58 IN | BODY MASS INDEX: 19.11 KG/M2 | OXYGEN SATURATION: 90 % | TEMPERATURE: 97.5 F | DIASTOLIC BLOOD PRESSURE: 70 MMHG | SYSTOLIC BLOOD PRESSURE: 144 MMHG | WEIGHT: 91.05 LBS

## 2019-06-17 DIAGNOSIS — J47.9 BRONCHIECTASIS WITHOUT COMPLICATION (HCC): ICD-10-CM

## 2019-06-17 DIAGNOSIS — A31.0 MAI (MYCOBACTERIUM AVIUM-INTRACELLULARE) INFECTION (HCC): ICD-10-CM

## 2019-06-17 DIAGNOSIS — R03.0 ELEVATED BLOOD PRESSURE READING: ICD-10-CM

## 2019-06-17 DIAGNOSIS — E78.5 DYSLIPIDEMIA: ICD-10-CM

## 2019-06-17 LAB
ALBUMIN SERPL BCP-MCNC: 3.9 G/DL (ref 3.2–4.9)
ALBUMIN/GLOB SERPL: 1 G/DL
ALP SERPL-CCNC: 80 U/L (ref 30–99)
ALT SERPL-CCNC: 15 U/L (ref 2–50)
ANION GAP SERPL CALC-SCNC: 10 MMOL/L (ref 0–11.9)
AST SERPL-CCNC: 23 U/L (ref 12–45)
BILIRUB SERPL-MCNC: 0.4 MG/DL (ref 0.1–1.5)
BUN SERPL-MCNC: 20 MG/DL (ref 8–22)
CALCIUM SERPL-MCNC: 9.3 MG/DL (ref 8.5–10.5)
CHLORIDE SERPL-SCNC: 99 MMOL/L (ref 96–112)
CO2 SERPL-SCNC: 28 MMOL/L (ref 20–33)
CREAT SERPL-MCNC: 0.92 MG/DL (ref 0.5–1.4)
FASTING STATUS PATIENT QL REPORTED: NORMAL
GLOBULIN SER CALC-MCNC: 4.1 G/DL (ref 1.9–3.5)
GLUCOSE SERPL-MCNC: 94 MG/DL (ref 65–99)
POTASSIUM SERPL-SCNC: 4.3 MMOL/L (ref 3.6–5.5)
PROT SERPL-MCNC: 8 G/DL (ref 6–8.2)
SODIUM SERPL-SCNC: 137 MMOL/L (ref 135–145)

## 2019-06-17 PROCEDURE — 80053 COMPREHEN METABOLIC PANEL: CPT

## 2019-06-17 PROCEDURE — 99214 OFFICE O/P EST MOD 30 MIN: CPT | Performed by: FAMILY MEDICINE

## 2019-06-17 PROCEDURE — 36415 COLL VENOUS BLD VENIPUNCTURE: CPT

## 2019-06-17 RX ORDER — ETHAMBUTOL HYDROCHLORIDE 400 MG/1
TABLET, FILM COATED ORAL
Qty: 80 TAB | Refills: 0 | Status: SHIPPED | OUTPATIENT
Start: 2019-06-17 | End: 2019-06-17

## 2019-06-17 ASSESSMENT — PATIENT HEALTH QUESTIONNAIRE - PHQ9: CLINICAL INTERPRETATION OF PHQ2 SCORE: 0

## 2019-06-17 NOTE — TELEPHONE ENCOUNTER
Have we ever prescribed this med? Yes.  If yes, what date? 03/29/2019    Last OV: 03/27/2019    Next OV: 07/12/2019    DX: Bronchiectasis without complication (HCC) (J47.9)    Medications: umeclidinium-vilanterol (ANORO ELLIPTA) 62.5-25 MCG/INH AEROSOL POWDER, BREATH ACTIVATED inhaler [[id number]]

## 2019-06-17 NOTE — PROGRESS NOTES
"Subjective:      Irina Conrad is a 79 y.o. female who presents with Hypertension      HPI:    Patient presents today for follow-up of her chronic medical problems. She is accompanied by her daughter.    Elevated blood pressure  She does not currently take any medications for this. Blood pressure levels in the office today are slightly elevated at 144/70. This is not quite as elevated as her last appointment at 150/70. Upon recheck, it is unchanged.    Dyslipidemia  She continues to manage her dyslipidemia through her own efforts. Blood work was completed prior to this visit.     KAVITHA (mycobacterium avium-intracellulare) infection (HCC)  Bronchiectasis without complication  She was taking the Azithromycin, Ethambutol, and Rifampin for one year for this issue as prescribed by pulmonary. She has not taken this for the past 2 months as she ran out. She has not tried contacting their office yet. According to Dr. Collazo's notes, she was to continue her current medications and stop if she developed any issues. She completed  CT scan and sputum cultures as well. They completed a hearing test, and she requires a hearing aid. She has an appointment to complete her eye exam. Today, she continues to have a mild cough with grey sputum production.         Past medical history, past surgical history, family history reviewed-no changes    Social history reviewed-no changes    Allergies reviewed-no changes    Medications reviewed-no changes      ROS:  As per the HPI as shown above, the rest are negative.       Objective:     /70 (BP Location: Left arm, Patient Position: Sitting, BP Cuff Size: Adult)   Pulse 83   Temp 36.4 °C (97.5 °F) (Temporal)   Ht 1.473 m (4' 10\")   Wt 41.3 kg (91 lb 0.8 oz)   SpO2 90%   BMI 19.03 kg/m²     Physical Exam    Examined alert, awake, oriented, not in distress    Neck-supple, no lymphadenopathy, no thyromegaly  Lungs-clear to auscultation, rales both lung fields which is an old finding, no " wheezes  Heart-regular rate and rhythm, no murmur  Extremities-no edema, clubbing, cyanosis    TECHNIQUE/EXAM DESCRIPTION:  CT scan of the chest without contrast.    Noncontrast helical scanning of the chest was obtained from the lung apices through the adrenal glands.    Low dose optimization technique was utilized for this CT exam including automated exposure control and adjustment of the mA and/or kV according to patient size.    COMPARISON: 2 view chest 5/18/2019 and CT chest 11/6/2017    FINDINGS:  No significant bony abnormality is present.    Right upper lobe present in 2017 has resolved.    There are multifocal areas of airspace process/consolidation. These predominate in the upper lobes. They are present in all lobes and segments.    Additionally, there is cavitation within the superior segment of the right lower lobe.    There is bronchiectasis within all lobes. Predominates within the right middle lobe and lingula.    Other than the resolution of right upper lobe consolidation there has been overall worsening.    There is no adenopathy or mass within the axilla, mediastinum, or barbie.    There is aortic atherosclerotic plaque.    There no pleural effusion or pneumothoraces.    The visualized portions of the upper abdomen are within normal limits.       Impression       1.  Multifocal airspace process/consolidation    2.  Bronchiectasis in all lobes    3.  Cavitary consolidation within the superior segment right lower lobe    4.  Overall, other than resolution of right upper lobe consolidation there has been worsening    5.  Aortic atherosclerotic plaque          Assessment/Plan:     1. KAVITHA (mycobacterium avium-intracellulare) infection (HCC)  2. Bronchiectasis without complication (HCC)  She has not been taking her Azithromycin, ethambutol, and Rifadine for the past two months. Repeat CT-chest completed 5 days ago shows overall worsening.  Sputum culture positive for Mycobacterium other than tuberculosis 1  out of 3 specimens.  Strongly advised her daughter to contact Dr. Collazo's office as he may want the patient start back on the medications prior to her next follow-up with him in July.     3. Elevated blood pressure reading  We will continue to monitor this issue. Advised to avoid salty foods.  Monitor blood pressure at home and keep a record.  Consider starting medication if this continues to run high.    4. Dyslipidemia  Lipid panel values were mostly all within goal aside from borderline elevated LDL at 100 in January 2019. She will continue to manage this through her own efforts. I have advised the patient to increase her exercise regimen and avoid fatty foods. Labs have been ordered for the next follow up visit.          Cesar QUISPE ([employer]), am scribing for, and in the presence of, Poppy Freeman MD     Electronically signed by: Cesar Conte ([employer]), 6/17/2019    Poppy QUISPE MD personally performed the services described in this documentation, as scribed by Cesar Conte in my presence, and it is both accurate and complete.

## 2019-06-17 NOTE — TELEPHONE ENCOUNTER
Have we ever prescribed this med? Yes.  If yes, what date? 03/25/2019    Last OV: 03/27/2019    Next OV: 07/12/2019    DX: Bronchitis   Medications: ethambutol (MYAMBUTOL) 400 MG Tab [[id number]]

## 2019-06-22 ENCOUNTER — OFFICE VISIT (OUTPATIENT)
Dept: URGENT CARE | Facility: CLINIC | Age: 79
End: 2019-06-22
Payer: MEDICARE

## 2019-06-22 VITALS
DIASTOLIC BLOOD PRESSURE: 70 MMHG | HEIGHT: 58 IN | TEMPERATURE: 97.9 F | HEART RATE: 82 BPM | SYSTOLIC BLOOD PRESSURE: 100 MMHG | RESPIRATION RATE: 12 BRPM | OXYGEN SATURATION: 94 % | BODY MASS INDEX: 19.1 KG/M2 | WEIGHT: 91 LBS

## 2019-06-22 DIAGNOSIS — S46.912A STRAIN OF LEFT SHOULDER, INITIAL ENCOUNTER: Primary | ICD-10-CM

## 2019-06-22 PROCEDURE — 99214 OFFICE O/P EST MOD 30 MIN: CPT | Performed by: PHYSICIAN ASSISTANT

## 2019-06-22 RX ORDER — IBUPROFEN 200 MG
200 TABLET ORAL EVERY 6 HOURS PRN
COMMUNITY
End: 2020-05-08

## 2019-06-22 ASSESSMENT — ENCOUNTER SYMPTOMS
FEVER: 0
CONSTIPATION: 0
ABDOMINAL PAIN: 0
COUGH: 0
CHILLS: 0
FALLS: 0
TINGLING: 0
FATIGUE: 0
DIARRHEA: 0
CHANGE IN BOWEL HABIT: 0
NAUSEA: 0
WEAKNESS: 0
SHORTNESS OF BREATH: 0
VOMITING: 0
JOINT SWELLING: 0
NUMBNESS: 0

## 2019-06-23 NOTE — PROGRESS NOTES
Subjective:   Irina Conrad is a 79 y.o. female who presents for Shoulder Pain ((R) x4days )        Shoulder Pain   This is a new problem. Episode onset: 4 days. The problem occurs constantly. The problem has been gradually improving. Pertinent negatives include no abdominal pain, change in bowel habit, chest pain, chills, congestion, coughing, fatigue, fever, joint swelling, nausea, numbness, rash, vomiting or weakness. She has tried NSAIDs for the symptoms. The treatment provided mild relief.     Pain started after carrying something heavy. No previous surgeries or injuries.     F/u with pcp on 7/17/19     Review of Systems   Constitutional: Negative for chills, fatigue, fever and malaise/fatigue.   HENT: Negative for congestion.    Respiratory: Negative for cough and shortness of breath.    Cardiovascular: Negative for chest pain.   Gastrointestinal: Negative for abdominal pain, change in bowel habit, constipation, diarrhea, nausea and vomiting.   Musculoskeletal: Positive for joint pain. Negative for falls and joint swelling.   Skin: Negative for rash.   Neurological: Negative for tingling, weakness and numbness.   All other systems reviewed and are negative.      PMH:  has a past medical history of Bronchiectasis (HCC); Chronic cough; Falls; and Knee pain, right.  MEDS:   Current Outpatient Prescriptions:   •  ibuprofen (MOTRIN) 200 MG Tab, Take 200 mg by mouth every 6 hours as needed., Disp: , Rfl:   •  umeclidinium-vilanterol (ANORO ELLIPTA) 62.5-25 MCG/INH AEROSOL POWDER, BREATH ACTIVATED inhaler, Inhale 1 Puff by mouth every day., Disp: 1 Inhaler, Rfl: 11  ALLERGIES:   Allergies   Allergen Reactions   • Chocolate Shortness of Breath   • Chocolate Flavor Shortness of Breath     SURGHX:   Past Surgical History:   Procedure Laterality Date   • BRONCHOSCOPY-ENDO N/A 11/19/2017    Procedure: BRONCHOSCOPY-ENDO;  Surgeon: Chriss Goldberg M.D.;  Location: ENDOSCOPY [facility];  Service: Ent     SOCHX:   "reports that she quit smoking about 32 years ago. She has never used smokeless tobacco. She reports that she does not drink alcohol or use drugs.  Family History   Problem Relation Age of Onset   • Asthma Father    • No Known Problems Sister         Objective:   /70   Pulse 82   Temp 36.6 °C (97.9 °F)   Resp 12   Ht 1.473 m (4' 10\")   Wt 41.3 kg (91 lb)   SpO2 94%   BMI 19.02 kg/m²     Physical Exam   Constitutional: She is oriented to person, place, and time. She appears well-developed and well-nourished. No distress.   HENT:   Head: Normocephalic and atraumatic.   Nose: Nose normal.   Eyes: Pupils are equal, round, and reactive to light. Conjunctivae are normal.   Neck: Normal range of motion. Neck supple. No tracheal deviation present.   Cardiovascular: Normal rate and regular rhythm.    Pulmonary/Chest: Effort normal and breath sounds normal. No respiratory distress. She has no wheezes.   Musculoskeletal:        Left shoulder: She exhibits tenderness and spasm. She exhibits normal range of motion, no bony tenderness, no swelling, no effusion and no pain.        Arms:  Negative Neer's, Mendoza Jaden, empty can test.  Distal N/V intact.   Neurological: She is alert and oriented to person, place, and time.   Skin: Skin is warm and dry. Capillary refill takes less than 2 seconds.   Psychiatric: She has a normal mood and affect. Her behavior is normal.   Vitals reviewed.        Assessment/Plan:     1. Strain of left shoulder, initial encounter       History and physical completed with [employer] .    Continue ibuprofen 400-600 mg 3 times daily.  Educational handout on neck and shoulder exercises.  Continue massaging area and muscle rubs.    Follow-up with primary care provider as scheduled, consider PT if symptoms persist.  If symptoms worsen or persist patient can return to clinic for reevaluation.  Patient verbalized understanding of information.    Please note that this dictation was created " using voice recognition software. I have made every reasonable attempt to correct obvious errors, but I expect that there are errors of grammar and possibly content that I did not discover before finalizing the note.

## 2019-06-23 NOTE — PATIENT INSTRUCTIONS
Neck Exercises  Neck exercises can be important for many reasons:  · They can help you to improve and maintain flexibility in your neck. This can be especially important as you age.  · They can help to make your neck stronger. This can make movement easier.  · They can reduce or prevent neck pain.  · They may help your upper back.  Ask your health care provider which neck exercises would be best for you.  Exercises  Neck Press   Repeat this exercise 10 times. Do it first thing in the morning and right before bed or as told by your health care provider.  1. Lie on your back on a firm bed or on the floor with a pillow under your head.  2. Use your neck muscles to push your head down on the pillow and straighten your spine.  3. Hold the position as well as you can. Keep your head facing up and your chin tucked.  4. Slowly count to 5 while holding this position.  5. Relax for a few seconds. Then repeat.  Isometric Strengthening   Do a full set of these exercises 2 times a day or as told by your health care provider.  1. Sit in a supportive chair and place your hand on your forehead.  2. Push forward with your head and neck while pushing back with your hand. Hold for 10 seconds.  3. Relax. Then repeat the exercise 3 times.  4. Next, do the sequence again, this time putting your hand against the back of your head. Use your head and neck to push backward against the hand pressure.  5. Finally, do the same exercise on either side of your head, pushing sideways against the pressure of your hand.  Prone Head Lifts   Repeat this exercise 5 times. Do this 2 times a day or as told by your health care provider.  1. Lie face-down, resting on your elbows so that your chest and upper back are raised.  2. Start with your head facing downward, near your chest. Position your chin either on or near your chest.  3. Slowly lift your head upward. Lift until you are looking straight ahead. Then continue lifting your head as far back as you  can stretch.  4. Hold your head up for 5 seconds. Then slowly lower it to your starting position.  Supine Head Lifts   Repeat this exercise 8-10 times. Do this 2 times a day or as told by your health care provider.  1. Lie on your back, bending your knees to point to the ceiling and keeping your feet flat on the floor.  2. Lift your head slowly off the floor, raising your chin toward your chest.  3. Hold for 5 seconds.  4. Relax and repeat.  Scapular Retraction   Repeat this exercise 5 times. Do this 2 times a day or as told by your health care provider.  1. Stand with your arms at your sides. Look straight ahead.  2. Slowly pull both shoulders backward and downward until you feel a stretch between your shoulder blades in your upper back.  3. Hold for 10-30 seconds.  4. Relax and repeat.  Contact a health care provider if:  · Your neck pain or discomfort gets much worse when you do an exercise.  · Your neck pain or discomfort does not improve within 2 hours after you exercise.  If you have any of these problems, stop exercising right away. Do not do the exercises again unless your health care provider says that you can.  Get help right away if:  · You develop sudden, severe neck pain. If this happens, stop exercising right away. Do not do the exercises again unless your health care provider says that you can.  Exercises  Neck Stretch   Repeat this exercise 3-5 times.  1. Do this exercise while standing or while sitting in a chair.  2. Place your feet flat on the floor, shoulder-width apart.  3. Slowly turn your head to the right. Turn it all the way to the right so you can look over your right shoulder. Do not tilt or tip your head.  4. Hold this position for 10-30 seconds.  5. Slowly turn your head to the left, to look over your left shoulder.  6. Hold this position for 10-30 seconds.  Neck Retraction   Repeat this exercise 8-10 times. Do this 3-4 times a day or as told by your health care provider.  1. Do this  exercise while standing or while sitting in a sturdy chair.  2. Look straight ahead. Do not bend your neck.  3. Use your fingers to push your chin backward. Do not bend your neck for this movement. Continue to face straight ahead. If you are doing the exercise properly, you will feel a slight sensation in your throat and a stretch at the back of your neck.  4. Hold the stretch for 1-2 seconds. Relax and repeat.  This information is not intended to replace advice given to you by your health care provider. Make sure you discuss any questions you have with your health care provider.  Document Released: 11/28/2016 Document Revised: 05/25/2017 Document Reviewed: 06/27/2016  [employer] Interactive Patient Education © 2017 [employer]        Shoulder Range of Motion Exercises  Shoulder range of motion (ROM) exercises are designed to keep the shoulder moving freely. They are often recommended for people who have shoulder pain.  Phase 1 exercises  When you are able, do this exercise 5-6 days per week, or as told by your health care provider. Work toward doing 2 sets of 10 swings.  Pendulum Exercise   How To Do This Exercise Lying Down  1. Lie face-down on a bed with your abdomen close to the side of the bed.  2. Let your arm hang over the side of the bed.  3. Relax your shoulder, arm, and hand.  4. Slowly and gently swing your arm forward and back. Do not use your neck muscles to swing your arm. They should be relaxed. If you are struggling to swing your arm, have someone gently swing it for you. When you do this exercise for the first time, swing your arm at a 15 degree angle for 15 seconds, or swing your arm 10 times. As pain lessens over time, increase the angle of the swing to 30-45 degrees.  5. Repeat steps 1-4 with the other arm.  How To Do This Exercise While Standing  6. Stand next to a sturdy chair or table and hold on to it with your hand.  1. Bend forward at the waist.  2. Bend your knees slightly.  3. Relax your  "other arm and let it hang limp.  4. Relax the shoulder blade of the arm that is hanging and let it drop.  5. While keeping your shoulder relaxed, use body motion to swing your arm in small circles. The first time you do this exercise, swing your arm for about 30 seconds or 10 times. When you do it next time, swing your arm for a little longer.  6. Stand up tall and relax.  7. Repeat steps 1-7, this time changing the direction of the circles.  7. Repeat steps 1-8 with the other arm.  Phase 2 exercises  Do these exercises 3-4 times per day on 5-6 days per week or as told by your health care provider. Work toward holding the stretch for 20 seconds.  Stretching Exercise 1  6. Lift your arm straight out in front of you.  7. Bend your arm 90 degrees at the elbow (right angle) so your forearm goes across your body and looks like the letter \"L.\"  8. Use your other arm to gently pull the elbow forward and across your body.  9. Repeat steps 1-3 with the other arm.  Stretching Exercise 2   You will need a towel or rope for this exercise.  5. Bend one arm behind your back with the palm facing outward.  6. Hold a towel with your other hand.  7. Reach the arm that holds the towel above your head, and bend that arm at the elbow. Your wrist should be behind your neck.  8. Use your free hand to grab the free end of the towel.  9. With the higher hand, gently pull the towel up behind you.  10. With the lower hand, pull the towel down behind you.  11. Repeat steps 1-6 with the other arm.  Phase 3 exercises  Do each of these exercises at four different times of day (sessions) every day or as told by your health care provider. To begin with, repeat each exercise 5 times (repetitions). Work toward doing 3 sets of 12 repetitions or as told by your health care provider.  Strengthening Exercise 1   You will need a light weight for this activity. As you grow stronger, you may use a heavier weight.  5. Standing with a weight in your hand, " lift your arm straight out to the side until it is at the same height as your shoulder.  6. Bend your arm at 90 degrees so that your fingers are pointing to the ceiling.  7. Slowly raise your hand until your arm is straight up in the air.  8. Repeat steps 1-3 with the other arm.  Strengthening Exercise 2   You will need a light weight for this activity. As you grow stronger, you may use a heavier weight.  5. Standing with a weight in your hand, gradually move your straight arm in an arc, starting at your side, then out in front of you, then straight up over your head.  6. Gradually move your other arm in an arc, starting at your side, then out in front of you, then straight up over your head.  7. Repeat steps 1-2 with the other arm.  Strengthening Exercise 3   You will need an elastic band for this activity. As you grow stronger, gradually increase the size of the bands or increase the number of bands that you use at one time.  1. While standing, hold an elastic band in one hand and raise that arm up in the air.  2. With your other hand, pull down the band until that hand is by your side.  3. Repeat steps 1-2 with the other arm.  This information is not intended to replace advice given to you by your health care provider. Make sure you discuss any questions you have with your health care provider.  Document Released: 09/15/2004 Document Revised: 08/13/2017 Document Reviewed: 12/14/2015  [employer] Interactive Patient Education © 2017 [employer]

## 2019-06-28 ENCOUNTER — OFFICE VISIT (OUTPATIENT)
Dept: PULMONOLOGY | Facility: HOSPICE | Age: 79
End: 2019-06-28
Payer: MEDICARE

## 2019-06-28 VITALS
OXYGEN SATURATION: 94 % | DIASTOLIC BLOOD PRESSURE: 80 MMHG | BODY MASS INDEX: 19.73 KG/M2 | HEART RATE: 60 BPM | WEIGHT: 94 LBS | RESPIRATION RATE: 14 BRPM | SYSTOLIC BLOOD PRESSURE: 136 MMHG | HEIGHT: 58 IN | TEMPERATURE: 97.4 F

## 2019-06-28 DIAGNOSIS — R06.02 SOB (SHORTNESS OF BREATH): ICD-10-CM

## 2019-06-28 DIAGNOSIS — R05.9 COUGH: ICD-10-CM

## 2019-06-28 DIAGNOSIS — J47.9 BRONCHIECTASIS WITHOUT COMPLICATION (HCC): ICD-10-CM

## 2019-06-28 DIAGNOSIS — A31.0 MAI (MYCOBACTERIUM AVIUM-INTRACELLULARE) INFECTION (HCC): ICD-10-CM

## 2019-06-28 DIAGNOSIS — R93.89 ABNORMAL CHEST CT: ICD-10-CM

## 2019-06-28 PROCEDURE — 99214 OFFICE O/P EST MOD 30 MIN: CPT | Performed by: INTERNAL MEDICINE

## 2019-06-28 ASSESSMENT — PAIN SCALES - GENERAL: PAINLEVEL: NO PAIN

## 2019-06-28 NOTE — PROGRESS NOTES
Chief Complaint   Patient presents with   • Follow-Up     CT and lab results       HPI:  Please see my prior note from 3/27/2019.  The patient is a 79-year-old woman with sputum positivity for atypical mycobacteria.  Her QuantiFERON gold is negative.  Her CT scan shows bronchiectasis with scattered multifocal opacifications.  She also has some cavitary areas.  We started her on therapy with azithromycin, rifampin, and ethambutol in the spring 2018.  We wrote the prescriptions in March.  Apparently she did not start taking the medications until June.  We saw her about a year after that and stressed the need for audiometry and eye exam.  Most recently she ran out of medications in April and she was told that the medications could not be refilled until she saw a physician in our clinic.  Her repeat CT does show some resolution of the right upper lobe infiltrate but some increase in the scattered infiltrates and cavitation.  In late March and early April she had 5 AFB sputum samples performed.  All were smear negative.  However 2 of the samples grew atypical mycobacteria once again.  Repeat liver function testing was normal.  She is not having any significant visual or hearing problems.  She does complain of cough and has chronic shortness of breath.  No complaints of fever, chills, night sweats, or jaundice.    Past Medical History:   Diagnosis Date   • Bronchiectasis (HCC)    • Chronic cough     worse since Sept 2017   • Falls    • Knee pain, right        ROS:   Constitutional: Denies fevers, chills, night sweats, fatigue or weight loss  Eyes: Denies vision loss, pain, drainage, double vision  Ears, Nose, Throat: Denies earache, tinnitus, hoarseness  Cardiovascular: Denies chest pain, tightness, palpitations  Respiratory: See HPI  Sleep: Denies, snoring, apnea  GI: Denies abdominal pain, nausea, vomiting, diarrhea  : Denies frequent urination, hematuria, painful urination  Musculoskeletal: Denies back pain, painful  "joints, sore muscles  Neurological: Denies headaches, seizures  Skin: Denies rashes, color changes  Psychiatric: Denies depression or thoughts of suicide  Hematologic: Denies bleeding tendency or clotting tendency  Allergic/Immunologic: Denies rhinitis, skin sensitivity    Social History     Social History   • Marital status:      Spouse name: N/A   • Number of children: N/A   • Years of education: N/A     Occupational History   • cafe in [facility]    • originally from the [facility]      here since October 2017     Social History Main Topics   • Smoking status: Former Smoker     Quit date: 11/29/1986   • Smokeless tobacco: Never Used      Comment: smoked during first trimester of prenancy   • Alcohol use No   • Drug use: No   • Sexual activity: Not Currently     Other Topics Concern   • Not on file     Social History Narrative   • No narrative on file     Chocolate and Chocolate flavor  Current Outpatient Prescriptions on File Prior to Visit   Medication Sig Dispense Refill   • ibuprofen (MOTRIN) 200 MG Tab Take 200 mg by mouth every 6 hours as needed.     • umeclidinium-vilanterol (ANORO ELLIPTA) 62.5-25 MCG/INH AEROSOL POWDER, BREATH ACTIVATED inhaler Inhale 1 Puff by mouth every day. 1 Inhaler 11     No current facility-administered medications on file prior to visit.      /80   Pulse 60   Temp 36.3 °C (97.4 °F) (Oral)   Resp 14   Ht 1.473 m (4' 10\")   Wt 42.6 kg (94 lb)   SpO2 94%   Family History   Problem Relation Age of Onset   • Asthma Father    • No Known Problems Sister        Physical Exam:  Elderly, no acute distress at rest  HEENT: PERRLA, EOMI, no scleral icterus, no nasal or oral lesions  Neck: No thyromegaly, no adenopathy, no bruits  Mallampatti: Grade II  Lungs: Diffuse crackles but no wheezes  Heart: Regular rate and rhythm, no gallops or murmurs  Abdomen: Soft, benign, no organomegaly  Extremities: No clubbing, cyanosis, or edema  Neurologic: Cranial nerve, motor, and sensory " exam are normal    1. Bronchiectasis without complication (HCC)    2. Cough    3. KAVITHA (mycobacterium avium-intracellulare) infection (HCC)    4. SOB (shortness of breath)    5. Abnormal chest CT      We will follow her every 3 months.  So far she has tolerated 3 drug therapy very well.  Her sputum are still positive however and she has had some areas of improvement in some areas of worsening on her CT scan.  We will continue with azithromycin 250 mg, rifampin 300 mg, and ethambutol 800 mg (2 x 400 mg) 3 times weekly.  We have asked her to spread out the medications and not take them at the same time.  We have also cautioned the daughter to pay attention to her mother's hearing and vision.  She will also continue on Anoro.  She will see us sooner if she has any problems.  I have asked our office staff to please refill her medications if she happens to run out.

## 2019-09-18 ENCOUNTER — APPOINTMENT (OUTPATIENT)
Dept: PULMONOLOGY | Facility: HOSPICE | Age: 79
End: 2019-09-18
Payer: MEDICARE

## 2019-12-11 ENCOUNTER — APPOINTMENT (OUTPATIENT)
Dept: PULMONOLOGY | Facility: HOSPICE | Age: 79
End: 2019-12-11
Payer: MEDICARE

## 2020-01-08 ENCOUNTER — OFFICE VISIT (OUTPATIENT)
Dept: MEDICAL GROUP | Facility: PHYSICIAN GROUP | Age: 80
End: 2020-01-08
Payer: MEDICARE

## 2020-01-08 VITALS
WEIGHT: 91.71 LBS | HEIGHT: 58 IN | OXYGEN SATURATION: 98 % | SYSTOLIC BLOOD PRESSURE: 128 MMHG | TEMPERATURE: 97.5 F | DIASTOLIC BLOOD PRESSURE: 60 MMHG | HEART RATE: 78 BPM | BODY MASS INDEX: 19.25 KG/M2

## 2020-01-08 DIAGNOSIS — Z12.39 SCREENING FOR BREAST CANCER: ICD-10-CM

## 2020-01-08 DIAGNOSIS — Z78.0 POSTMENOPAUSAL: ICD-10-CM

## 2020-01-08 DIAGNOSIS — A31.0 MAI (MYCOBACTERIUM AVIUM-INTRACELLULARE) INFECTION (HCC): ICD-10-CM

## 2020-01-08 DIAGNOSIS — J47.9 BRONCHIECTASIS WITHOUT COMPLICATION (HCC): ICD-10-CM

## 2020-01-08 DIAGNOSIS — E78.5 DYSLIPIDEMIA: ICD-10-CM

## 2020-01-08 PROCEDURE — 99214 OFFICE O/P EST MOD 30 MIN: CPT | Performed by: FAMILY MEDICINE

## 2020-01-08 RX ORDER — AZITHROMYCIN 250 MG/1
250 TABLET, FILM COATED ORAL
Qty: 40 TAB | Refills: 5 | Status: SHIPPED | OUTPATIENT
Start: 2020-01-08 | End: 2021-03-16 | Stop reason: SDUPTHER

## 2020-01-08 RX ORDER — ETHAMBUTOL HYDROCHLORIDE 400 MG/1
TABLET, FILM COATED ORAL
Qty: 80 TAB | Refills: 5 | Status: SHIPPED | OUTPATIENT
Start: 2020-01-08 | End: 2020-05-18 | Stop reason: SDUPTHER

## 2020-01-08 RX ORDER — RIFAMPIN 300 MG/1
CAPSULE ORAL
Qty: 40 CAP | Refills: 5 | Status: SHIPPED | OUTPATIENT
Start: 2020-01-08 | End: 2021-02-01 | Stop reason: SDUPTHER

## 2020-01-08 ASSESSMENT — PATIENT HEALTH QUESTIONNAIRE - PHQ9: CLINICAL INTERPRETATION OF PHQ2 SCORE: 0

## 2020-01-08 NOTE — PROGRESS NOTES
Subjective:      Irina Conrad is a 79 y.o. female who presents with Follow-Up        HPI:    The patient is here for follow up on her chronic medical problems and chronic cough.  She is accompanied by her daughter.     KAVITHA (mycobacterium avium-intracellulare) infection (HCC)  Bronchiectasis without complication  According to the daughter, patient moved to [place] in August.  She establish with PCP in [place] but they did not refill her medications for KAVITHA infection which were Azithromycin, Ethambutol, and Rifampin . She has not taken these medications for the past 5 months.  She also ran out of her inhaler which was an Anoro and has not taken this in the last 5 months as well..  The last time she was seen by her pulmonary specialist Dr. Collazo was in June 2019 and was instructed to return in September but she was unable to keep the appointment and she was in [place] at that time. Since ceasing her prescribed medications she reports no worsening to her symptoms. Today, she continues to have a mild cough with grey sputum production which is the same as her baseline.  She affirms chronic occasional shortness of breath. Denies any hemoptysis.  Denies any fever, chills, weight loss.  She moved back to the area to live with her daughter again.  She needs refills on her medications while waiting to be seen by her pulmonary specialist.  She got her flu shot on 10/4/2019.  She is still up-to-date with her Prevnar 13 and Pneumovax 23.    Dyslipidemia  She continues to manage her dyslipidemia through her own efforts.  Her last lipid panel was in January 2019 that came back LDL was at borderline elevation of 100 and the rest at target.     Past medical history, past surgical history, family history reviewed-no changes    Social history reviewed-no changes    Allergies reviewed-no changes    Medications reviewed-no changes     ROS:  As per the HPI as shown above, the rest are negative.       Objective:     /60 (BP  "Location: Left arm, Patient Position: Sitting, BP Cuff Size: Adult)   Pulse 78   Temp 36.4 °C (97.5 °F) (Temporal)   Ht 1.473 m (4' 10\")   Wt 41.6 kg (91 lb 11.4 oz)   SpO2 98%   BMI 19.17 kg/m²     Physical Exam  Examined alert, awake, oriented, not in distress    Neck-supple, no lymphadenopathy, no thyromegaly  Lungs-Rales in both lung fields which is an old finding, no wheezing, good air exchange  Heart-regular rate and rhythm, no murmur  Extremities-no edema, clubbing, cyanosis       Labs:  Hospital Outpatient Visit on 06/17/2019   Component Date Value Ref Range Status   • Sodium 06/17/2019 137  135 - 145 mmol/L Final   • Potassium 06/17/2019 4.3  3.6 - 5.5 mmol/L Final   • Chloride 06/17/2019 99  96 - 112 mmol/L Final   • Co2 06/17/2019 28  20 - 33 mmol/L Final   • Anion Gap 06/17/2019 10.0  0.0 - 11.9 Final   • Glucose 06/17/2019 94  65 - 99 mg/dL Final   • Bun 06/17/2019 20  8 - 22 mg/dL Final   • Creatinine 06/17/2019 0.92  0.50 - 1.40 mg/dL Final   • Calcium 06/17/2019 9.3  8.5 - 10.5 mg/dL Final   • AST(SGOT) 06/17/2019 23  12 - 45 U/L Final   • ALT(SGPT) 06/17/2019 15  2 - 50 U/L Final   • Alkaline Phosphatase 06/17/2019 80  30 - 99 U/L Final   • Total Bilirubin 06/17/2019 0.4  0.1 - 1.5 mg/dL Final   • Albumin 06/17/2019 3.9  3.2 - 4.9 g/dL Final   • Total Protein 06/17/2019 8.0  6.0 - 8.2 g/dL Final   • Globulin 06/17/2019 4.1* 1.9 - 3.5 g/dL Final   • A-G Ratio 06/17/2019 1.0  g/dL Final   • Fasting Status 06/17/2019 Non-Fasting   Final   • GFR If  06/17/2019 >60  >60 mL/min/1.73 m 2 Final   • GFR If Non  06/17/2019 59* >60 mL/min/1.73 m 2 Final          Assessment/Plan:     1. Bronchiectasis without complication (HCC)  She has not been taking her Azithromycin, ethambutol, and Rifadine for the past five months. Oxygen saturation on room air is normal. Advised her daughter to contact Dr. Collazo's office to schedule an appointment. Refills of medications have been " ordered.   - umeclidinium-vilanterol (ANORO ELLIPTA) 62.5-25 MCG/INH AEROSOL POWDER, BREATH ACTIVATED inhaler; Inhale 1 Puff by mouth every day.  Dispense: 1 Inhaler; Refill: 11  - Lipid Profile; Future  - Comp Metabolic Panel; Future  - CBC WITH DIFFERENTIAL; Future    2. KAVITHA (mycobacterium avium-intracellulare) infection (HCC)  See #1.  - ethambutol (MYAMBUTOL) 400 MG Tab; TAKE 2 TABLETS BY MOUTH 3 TIMES A WEEK ON TUESDAY, THURSDAY AND SATURDAY  Dispense: 80 Tab; Refill: 5  - riFAMPin (RIFADINE) 300 MG Cap; Take 1 cap by mouth 3 times a week (Monday, Wednesday and Friday)  Dispense: 40 Cap; Refill: 5  - azithromycin (ZITHROMAX) 250 MG Tab; Take 1 Tab by mouth every Monday, Wednesday, and Friday.  Dispense: 40 Tab; Refill: 5  - Lipid Profile; Future  - Comp Metabolic Panel; Future  - CBC WITH DIFFERENTIAL; Future    3. Dyslipidemia  Her last lipid panel values were slightly elevated. She will continue to manage this through her own efforts. I have advised the patient to increase her exercise regimen and avoid fatty foods. Labs have been ordered for the next follow up visit.   - Lipid Profile; Future  - Comp Metabolic Panel; Future  - CBC WITH DIFFERENTIAL; Future    4. Screening for breast cancer  Patient elects to continue mammogram screenings. She will schedule and appoitnment  - MA-SCREEN MAMMO W/CAD-BILAT; Future    5. Postmenopausal  We discussed screening bone density scan for osteoporosis and she agrees to proceed with this.  - DS-BONE DENSITY STUDY (DEXA); Future      Crys QUISPE (Scribe), am scribing for, and in the presence of, Poppy Freeman MD     Electronically signed by: Crys Saldivar (Scribe), 1/8/2020    Poppy QUISPE MD personally performed the services described in this documentation, as scribed by Crys Saldivar in my presence, and it is both accurate and complete.

## 2020-01-22 ENCOUNTER — OFFICE VISIT (OUTPATIENT)
Dept: PULMONOLOGY | Facility: HOSPICE | Age: 80
End: 2020-01-22
Payer: MEDICARE

## 2020-01-22 VITALS
WEIGHT: 90 LBS | HEART RATE: 72 BPM | SYSTOLIC BLOOD PRESSURE: 138 MMHG | OXYGEN SATURATION: 92 % | RESPIRATION RATE: 14 BRPM | TEMPERATURE: 99 F | HEIGHT: 58 IN | DIASTOLIC BLOOD PRESSURE: 76 MMHG | BODY MASS INDEX: 18.89 KG/M2

## 2020-01-22 DIAGNOSIS — A31.0 MAI (MYCOBACTERIUM AVIUM-INTRACELLULARE) INFECTION (HCC): ICD-10-CM

## 2020-01-22 DIAGNOSIS — J47.9 BRONCHIECTASIS WITHOUT COMPLICATION (HCC): ICD-10-CM

## 2020-01-22 DIAGNOSIS — R93.89 ABNORMAL CHEST CT: ICD-10-CM

## 2020-01-22 PROCEDURE — 99214 OFFICE O/P EST MOD 30 MIN: CPT | Performed by: INTERNAL MEDICINE

## 2020-01-22 ASSESSMENT — PAIN SCALES - GENERAL: PAINLEVEL: NO PAIN

## 2020-01-23 NOTE — PROGRESS NOTES
Chief Complaint   Patient presents with   • Follow-Up     Cough       HPI:  Please see my prior note from 3/27/2019.  She is accompanied by her daughter. The patient is a 79-year-old woman with sputum positivity for atypical mycobacteria.  Her QuantiFERON gold is negative.  Her CT scan shows bronchiectasis with scattered multifocal opacifications.  She also has some cavitary areas.  We started her on therapy with azithromycin, rifampin, and ethambutol in the spring 2018.  We wrote the prescriptions in March.  Apparently she did not start taking the medications until June.  We saw her about a year after that and stressed the need for audiometry and eye exam.  Most recently she ran out of medications in April and she was told that the medications could not be refilled until she saw a physician in our clinic.  Her repeat CT does show some resolution of the right upper lobe infiltrate but some increase in the scattered infiltrates and cavitation.  In late March and early April she had 5 AFB sputum samples performed.  All were smear negative.  However 2 of the samples grew atypical mycobacteria once again.  Repeat liver function testing was normal.  She is not having any significant visual or hearing problems.  She does complain of cough and has chronic shortness of breath.  No complaints of fever, chills, night sweats, or jaundice.  Since we saw her in June 2019 she spent time in [place].  She did not have any medication with her when she went to [place] and stopped taking her medications until she was recently seen by Dr. Freeman and they were restarted.  She is still not having any auditory or visual problems.  However, she is difficult to take care of because of her tendency to discontinue her medications and the large gaps between treatment.  Clinically she is not worse and actually says she is better.    Past Medical History:   Diagnosis Date   • Bronchiectasis (HCC)    • Chronic cough     worse since Sept 2017    • Falls    • Knee pain, right        ROS:   Constitutional: Denies fevers, chills, night sweats, fatigue or weight loss  Eyes: Denies vision loss, pain, drainage, double vision  Ears, Nose, Throat: Denies earache, tinnitus, hoarseness  Cardiovascular: Denies chest pain, tightness, palpitations  Respiratory: Positive for chronic shortness of breath, sputum production, cough  Sleep: Denies, snoring, apnea  GI: Denies abdominal pain, nausea, vomiting, diarrhea  : Denies frequent urination, hematuria, painful urination  Musculoskeletal: Denies back pain, painful joints, sore muscles  Neurological: Denies headaches, seizures  Skin: Denies rashes, color changes  Psychiatric: Denies depression or thoughts of suicide  Hematologic: Denies bleeding tendency or clotting tendency  Allergic/Immunologic: Denies rhinitis, skin sensitivity    Social History     Socioeconomic History   • Marital status:      Spouse name: Not on file   • Number of children: Not on file   • Years of education: Not on file   • Highest education level: Not on file   Occupational History   • Occupation: cafe in [employer]   • Occupation: originally from the [employer]     Comment: here since 2017   Social Needs   • Financial resource strain: Not on file   • Food insecurity:     Worry: Not on file     Inability: Not on file   • Transportation needs:     Medical: Not on file     Non-medical: Not on file   Tobacco Use   • Smoking status: Former Smoker     Packs/day: 0.00     Last attempt to quit: 1986     Years since quittin.1   • Smokeless tobacco: Never Used   • Tobacco comment: smoked during first trimester of prenancy   Substance and Sexual Activity   • Alcohol use: No   • Drug use: No   • Sexual activity: Not Currently   Lifestyle   • Physical activity:     Days per week: Not on file     Minutes per session: Not on file   • Stress: Not on file   Relationships   • Social connections:     Talks on phone: Not on file     Gets  "together: Not on file     Attends Shinto service: Not on file     Active member of club or organization: Not on file     Attends meetings of clubs or organizations: Not on file     Relationship status: Not on file   • Intimate partner violence:     Fear of current or ex partner: Not on file     Emotionally abused: Not on file     Physically abused: Not on file     Forced sexual activity: Not on file   Other Topics Concern   • Not on file   Social History Narrative   • Not on file     Chocolate and Chocolate flavor  Current Outpatient Medications on File Prior to Visit   Medication Sig Dispense Refill   • ethambutol (MYAMBUTOL) 400 MG Tab TAKE 2 TABLETS BY MOUTH 3 TIMES A WEEK ON TUESDAY, THURSDAY AND SATURDAY 80 Tab 5   • riFAMPin (RIFADINE) 300 MG Cap Take 1 cap by mouth 3 times a week (Monday, Wednesday and Friday) 40 Cap 5   • azithromycin (ZITHROMAX) 250 MG Tab Take 1 Tab by mouth every Monday, Wednesday, and Friday. 40 Tab 5   • umeclidinium-vilanterol (ANORO ELLIPTA) 62.5-25 MCG/INH AEROSOL POWDER, BREATH ACTIVATED inhaler Inhale 1 Puff by mouth every day. 1 Inhaler 11   • ibuprofen (MOTRIN) 200 MG Tab Take 200 mg by mouth every 6 hours as needed.       No current facility-administered medications on file prior to visit.      /76 (BP Location: Left arm, Patient Position: Sitting, BP Cuff Size: Small adult)   Pulse 72   Temp 37.2 °C (99 °F) (Oral)   Resp 14   Ht 1.473 m (4' 10\")   Wt 40.8 kg (90 lb)   SpO2 92%   Family History   Problem Relation Age of Onset   • Asthma Father    • No Known Problems Sister        Physical Exam:  Elderly, tiny.  No acute distress on room air.  She did not cough during our interview.  HEENT: PERRLA, EOMI, no scleral icterus, no nasal or oral lesions  Neck: No thyromegaly, no adenopathy, no bruits  Mallampatti: Grade II  Lungs: Diffuse coarse crackles  Heart: Regular rate and rhythm, no gallops or murmurs  Abdomen: Soft, benign, no organomegaly  Extremities: No " clubbing, cyanosis, or edema  Neurologic: Cranial nerve, motor, and sensory exam are normal    1. Bronchiectasis without complication (HCC)    2. KAVITHA (mycobacterium avium-intracellulare) infection (HCC)    3. Abnormal chest CT      We will follow her every 3 months.  So far she has tolerated 3 drug therapy very well.  Her sputum are still positive however and she has had some areas of improvement in some areas of worsening on her CT scan.  We will continue with azithromycin 250 mg, rifampin 300 mg, and ethambutol 800 mg (2 x 400 mg) 3 times weekly.  We have asked her to spread out the medications and not take them at the same time.  We have also cautioned the daughter to pay attention to her mother's hearing and vision.  She will also continue on Anoro.  She will see us sooner if she has any problems.  I have asked our office staff to please refill her medications if she happens to run out.  I have also asked her daughter to make sure that she has medications to take with her if she plans to spend time in [place].  If she continues to be a medication compliance problem we will probably discontinue her medications.  We have ordered 3 sputum for AFB/KAVITHA.

## 2020-01-24 ENCOUNTER — HOSPITAL ENCOUNTER (OUTPATIENT)
Facility: MEDICAL CENTER | Age: 80
End: 2020-01-24
Attending: INTERNAL MEDICINE
Payer: MEDICARE

## 2020-01-24 DIAGNOSIS — J47.9 BRONCHIECTASIS WITHOUT COMPLICATION (HCC): ICD-10-CM

## 2020-01-24 DIAGNOSIS — A31.0 MAI (MYCOBACTERIUM AVIUM-INTRACELLULARE) INFECTION (HCC): ICD-10-CM

## 2020-01-24 PROCEDURE — 87206 SMEAR FLUORESCENT/ACID STAI: CPT

## 2020-01-24 PROCEDURE — 87015 SPECIMEN INFECT AGNT CONCNTJ: CPT

## 2020-01-24 PROCEDURE — 87116 MYCOBACTERIA CULTURE: CPT

## 2020-01-27 ENCOUNTER — HOSPITAL ENCOUNTER (OUTPATIENT)
Facility: MEDICAL CENTER | Age: 80
End: 2020-01-27
Attending: INTERNAL MEDICINE
Payer: MEDICARE

## 2020-01-27 DIAGNOSIS — J47.9 BRONCHIECTASIS WITHOUT COMPLICATION (HCC): ICD-10-CM

## 2020-01-27 DIAGNOSIS — A31.0 MAI (MYCOBACTERIUM AVIUM-INTRACELLULARE) INFECTION (HCC): ICD-10-CM

## 2020-01-27 PROCEDURE — 87116 MYCOBACTERIA CULTURE: CPT

## 2020-01-27 PROCEDURE — 87206 SMEAR FLUORESCENT/ACID STAI: CPT

## 2020-01-27 PROCEDURE — 87015 SPECIMEN INFECT AGNT CONCNTJ: CPT

## 2020-01-28 ENCOUNTER — HOSPITAL ENCOUNTER (OUTPATIENT)
Facility: MEDICAL CENTER | Age: 80
End: 2020-01-28
Attending: INTERNAL MEDICINE
Payer: MEDICARE

## 2020-01-28 DIAGNOSIS — A31.0 MAI (MYCOBACTERIUM AVIUM-INTRACELLULARE) INFECTION (HCC): ICD-10-CM

## 2020-01-28 DIAGNOSIS — J47.9 BRONCHIECTASIS WITHOUT COMPLICATION (HCC): ICD-10-CM

## 2020-01-28 PROCEDURE — 87015 SPECIMEN INFECT AGNT CONCNTJ: CPT

## 2020-01-28 PROCEDURE — 87116 MYCOBACTERIA CULTURE: CPT

## 2020-01-28 PROCEDURE — 87206 SMEAR FLUORESCENT/ACID STAI: CPT

## 2020-02-04 ENCOUNTER — OFFICE VISIT (OUTPATIENT)
Dept: URGENT CARE | Facility: PHYSICIAN GROUP | Age: 80
End: 2020-02-04
Payer: MEDICARE

## 2020-02-04 ENCOUNTER — HOSPITAL ENCOUNTER (OUTPATIENT)
Dept: RADIOLOGY | Facility: MEDICAL CENTER | Age: 80
End: 2020-02-04
Attending: PHYSICIAN ASSISTANT
Payer: MEDICARE

## 2020-02-04 VITALS
DIASTOLIC BLOOD PRESSURE: 62 MMHG | TEMPERATURE: 98.6 F | WEIGHT: 93.4 LBS | BODY MASS INDEX: 19.61 KG/M2 | OXYGEN SATURATION: 92 % | SYSTOLIC BLOOD PRESSURE: 110 MMHG | HEIGHT: 58 IN | HEART RATE: 73 BPM | RESPIRATION RATE: 20 BRPM

## 2020-02-04 DIAGNOSIS — R07.89 LEFT-SIDED CHEST WALL PAIN: ICD-10-CM

## 2020-02-04 DIAGNOSIS — A31.0 MAI (MYCOBACTERIUM AVIUM-INTRACELLULARE) INFECTION (HCC): ICD-10-CM

## 2020-02-04 DIAGNOSIS — J47.9 BRONCHIECTASIS WITHOUT COMPLICATION (HCC): ICD-10-CM

## 2020-02-04 LAB
APPEARANCE UR: CLEAR
BILIRUB UR STRIP-MCNC: NEGATIVE MG/DL
COLOR UR AUTO: YELLOW
GLUCOSE UR STRIP.AUTO-MCNC: NEGATIVE MG/DL
KETONES UR STRIP.AUTO-MCNC: NEGATIVE MG/DL
LEUKOCYTE ESTERASE UR QL STRIP.AUTO: NEGATIVE
NITRITE UR QL STRIP.AUTO: NEGATIVE
PH UR STRIP.AUTO: 6 [PH] (ref 5–8)
PROT UR QL STRIP: NEGATIVE MG/DL
RBC UR QL AUTO: NEGATIVE
SP GR UR STRIP.AUTO: 1.01
UROBILINOGEN UR STRIP-MCNC: NEGATIVE MG/DL

## 2020-02-04 PROCEDURE — 99214 OFFICE O/P EST MOD 30 MIN: CPT | Performed by: PHYSICIAN ASSISTANT

## 2020-02-04 PROCEDURE — 71046 X-RAY EXAM CHEST 2 VIEWS: CPT

## 2020-02-04 PROCEDURE — 81002 URINALYSIS NONAUTO W/O SCOPE: CPT | Performed by: PHYSICIAN ASSISTANT

## 2020-02-04 ASSESSMENT — ENCOUNTER SYMPTOMS
NAUSEA: 0
SHORTNESS OF BREATH: 0
FEVER: 0
ABDOMINAL PAIN: 0
VOMITING: 0
COUGH: 0

## 2020-02-04 ASSESSMENT — PAIN SCALES - GENERAL: PAINLEVEL: 7=MODERATE-SEVERE PAIN

## 2020-02-04 NOTE — PROGRESS NOTES
"Subjective:   Irina Conrad  is a 79 y.o. female who presents for Abdominal Pain (upper left by rib area, x3 days)    This is a new problem.  Patient presents to urgent care with 3-day history of pain in the left anterior lower ribs/left upper quadrant region.  Patient's daughter provides translation.  I did offer official language line  which they declined.  History is very difficult to ascertain as to the actual location of the patient's pain.  Upon direct questioning, the patient denies any dysuria, urgency, frequency with urination.  Denies any nausea, vomiting, diarrhea.  Denies any recent cough, fall or injury to the area.  She has had no rash.    Of note, patient is currently under the care of pulmonology for Mycobacterium avium infection with bronchiectasis with cavitary lesions.  She was last seen in January 2020 with recommended follow-up in 3 months time.      Abdominal Pain   Pertinent negatives include no dysuria, fever, frequency, hematuria, nausea or vomiting.     Review of Systems   Constitutional: Negative for fever.   Respiratory: Negative for cough and shortness of breath.    Cardiovascular:        Pain left lateral chest wall   Gastrointestinal: Negative for abdominal pain, nausea and vomiting.   Genitourinary: Negative for dysuria, frequency, hematuria and urgency.   All other systems reviewed and are negative.    Allergies   Allergen Reactions   • Chocolate Shortness of Breath   • Chocolate Flavor Shortness of Breath     Reviewed past medical, surgical , social and family history.  Reviewed prescription and over-the-counter medications with patient and electronic health record today.     Objective:   /62 (BP Location: Left arm, Patient Position: Sitting, BP Cuff Size: Adult)   Pulse 73   Temp 37 °C (98.6 °F) (Temporal)   Resp 20   Ht 1.473 m (4' 10\")   Wt 42.4 kg (93 lb 6.4 oz)   SpO2 92%   BMI 19.52 kg/m²   Physical Exam  Vitals signs reviewed.   Constitutional:       " General: She is not in acute distress.     Appearance: She is well-developed. She is not ill-appearing or toxic-appearing.   HENT:      Head: Normocephalic and atraumatic.      Right Ear: Tympanic membrane, ear canal and external ear normal.      Left Ear: Tympanic membrane, ear canal and external ear normal.      Nose: Nose normal.      Mouth/Throat:      Lips: Pink. No lesions.      Mouth: Mucous membranes are moist.      Pharynx: Oropharynx is clear. Uvula midline. No oropharyngeal exudate.   Eyes:      General: Lids are normal.      Extraocular Movements: Extraocular movements intact.      Conjunctiva/sclera: Conjunctivae normal.      Pupils: Pupils are equal, round, and reactive to light.   Neck:      Musculoskeletal: Normal range of motion and neck supple.   Cardiovascular:      Rate and Rhythm: Normal rate and regular rhythm.      Heart sounds: Normal heart sounds. No murmur. No friction rub. No gallop.    Pulmonary:      Effort: Pulmonary effort is normal. No respiratory distress.      Comments: Diffuse coarse breath sounds with crackles throughout  Chest:       Abdominal:      General: Bowel sounds are normal. There is no distension.      Palpations: Abdomen is soft. There is no mass.      Tenderness: There is no tenderness. There is no guarding or rebound.   Musculoskeletal: Normal range of motion.         General: No tenderness or deformity.   Lymphadenopathy:      Head:      Right side of head: No submental, submandibular or tonsillar adenopathy.      Left side of head: No submental, submandibular or tonsillar adenopathy.      Cervical: No cervical adenopathy.      Upper Body:      Right upper body: No supraclavicular adenopathy.      Left upper body: No supraclavicular adenopathy.   Skin:     General: Skin is warm and dry.      Findings: No rash.   Neurological:      Mental Status: She is alert and oriented to person, place, and time.      Cranial Nerves: Cranial nerves are intact. No cranial nerve  deficit.      Sensory: Sensation is intact. No sensory deficit.      Motor: Motor function is intact.      Coordination: Coordination is intact. Coordination normal.      Gait: Gait is intact.   Psychiatric:         Attention and Perception: Attention normal.         Mood and Affect: Mood and affect normal.         Speech: Speech normal.         Behavior: Behavior normal. Behavior is cooperative.         Thought Content: Thought content normal.         Judgment: Judgment normal.           Assessment/Plan:     1. Left-sided chest wall pain  - POCT Urinalysis  - EKG - Clinic Performed  - DX-CHEST-2 VIEWS; Future    2. KAVITHA (mycobacterium avium-intracellulare) infection ([facility])  - DX-CHEST-2 VIEWS; Future    3. Bronchiectasis without complication ([facility])  - DX-CHEST-2 VIEWS; Future    Results for orders placed or performed in visit on 02/04/20   POCT Urinalysis   Result Value Ref Range    POC Color Yellow Negative    POC Appearance Clear Negative    POC Leukocyte Esterase Negative Negative    POC Nitrites Negative Negative    POC Urobiligen Negative Negative (0.2) mg/dL    POC Protein Negative Negative mg/dL    POC Urine PH 6.0 5.0 - 8.0    POC Blood Negative Negative    POC Specific Gravity 1.015 <1.005 - >1.030    POC Ketones Negative Negative mg/dL    POC Bilirubin Negative Negative mg/dL    POC Glucose Negative Negative mg/dL         EKG:  Comparison: 11/22/17, no change compared with previous  Rhythm: Sinus rhythm  Ectopy: None  Rate: 73  QRS Axis: Normal  Conduction: Normal  ST segment: No ST segment elevation or depression noted  T waves: No inverted T waves noted  Other:  Clinical impression: Normal EKG, unchanged from previous    CXR obtained, read by radiologist and reviewed by myself with findings as follows:       IMPRESSION:     1.  Increased bilateral interstitial markings likely related to chronic interstitial disease with bronchiectasis. Superimposed infection cannot be excluded.     2.  Spiculated nodule in  the right lung apex may be related to scarring. CT of the chest is recommended for further evaluation.       I do not see a clear explanation for the patient's pain.  Her urine is without blood, she has no reproducible tenderness, she has no skin rash noted, EKG is within normal limits and unchanged from previous and chest x-ray is noted as above.  I have reviewed with the patient and her daughter that if she has any worsening of pain I would recommend presentation to the emergency department for further evaluation and management.  Otherwise, observe closely for skin rash as this could potentially be early shingles that has not fully declared itself.    In regards to the abnormal finding on x-ray, recommend follow-up with pulmonology.  I will include pulmonology on today's note for their review.    Patient's daughter will do is contacted by telephone at the number listed and provided with the official reading on the x-ray.  Recommend follow-up with pulmonary.    Differential diagnosis, natural history, supportive care, and indications for immediate follow-up discussed.     Red flag warning symptoms and strict ER/follow-up precautions given.  The patient demonstrated a good understanding and agreed with the treatment plan.  Please note that this note was created using voice recognition speech to text software. Every effort has been made to correct obvious errors.  However, I expect there are errors of grammar and possibly context that were not discovered prior to finalizing the note  PAM Wetzel PA-C

## 2020-04-30 ENCOUNTER — OFFICE VISIT (OUTPATIENT)
Dept: PULMONOLOGY | Facility: HOSPICE | Age: 80
End: 2020-04-30
Payer: MEDICARE

## 2020-04-30 VITALS
TEMPERATURE: 97.9 F | OXYGEN SATURATION: 95 % | BODY MASS INDEX: 17.48 KG/M2 | DIASTOLIC BLOOD PRESSURE: 72 MMHG | HEIGHT: 62 IN | RESPIRATION RATE: 14 BRPM | SYSTOLIC BLOOD PRESSURE: 122 MMHG | HEART RATE: 70 BPM | WEIGHT: 95 LBS

## 2020-04-30 DIAGNOSIS — J47.9 BRONCHIECTASIS WITHOUT COMPLICATION (HCC): ICD-10-CM

## 2020-04-30 DIAGNOSIS — R05.9 COUGH: ICD-10-CM

## 2020-04-30 DIAGNOSIS — R93.89 ABNORMAL CHEST CT: ICD-10-CM

## 2020-04-30 DIAGNOSIS — R06.02 SOB (SHORTNESS OF BREATH): ICD-10-CM

## 2020-04-30 DIAGNOSIS — A31.0 MAI (MYCOBACTERIUM AVIUM-INTRACELLULARE) INFECTION (HCC): ICD-10-CM

## 2020-04-30 PROCEDURE — 99214 OFFICE O/P EST MOD 30 MIN: CPT | Performed by: INTERNAL MEDICINE

## 2020-04-30 ASSESSMENT — FIBROSIS 4 INDEX: FIB4 SCORE: 0.97

## 2020-04-30 ASSESSMENT — PAIN SCALES - GENERAL: PAINLEVEL: NO PAIN

## 2020-04-30 NOTE — PROGRESS NOTES
Chief Complaint   Patient presents with   • Follow-Up     Cough       HPI:  Please see my prior notes.  The patient is a 79-year-old woman who was born in the [facility].  She again presents with her daughter today.  She has a CT scan showing diffuse scarring and bronchiectasis with cavitation.  Her sputum has grown KAVITHA on multiple occasions.  We have started her on therapy with azithromycin 250 mg, rifampin 300 mg, and ethambutol 800 mg Monday Wednesday and Friday.  She has taken the medication since that time but with some significant gaps in treatment.  Her most recent sputa in January 2020 were smear and culture negative.  Her liver function studies have remained normal.  She is not having any visual or hearing problems.  She is not having any GI problems tolerating the medication.  Since starting the medication her cough has improved.  Several months ago she had some left-sided chest wall pain.  This has resolved.      Past Medical History:   Diagnosis Date   • Bronchiectasis (HCC)    • Chronic cough     worse since Sept 2017   • Falls    • Knee pain, right        ROS:   Constitutional: Denies fevers, chills, night sweats, fatigue or weight loss  Eyes: Denies vision loss, pain, drainage, double vision  Ears, Nose, Throat: Denies earache, tinnitus, hoarseness  Cardiovascular: Denies chest pain, tightness, palpitations  Respiratory: Chronically short of breath with activity.  Cough improved.  Sleep: Denies, snoring, apnea  GI: Denies abdominal pain, nausea, vomiting, diarrhea  : Denies frequent urination, hematuria, painful urination  Musculoskeletal: Denies back pain, painful joints, sore muscles  Neurological: Denies headaches, seizures  Skin: Denies rashes, color changes  Psychiatric: Denies depression or thoughts of suicide  Hematologic: Denies bleeding tendency or clotting tendency  Allergic/Immunologic: Denies rhinitis, skin sensitivity    Social History     Socioeconomic History   • Marital status:       Spouse name: Not on file   • Number of children: Not on file   • Years of education: Not on file   • Highest education level: Not on file   Occupational History   • Occupation: cafe in [employer]   • Occupation: originally from the [employer] [facility]     Comment: here since 2017   Social Needs   • Financial resource strain: Not on file   • Food insecurity     Worry: Not on file     Inability: Not on file   • Transportation needs     Medical: Not on file     Non-medical: Not on file   Tobacco Use   • Smoking status: Former Smoker     Packs/day: 0.00     Last attempt to quit: 1986     Years since quittin.4   • Smokeless tobacco: Never Used   • Tobacco comment: smoked during first trimester of prenancy   Substance and Sexual Activity   • Alcohol use: No   • Drug use: No   • Sexual activity: Not Currently   Lifestyle   • Physical activity     Days per week: Not on file     Minutes per session: Not on file   • Stress: Not on file   Relationships   • Social connections     Talks on phone: Not on file     Gets together: Not on file     Attends Islam service: Not on file     Active member of club or organization: Not on file     Attends meetings of clubs or organizations: Not on file     Relationship status: Not on file   • Intimate partner violence     Fear of current or ex partner: Not on file     Emotionally abused: Not on file     Physically abused: Not on file     Forced sexual activity: Not on file   Other Topics Concern   • Not on file   Social History Narrative   • Not on file     Chocolate and Chocolate flavor  Current Outpatient Medications on File Prior to Visit   Medication Sig Dispense Refill   • ethambutol (MYAMBUTOL) 400 MG Tab TAKE 2 TABLETS BY MOUTH 3 TIMES A WEEK ON TUESDAY, THURSDAY AND SATURDAY 80 Tab 5   • riFAMPin (RIFADINE) 300 MG Cap Take 1 cap by mouth 3 times a week (Monday, Wednesday and Friday) 40 Cap 5   • umeclidinium-vilanterol (ANORO ELLIPTA) 62.5-25 MCG/INH AEROSOL  "POWDER, BREATH ACTIVATED inhaler Inhale 1 Puff by mouth every day. 1 Inhaler 11   • ibuprofen (MOTRIN) 200 MG Tab Take 200 mg by mouth every 6 hours as needed.     • azithromycin (ZITHROMAX) 250 MG Tab Take 1 Tab by mouth every Monday, Wednesday, and Friday. (Patient not taking: Reported on 4/30/2020) 40 Tab 5     No current facility-administered medications on file prior to visit.      /72   Pulse 70   Temp 36.6 °C (97.9 °F) (Temporal)   Resp 14   Ht 1.575 m (5' 2\")   Wt 43.1 kg (95 lb)   SpO2 95%   Family History   Problem Relation Age of Onset   • Asthma Father    • No Known Problems Sister        Physical Exam:  No distress at rest on room air  HEENT: PERRLA, EOMI, no scleral icterus, no nasal or oral lesions  Neck: No thyromegaly, no adenopathy, no bruits  Mallampatti: Grade II  Lungs: She has diffuse crackles  Heart: Regular rate and rhythm, no gallops or murmurs  Abdomen: Soft, benign, no organomegaly  Extremities: No clubbing, cyanosis, or edema  Neurologic: Cranial nerve, motor, and sensory exam are normal    1. Bronchiectasis without complication (HCC)    2. KAVITHA (mycobacterium avium-intracellulare) infection (HCC)    3. Abnormal chest CT    4. Cough    5. SOB (shortness of breath)      We will follow her every 3 months.  So far she has tolerated 3 drug therapy very well.  Her most recent sputum samples are now negative  and she has had some areas of improvement in some areas of worsening on her CT scan.  We will continue with azithromycin 250 mg, rifampin 300 mg, and ethambutol 400 mg (we have reduced this from 800 mg) 3 times weekly.  We have asked her to spread out the medications and not take them at the same time.  We have also cautioned the daughter to pay attention to her mother's hearing and vision.  She will also continue on Anoro.  She will see us sooner if she has any problems.  I have asked our office staff to please refill her medications if she happens to run out.  I have also " asked her daughter to make sure that she has medications to take with her if she plans to spend time in [place].  If she continues to be a medication compliance problem we will probably discontinue her medications.  We Will order 3 sputum for AFB/KAVITHA at the time of her next visit.  We will obtain an updated CMP and CBC now

## 2020-05-08 ENCOUNTER — HOSPITAL ENCOUNTER (EMERGENCY)
Facility: MEDICAL CENTER | Age: 80
End: 2020-05-08
Attending: EMERGENCY MEDICINE
Payer: MEDICARE

## 2020-05-08 ENCOUNTER — APPOINTMENT (OUTPATIENT)
Dept: URGENT CARE | Facility: PHYSICIAN GROUP | Age: 80
End: 2020-05-08
Payer: MEDICARE

## 2020-05-08 VITALS
SYSTOLIC BLOOD PRESSURE: 116 MMHG | BODY MASS INDEX: 20.04 KG/M2 | RESPIRATION RATE: 18 BRPM | TEMPERATURE: 99.4 F | OXYGEN SATURATION: 95 % | DIASTOLIC BLOOD PRESSURE: 67 MMHG | HEART RATE: 72 BPM | HEIGHT: 58 IN | WEIGHT: 95.46 LBS

## 2020-05-08 DIAGNOSIS — G89.29 CHRONIC PAIN OF RIGHT KNEE: ICD-10-CM

## 2020-05-08 DIAGNOSIS — M17.11 OSTEOARTHRITIS OF RIGHT KNEE, UNSPECIFIED OSTEOARTHRITIS TYPE: ICD-10-CM

## 2020-05-08 DIAGNOSIS — M25.561 CHRONIC PAIN OF RIGHT KNEE: ICD-10-CM

## 2020-05-08 PROCEDURE — 99284 EMERGENCY DEPT VISIT MOD MDM: CPT

## 2020-05-08 RX ORDER — NAPROXEN 500 MG/1
500 TABLET ORAL 2 TIMES DAILY WITH MEALS
Qty: 14 TAB | Refills: 0 | Status: SHIPPED | OUTPATIENT
Start: 2020-05-08 | End: 2020-05-14

## 2020-05-08 ASSESSMENT — FIBROSIS 4 INDEX: FIB4 SCORE: 0.97

## 2020-05-09 NOTE — ED TRIAGE NOTES
"Chief Complaint   Patient presents with   • Knee Swelling     Right knee since last night; twisted right knee last night after tripping on a rug       Pt brought to triage via wheelchair for above complaint. Pt states that she has had problems with her right knee since 2014 and had multiple injuries where she had \"pus drained from the knee\" with the last time being 2 years ago.     Pt is alert/oriented and follows commands. Pt speaking in full sentences and responds appropriately to questions. No acute distress noted in triage and respirations are even and unlabored.     Pt placed in lobby and educated on triage process. Pt encouraged to alert staff for any changes in condition.    Pt speaks Tagalog so  #[id number] was used to assist in the triage process.  "

## 2020-05-09 NOTE — ED NOTES
Discharged to home. Verbalized understanding of all discharge instruction, education, medication, and follow-up care with provider while using interpretor services. Assisted patient to triage.

## 2020-05-09 NOTE — ED PROVIDER NOTES
ED Provider Note    CHIEF COMPLAINT  Chief Complaint   Patient presents with   • Knee Swelling     Right knee since last night; twisted right knee last night after tripping on a rug      services were used in the patient's primary language of Other.     Name or Number: [id number]  Mode of interpretation: iPad    Content of Interpretation:    HPI  Irina Conrad is a 79 y.o. female who presents with right knee pain since last night.  Notes that this is a recurrent problem since a few years ago when she had a fall.  She states that she has had 2 knee aspirations in the past.  No knee surgeries in the past.  Last x-ray in our system was from May 2019 showing severe osteoarthritic changes to the right knee.  The patient denies history of immunocompromise state such as diabetes.  No use of chronic anticoagulation.  No history of recent trauma.    She states that she has had knee injections in the past that have helped with her knee pain.  She has not followed by an orthopedic physician.    REVIEW OF SYSTEMS  See [facility] for further details. All other systems are negative.     PAST MEDICAL HISTORY   has a past medical history of Bronchiectasis (HCC), Chronic cough, Falls, and Knee pain, right.    SOCIAL HISTORY  Social History     Tobacco Use   • Smoking status: Former Smoker     Packs/day: 0.00     Last attempt to quit: 1986     Years since quittin.4   • Smokeless tobacco: Never Used   • Tobacco comment: smoked during first trimester of pregnancy   Substance and Sexual Activity   • Alcohol use: Not Currently   • Drug use: No   • Sexual activity: Not Currently       SURGICAL HISTORY   has a past surgical history that includes bronchoscopy-endo (N/A, 2017).    CURRENT MEDICATIONS  Home Medications    **Home medications have not yet been reviewed for this encounter**         ALLERGIES  Allergies   Allergen Reactions   • Chocolate Shortness of Breath   • Chocolate Flavor Shortness of Breath  "      PHYSICAL EXAM  VITAL SIGNS: /67   Pulse 72   Temp 37.4 °C (99.4 °F) (Temporal)   Resp 18   Ht 1.473 m (4' 10\")   Wt 43.3 kg (95 lb 7.4 oz)   SpO2 95%   BMI 19.95 kg/m²   Pulse ox interpretation: I interpret this pulse ox as normal.  Constitutional: Alert in no apparent distress.  HENT: No signs of trauma, Bilateral external ears normal, Nose normal.   Cardiovascular: Regular rate and rhythm.   Thorax & Lungs: Normal breath sounds, No respiratory distress  Skin: Warm, Dry, No erythema, No rash.   Extremities: Intact distal pulses, mild right knee tenderness with faint effusion, no erythema, no cyanosis  Musculoskeletal: Good range of motion in all major joints.  No micromotion tenderness especially involving the right knee.  No major deformities noted.   Neurologic: Alert, Normal motor function and gait, Normal sensory function, No focal deficits noted.       DIAGNOSTIC STUDIES / PROCEDURES      COURSE & MEDICAL DECISION MAKING    Medications - No data to display    Pertinent Labs & Imaging studies reviewed. (See chart for details)  79 y.o. female presenting with acute on chronic right knee pain.  She is requesting injections like she has had in the past to help resolve her knee pain symptoms.  I informed the patient that I do not perform that procedure for symptomatic management of chronic knee pain.  No obvious signs of septic joint.  Patient does have known history of osteoarthritis.  Suspect that the patient has a flareup of her chronic knee pain.  Recommending anti-inflammatory medications and follow-up with an orthopedic surgeon.  She was given strict return precautions however for developing of erythema around the knee or increased swelling, pain, or fevers.  I did recommend that the patient follow-up with an orthopedist as soon as possible to discuss further plan of care whether it be repeat injections or possible knee replacement procedures.  The patient was instructed to rest and elevate " "the affected extremity.  She is ambulatory currently however with a slight limp favoring the contralateral unaffected extremity.    The patient was instructed to follow-up with primary care physician for further management.  To return immediately for any worsening symptoms or development of any other concerning signs or symptoms. The patient verbalizes understanding in their own words.    /67   Pulse 72   Temp 37.4 °C (99.4 °F) (Temporal)   Resp 18   Ht 1.473 m (4' 10\")   Wt 43.3 kg (95 lb 7.4 oz)   SpO2 95%   BMI 19.95 kg/m²     The patient was referred to primary care where they will receive further BP management.      Poppy Freeman M.D.  [address]   [facility] [address]  [facility] [id number] [phone]    Schedule an appointment as soon as possible for a visit       [facility], Emergency Dept  [address] [facility] [address] [phone]    As needed, If symptoms worsen    Primary care doctor    Schedule an appointment as soon as possible for a visit       Lowell Houston M.D.  [address]  [address]  [facility] [id number] [phone]    Schedule an appointment as soon as possible for a visit         FINAL IMPRESSION  1. Chronic pain of right knee    2. Osteoarthritis of right knee, unspecified osteoarthritis type            Electronically signed by: Cesar Whitten M.D., 5/8/2020 6:33 PM    "

## 2020-05-11 ENCOUNTER — TELEPHONE (OUTPATIENT)
Dept: MEDICAL GROUP | Facility: PHYSICIAN GROUP | Age: 80
End: 2020-05-11

## 2020-05-11 NOTE — TELEPHONE ENCOUNTER
Daughter LVM requesting a call back.         Phone Number Called: [phone] (home)     Call outcome: Spoke to patient regarding message below.    Message: Spoke with patient's daughter who states patient went to ER on Saturday for swollen right knee and the Naprosyn prescribed is causing nausea.  Daughter was questioning if injection/drainage would be appropriate.  Appt scheduled to see PCP for Thursday 5/14/20.

## 2020-05-14 ENCOUNTER — OFFICE VISIT (OUTPATIENT)
Dept: MEDICAL GROUP | Facility: PHYSICIAN GROUP | Age: 80
End: 2020-05-14
Payer: MEDICARE

## 2020-05-14 VITALS
BODY MASS INDEX: 23.23 KG/M2 | RESPIRATION RATE: 16 BRPM | HEIGHT: 58 IN | SYSTOLIC BLOOD PRESSURE: 126 MMHG | WEIGHT: 110.67 LBS | DIASTOLIC BLOOD PRESSURE: 68 MMHG | TEMPERATURE: 97.5 F | HEART RATE: 76 BPM | OXYGEN SATURATION: 94 %

## 2020-05-14 DIAGNOSIS — M17.11 PRIMARY OSTEOARTHRITIS OF RIGHT KNEE: ICD-10-CM

## 2020-05-14 PROCEDURE — 20610 DRAIN/INJ JOINT/BURSA W/O US: CPT | Performed by: FAMILY MEDICINE

## 2020-05-14 RX ORDER — MELOXICAM 15 MG/1
15 TABLET ORAL DAILY
Qty: 30 TAB | Refills: 2 | Status: SHIPPED | OUTPATIENT
Start: 2020-05-14 | End: 2021-12-08

## 2020-05-14 ASSESSMENT — FIBROSIS 4 INDEX: FIB4 SCORE: 0.97

## 2020-05-14 NOTE — PROGRESS NOTES
"Subjective:      Irina Conrad is a 79 y.o. female who presents with Knee Swelling (R,seen at urgent care, nausea from naproxen x 4 days )            HPI     Patient is here accompanied by her daughter for follow-up after recent ER visit at [facility] on 5//2020 for acute right knee pain and swelling.  Per daughter patient already has history of osteoarthritis of the right knee and had knee aspiration and steroid injection in the [place]  Her most recent x-ray was in March 2019 that showed severe patellofemoral osteoarthritis, mild medial femorotibial osteoarthritis and mild chondrocalcinosis.  According to the daughter when she gets the aspiration down and steroid injection she gets very good relief and she will have any more issues for a while.  At this time she does not want to consider any surgical intervention.    The knee swelling now has spread down to the leg and the ankle and the foot in the last few days.  Patient states that the pain however is not as bad as before and her range of movement is improved.    She has no recent trauma.    In the ER she was given naproxen 500 mg 1 tablet twice a day but she said it caused upset stomach so she has not been taking it.  Daughter has been giving her over-the-counter Advil which has helped.      ROS     As per HPI.     Objective:     /68 (BP Location: Left arm, Patient Position: Sitting)   Pulse 76   Temp 36.4 °C (97.5 °F) (Temporal)   Resp 16   Ht 1.473 m (4' 10\")   Wt 50.2 kg (110 lb 10.7 oz)   SpO2 94%   BMI 23.13 kg/m²      Physical Exam    Exam alert, awake, oriented, not in distress    Right lower extremity- right knee positive swelling and effusion, no redness or warmth, no pinpoint tenderness, full range of movement on passive flexion and extension without crepitation, there is mild swelling which is nonpitting of the right leg extending into the ankle and the right foot, good pedal " pulse, no calf tenderness          Assessment/Plan:       1. Primary osteoarthritis of right knee  She wants to proceed with joint aspiration and steroid injection.  Landmarks were identified.  Area cleansed with Betadine sticks x3.  I used a supero- lateral approach.  Using 18-gauge needle I aspirated 10 cc of linden-colored fluid. I injected 3 cc of 2% lidocaine, 2 cc of 0.5% Marcaine and 2 cc of 40 mg/mL of Kenalog.  Patient tolerated procedure well.  Postprocedure instructions given.  She has a follow-up appointment with me in July and she will keep that appointment.  We may need to do the injection every 3 months as needed.  At this time she does not want to consider surgical intervention such as knee replacement.  I prescribed meloxicam 15 mg to be taken half a tablet once daily with food as needed for the pain and inflammation and may go up to 1 full tablet if half a tablet does not work.  Made aware she can take it on as-needed basis only.  - meloxicam (MOBIC) 15 MG tablet; Take 1 Tab by mouth every day.  Dispense: 30 Tab; Refill: 2    Keep appointment as scheduled.      Please note that this dictation was created using voice recognition software. I have worked with consultants from the vendor as well as technical experts from [employer] to optimize the interface. I have made every reasonable attempt to correct obvious errors, but I expect that there are errors of grammar and possibly content I did not discover before finalizing the note.

## 2020-05-18 DIAGNOSIS — A31.0 MAI (MYCOBACTERIUM AVIUM-INTRACELLULARE) INFECTION (HCC): ICD-10-CM

## 2020-05-18 RX ORDER — ETHAMBUTOL HYDROCHLORIDE 400 MG/1
TABLET, FILM COATED ORAL
Qty: 80 TAB | Refills: 0 | Status: SHIPPED | OUTPATIENT
Start: 2020-05-18 | End: 2020-06-05 | Stop reason: SDUPTHER

## 2020-05-19 DIAGNOSIS — A31.0 MAI (MYCOBACTERIUM AVIUM-INTRACELLULARE) INFECTION (HCC): ICD-10-CM

## 2020-05-19 RX ORDER — ETHAMBUTOL HYDROCHLORIDE 400 MG/1
TABLET, FILM COATED ORAL
Qty: 80 TAB | Refills: 3 | Status: SHIPPED | OUTPATIENT
Start: 2020-05-19 | End: 2021-03-30 | Stop reason: SDUPTHER

## 2020-06-05 DIAGNOSIS — A31.0 MAI (MYCOBACTERIUM AVIUM-INTRACELLULARE) INFECTION (HCC): ICD-10-CM

## 2020-06-05 RX ORDER — ETHAMBUTOL HYDROCHLORIDE 400 MG/1
TABLET, FILM COATED ORAL
Qty: 80 TAB | Refills: 0 | Status: SHIPPED | OUTPATIENT
Start: 2020-06-05 | End: 2020-07-08

## 2020-07-08 ENCOUNTER — NURSE TRIAGE (OUTPATIENT)
Dept: HEALTH INFORMATION MANAGEMENT | Facility: OTHER | Age: 80
End: 2020-07-08

## 2020-07-08 ENCOUNTER — OFFICE VISIT (OUTPATIENT)
Dept: MEDICAL GROUP | Facility: PHYSICIAN GROUP | Age: 80
End: 2020-07-08
Payer: MEDICARE

## 2020-07-08 VITALS
RESPIRATION RATE: 12 BRPM | TEMPERATURE: 97.9 F | WEIGHT: 95.46 LBS | HEART RATE: 74 BPM | DIASTOLIC BLOOD PRESSURE: 76 MMHG | SYSTOLIC BLOOD PRESSURE: 130 MMHG | OXYGEN SATURATION: 93 % | HEIGHT: 58 IN | BODY MASS INDEX: 20.04 KG/M2

## 2020-07-08 DIAGNOSIS — J47.9 BRONCHIECTASIS WITHOUT COMPLICATION (HCC): ICD-10-CM

## 2020-07-08 DIAGNOSIS — A31.0 MAI (MYCOBACTERIUM AVIUM-INTRACELLULARE) INFECTION (HCC): ICD-10-CM

## 2020-07-08 DIAGNOSIS — E78.5 DYSLIPIDEMIA: ICD-10-CM

## 2020-07-08 PROCEDURE — 99214 OFFICE O/P EST MOD 30 MIN: CPT | Performed by: FAMILY MEDICINE

## 2020-07-08 ASSESSMENT — FIBROSIS 4 INDEX: FIB4 SCORE: 0.99

## 2020-07-09 NOTE — PROGRESS NOTES
Subjective:      Irina Conrad is a 80 y.o. female who presents with Cough (productive cough 3 days )            HPI     Patient returns for follow-up accompanied by her daughter.    In terms of her bronchiectasis and KAVITHA infection, she continues to follow-up closely with pulmonary specialist.  She continues to take triple drug regimen to manage the infection which are azithromycin, ethambutol and rifampin.  Denies side effects from the medications.  She also uses Anoro inhaler.  She needs refill on the inhaler.  Patient's last visit with pulmonary specialist Dr. Collazo  was in April 2020.  Her most sputum examination in January 2020 came back smear and culture negative for Mycobacterium.  Her liver enzymes have been followed and they have been normal.  No side effects from the medications.  She continues to have on and off cough which is not worse than usual with minimal sputum production.  Last CT of the chest was in June 2019 which came back multifocal airspace process/consolidation, bronchiectasis in all lobes, cavitary consolidation superior segment right lower lobe, overall other than resolution of right upper lobe consolidation there has been worsening.  Last chest x-ray was in February 2020 that showed increased bilateral interstitial markings, spiculated nodule right upper lung apex may be related to scarring.  Her next follow-up appointment with pulmonary specialist is on 8/6/2020.    In terms of her dyslipidemia, she is managing this with her own efforts only.    She has been staying home most of the time, observing precautions for COVID-19 pandemic.    Past medical history, past surgical history, family history reviewed-no changes    Social history reviewed-no changes    Allergies reviewed-no changes    Medications reviewed-no changes    ROS     As per HPI, the rest are negative.       Objective:     /76 (BP Location: Left arm, Patient Position: Sitting, BP Cuff Size: Adult)   Pulse 74   Temp  "36.6 °C (97.9 °F) (Temporal)   Resp 12   Ht 1.473 m (4' 10\")   Wt 43.3 kg (95 lb 7.4 oz)   SpO2 93%   BMI 19.95 kg/m²      Physical Exam     Examined alert, awake, oriented, not in distress    Neck-supple, no lymphadenopathy, no thyromegaly  Lungs-use coarse crackles all lung fields which is an old finding, good air exchange, no wheezes  Heart-regular rate and rhythm, no murmur  Extremities-no edema, clubbing, cyanosis           Assessment/Plan:       1. Bronchiectasis without complication (HCC)  Currently stable.  No increase in respiratory symptoms.  Continue Anoro inhaler.  Refills were sent to pharmacy.  Continue follow-up with pulmonary specialist.  - umeclidinium-vilanterol (ANORO ELLIPTA) 62.5-25 MCG/INH AEROSOL POWDER, BREATH ACTIVATED inhaler; Inhale 1 Puff by mouth every day.  Dispense: 1 Inhaler; Refill: 11    2. KAVITHA (mycobacterium avium-intracellulare) infection (HCC)  Currently stable without any increase in symptoms.  Her last sputum exam in April 2019 came back negative for the smear and culture.  Continue triple drug therapy.  She will do blood work to follow-up her kidney function and liver signs.    3. Dyslipidemia  She is managing this with her own efforts.  Last lipid panel was in January 2019 that came back LDL was only mildly elevated at 100.  We will do updated lipid panel.    She will get her flu shot this coming fall.  Keep appointment with pulmonary specialist next month.  I will see her for follow-up in 6 months or sooner if needed.      Please note that this dictation was created using voice recognition software. I have worked with consultants from the vendor as well as technical experts from [employer] to optimize the interface. I have made every reasonable attempt to correct obvious errors, but I expect that there are errors of grammar and possibly content I did not discover before finalizing the note.      "

## 2020-07-31 ENCOUNTER — TELEPHONE (OUTPATIENT)
Dept: MEDICAL GROUP | Facility: PHYSICIAN GROUP | Age: 80
End: 2020-07-31

## 2020-07-31 ENCOUNTER — HOSPITAL ENCOUNTER (OUTPATIENT)
Dept: LAB | Facility: MEDICAL CENTER | Age: 80
End: 2020-07-31
Attending: INTERNAL MEDICINE
Payer: MEDICARE

## 2020-07-31 ENCOUNTER — HOSPITAL ENCOUNTER (OUTPATIENT)
Dept: LAB | Facility: MEDICAL CENTER | Age: 80
End: 2020-07-31
Attending: FAMILY MEDICINE
Payer: MEDICARE

## 2020-07-31 DIAGNOSIS — J47.9 BRONCHIECTASIS WITHOUT COMPLICATION (HCC): ICD-10-CM

## 2020-07-31 DIAGNOSIS — A31.0 MAI (MYCOBACTERIUM AVIUM-INTRACELLULARE) INFECTION (HCC): ICD-10-CM

## 2020-07-31 DIAGNOSIS — E78.5 DYSLIPIDEMIA: ICD-10-CM

## 2020-07-31 LAB
ALBUMIN SERPL BCP-MCNC: 4.1 G/DL (ref 3.2–4.9)
ALBUMIN/GLOB SERPL: 1.1 G/DL
ALP SERPL-CCNC: 70 U/L (ref 30–99)
ALT SERPL-CCNC: 16 U/L (ref 2–50)
ANION GAP SERPL CALC-SCNC: 11 MMOL/L (ref 7–16)
AST SERPL-CCNC: 15 U/L (ref 12–45)
BASOPHILS # BLD AUTO: 0.5 % (ref 0–1.8)
BASOPHILS # BLD: 0.06 K/UL (ref 0–0.12)
BILIRUB SERPL-MCNC: 0.3 MG/DL (ref 0.1–1.5)
BUN SERPL-MCNC: 18 MG/DL (ref 8–22)
CALCIUM SERPL-MCNC: 9.3 MG/DL (ref 8.5–10.5)
CHLORIDE SERPL-SCNC: 97 MMOL/L (ref 96–112)
CHOLEST SERPL-MCNC: 193 MG/DL (ref 100–199)
CO2 SERPL-SCNC: 26 MMOL/L (ref 20–33)
CREAT SERPL-MCNC: 0.71 MG/DL (ref 0.5–1.4)
EOSINOPHIL # BLD AUTO: 0.25 K/UL (ref 0–0.51)
EOSINOPHIL NFR BLD: 2.3 % (ref 0–6.9)
ERYTHROCYTE [DISTWIDTH] IN BLOOD BY AUTOMATED COUNT: 43.8 FL (ref 35.9–50)
FASTING STATUS PATIENT QL REPORTED: NORMAL
GLOBULIN SER CALC-MCNC: 3.6 G/DL (ref 1.9–3.5)
GLUCOSE SERPL-MCNC: 85 MG/DL (ref 65–99)
HCT VFR BLD AUTO: 37 % (ref 37–47)
HDLC SERPL-MCNC: 47 MG/DL
HGB BLD-MCNC: 12.1 G/DL (ref 12–16)
IMM GRANULOCYTES # BLD AUTO: 0.06 K/UL (ref 0–0.11)
IMM GRANULOCYTES NFR BLD AUTO: 0.5 % (ref 0–0.9)
LDLC SERPL CALC-MCNC: 106 MG/DL
LYMPHOCYTES # BLD AUTO: 2.39 K/UL (ref 1–4.8)
LYMPHOCYTES NFR BLD: 21.5 % (ref 22–41)
MCH RBC QN AUTO: 33 PG (ref 27–33)
MCHC RBC AUTO-ENTMCNC: 32.7 G/DL (ref 33.6–35)
MCV RBC AUTO: 100.8 FL (ref 81.4–97.8)
MONOCYTES # BLD AUTO: 0.63 K/UL (ref 0–0.85)
MONOCYTES NFR BLD AUTO: 5.7 % (ref 0–13.4)
NEUTROPHILS # BLD AUTO: 7.72 K/UL (ref 2–7.15)
NEUTROPHILS NFR BLD: 69.5 % (ref 44–72)
NRBC # BLD AUTO: 0 K/UL
NRBC BLD-RTO: 0 /100 WBC
PLATELET # BLD AUTO: 472 K/UL (ref 164–446)
PMV BLD AUTO: 8.9 FL (ref 9–12.9)
POTASSIUM SERPL-SCNC: 4.2 MMOL/L (ref 3.6–5.5)
PROT SERPL-MCNC: 7.7 G/DL (ref 6–8.2)
RBC # BLD AUTO: 3.67 M/UL (ref 4.2–5.4)
SODIUM SERPL-SCNC: 134 MMOL/L (ref 135–145)
TRIGL SERPL-MCNC: 201 MG/DL (ref 0–149)
WBC # BLD AUTO: 11.1 K/UL (ref 4.8–10.8)

## 2020-07-31 PROCEDURE — 36415 COLL VENOUS BLD VENIPUNCTURE: CPT

## 2020-07-31 PROCEDURE — 80053 COMPREHEN METABOLIC PANEL: CPT

## 2020-07-31 PROCEDURE — 85025 COMPLETE CBC W/AUTO DIFF WBC: CPT

## 2020-07-31 PROCEDURE — 80061 LIPID PANEL: CPT

## 2020-07-31 NOTE — TELEPHONE ENCOUNTER
Phone Number Called: [phone] (home)       Call outcome: Did not leave a detailed message. Requested patient to call back.    Message: Left VM for pt to call back for results.

## 2020-07-31 NOTE — TELEPHONE ENCOUNTER
----- Message from Poppy Freeman M.D. sent at 7/31/2020  2:31 PM PDT -----  No diabetes.  Liver and kidney functions are normal.  Very mild elevation of the LDL or bad cholesterol at 106.  This needs to be below 100.  Avoid fatty foods.  Eat more fruits and vegetables and try to have fish 3 times a week to improve the cholesterol.  Triglycerides are slightly high at 201.  They need to be below 150.  Triglycerides are from carbohydrates and sweets avoid the sweets and decrease the carbs.  No anemia.  Very slight elevation of the white cell count but this could be due to her lung infection.  Very slight elevation of the platelet count which has been stable in the last 2 years.  We will continue to keep an eye on this.

## 2020-08-06 ENCOUNTER — OFFICE VISIT (OUTPATIENT)
Dept: PULMONOLOGY | Facility: HOSPICE | Age: 80
End: 2020-08-06
Payer: MEDICARE

## 2020-08-06 VITALS
WEIGHT: 95 LBS | HEIGHT: 58 IN | OXYGEN SATURATION: 99 % | HEART RATE: 76 BPM | SYSTOLIC BLOOD PRESSURE: 116 MMHG | DIASTOLIC BLOOD PRESSURE: 70 MMHG | TEMPERATURE: 97.8 F | BODY MASS INDEX: 19.94 KG/M2 | RESPIRATION RATE: 14 BRPM

## 2020-08-06 DIAGNOSIS — R05.9 COUGH: ICD-10-CM

## 2020-08-06 DIAGNOSIS — R93.89 ABNORMAL CHEST CT: ICD-10-CM

## 2020-08-06 DIAGNOSIS — R06.02 SOB (SHORTNESS OF BREATH): ICD-10-CM

## 2020-08-06 DIAGNOSIS — A31.0 MAI (MYCOBACTERIUM AVIUM-INTRACELLULARE) INFECTION (HCC): ICD-10-CM

## 2020-08-06 DIAGNOSIS — J47.9 BRONCHIECTASIS WITHOUT COMPLICATION (HCC): ICD-10-CM

## 2020-08-06 PROCEDURE — 99214 OFFICE O/P EST MOD 30 MIN: CPT | Performed by: INTERNAL MEDICINE

## 2020-08-06 ASSESSMENT — PAIN SCALES - GENERAL: PAINLEVEL: NO PAIN

## 2020-08-06 ASSESSMENT — FIBROSIS 4 INDEX: FIB4 SCORE: 0.62

## 2020-08-06 NOTE — PROGRESS NOTES
Chief Complaint   Patient presents with   • Follow-Up     review labs, history of cough       HPI:  The patient is a 80-year-old woman who was born in the [facility].  She again presents with her daughter today.  She has a CT scan showing diffuse scarring and bronchiectasis with cavitation.  Her sputum has grown KAVITHA on multiple occasions.  We have started her on therapy with azithromycin 250 mg, rifampin 300 mg, and ethambutol 800 mg Monday Wednesday and Friday.  She has taken the medication since that time but with some significant gaps in treatment.  Her most recent sputa in January 2020 were smear and culture negative.  Her liver function studies have remained normal.  She is not having any visual or hearing problems.  She is not having any GI problems tolerating the medication.  Since starting the medication her cough has improved.  Several months ago she had some left-sided chest wall pain.  This has resolved.    Past Medical History:   Diagnosis Date   • Bronchiectasis (HCC)    • Chronic cough     worse since Sept 2017   • Falls    • Knee pain, right        ROS:   Constitutional: Denies fevers, chills, night sweats, fatigue or weight loss  Eyes: Denies vision loss, pain, drainage, double vision  Ears, Nose, Throat: Denies earache, tinnitus, hoarseness  Cardiovascular: Denies chest pain, tightness, palpitations  Respiratory: Chronic shortness of breath with activity.  Cough resolved.  Sleep: Denies, snoring, apnea  GI: Denies abdominal pain, nausea, vomiting, diarrhea  : Denies frequent urination, hematuria, painful urination  Musculoskeletal: Denies back pain, painful joints, sore muscles  Neurological: Denies headaches, seizures  Skin: Denies rashes, color changes  Psychiatric: Denies depression or thoughts of suicide  Hematologic: Denies bleeding tendency or clotting tendency  Allergic/Immunologic: Denies rhinitis, skin sensitivity    Social History     Socioeconomic History   • Marital status:       Spouse name: Not on file   • Number of children: Not on file   • Years of education: Not on file   • Highest education level: Not on file   Occupational History   • Occupation: cafe in [employer]   • Occupation: originally from the [employer]     Comment: here since 2017   Social Needs   • Financial resource strain: Not on file   • Food insecurity     Worry: Not on file     Inability: Not on file   • Transportation needs     Medical: Not on file     Non-medical: Not on file   Tobacco Use   • Smoking status: Former Smoker     Packs/day: 0.00     Quit date: 1986     Years since quittin.7   • Smokeless tobacco: Never Used   • Tobacco comment: smoked during first trimester of pregnancy   Substance and Sexual Activity   • Alcohol use: Not Currently   • Drug use: No   • Sexual activity: Not Currently   Lifestyle   • Physical activity     Days per week: Not on file     Minutes per session: Not on file   • Stress: Not on file   Relationships   • Social connections     Talks on phone: Not on file     Gets together: Not on file     Attends Hoahaoism service: Not on file     Active member of club or organization: Not on file     Attends meetings of clubs or organizations: Not on file     Relationship status: Not on file   • Intimate partner violence     Fear of current or ex partner: Not on file     Emotionally abused: Not on file     Physically abused: Not on file     Forced sexual activity: Not on file   Other Topics Concern   • Not on file   Social History Narrative   • Not on file     Chocolate and Chocolate flavor  Current Outpatient Medications on File Prior to Visit   Medication Sig Dispense Refill   • umeclidinium-vilanterol (ANORO ELLIPTA) 62.5-25 MCG/INH AEROSOL POWDER, BREATH ACTIVATED inhaler Inhale 1 Puff by mouth every day. 1 Inhaler 11   • ethambutol (MYAMBUTOL) 400 MG Tab Take 2 tablets by mouth 3 times a week; Tuesday, Thursday, and Saturday (Patient taking differently: Take 1 tablets by  "mouth 3 times a week; Tuesday, Thursday, and Saturday) 80 Tab 3   • meloxicam (MOBIC) 15 MG tablet Take 1 Tab by mouth every day. 30 Tab 2   • riFAMPin (RIFADINE) 300 MG Cap Take 1 cap by mouth 3 times a week (Monday, Wednesday and Friday) 40 Cap 5   • azithromycin (ZITHROMAX) 250 MG Tab Take 1 Tab by mouth every Monday, Wednesday, and Friday. 40 Tab 5     No current facility-administered medications on file prior to visit.      /70   Pulse 76   Temp 36.6 °C (97.8 °F) (Temporal)   Resp 14   Ht 1.473 m (4' 10\")   Wt 43.1 kg (95 lb)   SpO2 99%   Family History   Problem Relation Age of Onset   • Asthma Father    • No Known Problems Sister        Physical Exam:  No distress on room air.  Small, elderly female  HEENT: PERRLA, EOMI, no scleral icterus, no nasal or oral lesions  Neck: No thyromegaly, no adenopathy, no bruits  Mallampatti: Grade II  Lungs: She has crackles  Heart: Regular rate and rhythm, no gallops or murmurs  Abdomen: Soft, benign, no organomegaly  Extremities: No clubbing, cyanosis, or edema  Neurologic: Cranial nerve, motor, and sensory exam are normal    1. Bronchiectasis without complication (HCC)    2. KAVITHA (mycobacterium avium-intracellulare) infection (HCC)    3. Abnormal chest CT    4. Cough    5. SOB (shortness of breath)      We will follow her every 3 months.  So far she has tolerated 3 drug therapy very well.  Her most recent sputum samples are now negative  and she has had some areas of improvement in some areas of worsening on her CT scan.  We will continue with azithromycin 250 mg, rifampin 300 mg, and ethambutol 400 mg (we have reduced this from 800 mg) 3 times weekly.  We have asked her to spread out the medications and not take them at the same time.  We have also cautioned the daughter to pay attention to her mother's hearing and vision.  She will also continue on Anoro.  She will see us sooner if she has any problems.  I have asked our office staff to please refill her " medications if she happens to run out.  I have also asked her daughter to make sure that she has medications to take with her if she plans to spend time in [place].  If she continues to be a medication compliance problem we will probably discontinue her medications.  We Will order 3 sputum for AFB/KAVITHA.  We will obtain a CMP and CBC 3 months.

## 2020-08-07 ENCOUNTER — HOSPITAL ENCOUNTER (OUTPATIENT)
Facility: MEDICAL CENTER | Age: 80
End: 2020-08-07
Attending: INTERNAL MEDICINE
Payer: MEDICARE

## 2020-08-07 DIAGNOSIS — R05.9 COUGH: ICD-10-CM

## 2020-08-07 DIAGNOSIS — J47.9 BRONCHIECTASIS WITHOUT COMPLICATION (HCC): ICD-10-CM

## 2020-08-07 DIAGNOSIS — A31.0 MAI (MYCOBACTERIUM AVIUM-INTRACELLULARE) INFECTION (HCC): ICD-10-CM

## 2020-08-07 PROCEDURE — 87015 SPECIMEN INFECT AGNT CONCNTJ: CPT

## 2020-08-07 PROCEDURE — 87116 MYCOBACTERIA CULTURE: CPT

## 2020-08-07 PROCEDURE — 87206 SMEAR FLUORESCENT/ACID STAI: CPT

## 2020-08-08 ENCOUNTER — HOSPITAL ENCOUNTER (OUTPATIENT)
Facility: MEDICAL CENTER | Age: 80
End: 2020-08-08
Attending: INTERNAL MEDICINE
Payer: MEDICARE

## 2020-08-08 DIAGNOSIS — R05.9 COUGH: ICD-10-CM

## 2020-08-08 DIAGNOSIS — A31.0 MAI (MYCOBACTERIUM AVIUM-INTRACELLULARE) INFECTION (HCC): ICD-10-CM

## 2020-08-08 DIAGNOSIS — J47.9 BRONCHIECTASIS WITHOUT COMPLICATION (HCC): ICD-10-CM

## 2020-08-08 PROCEDURE — 87015 SPECIMEN INFECT AGNT CONCNTJ: CPT

## 2020-08-08 PROCEDURE — 87206 SMEAR FLUORESCENT/ACID STAI: CPT

## 2020-08-08 PROCEDURE — 87116 MYCOBACTERIA CULTURE: CPT

## 2020-08-09 ENCOUNTER — HOSPITAL ENCOUNTER (OUTPATIENT)
Facility: MEDICAL CENTER | Age: 80
End: 2020-08-09
Attending: INTERNAL MEDICINE
Payer: MEDICARE

## 2020-08-09 PROCEDURE — 87206 SMEAR FLUORESCENT/ACID STAI: CPT

## 2020-08-09 PROCEDURE — 87015 SPECIMEN INFECT AGNT CONCNTJ: CPT

## 2020-08-09 PROCEDURE — 87116 MYCOBACTERIA CULTURE: CPT

## 2020-08-10 DIAGNOSIS — A31.0 MAI (MYCOBACTERIUM AVIUM-INTRACELLULARE) INFECTION (HCC): ICD-10-CM

## 2020-08-10 DIAGNOSIS — J47.9 BRONCHIECTASIS WITHOUT COMPLICATION (HCC): ICD-10-CM

## 2020-08-10 DIAGNOSIS — R05.9 COUGH: ICD-10-CM

## 2020-08-25 ENCOUNTER — TELEPHONE (OUTPATIENT)
Dept: PULMONOLOGY | Facility: HOSPICE | Age: 80
End: 2020-08-25

## 2020-08-25 NOTE — TELEPHONE ENCOUNTER
[facility] calling they have a positive AFB result on this patient. They are [facility]ional [facility].    Providers were routed a  This message

## 2020-12-02 NOTE — TELEPHONE ENCOUNTER
1. Caller Name: daughter                Call Back Number: cell  [facility] PCP or Specialty Provider: Giancarlo      2.  In the last two weeks, has the patient had any new or worsening symptoms (not explained by alternative diagnosis)? Yes, the patient reports the following COVID-19 consistent symptoms: cough. Cough is ongoing r/t chronic pulm disease. Dtr states no change in cough; denies all other s/s illness.     At the office for OV for appt with Pete. Instructed to call for clearing. .       3.  Does patient have any comoribidities? COPD    4.  Has the patient traveled in the last 14 days OR had any known contact with someone who is suspected or confirmed to have COVID-19?  No.    5. Disposition: Cleared by RN Triage as potential is low for COVID-19; OK to keep/schedule appointment    Note routed to [facility] Provider: ADRIANNA only.    The Pharmacist called and states [employer] Will not cover the Lotrel 2.5-10 mg BID. But they will cover the Lotrel 5-20 mg Once daily. This was sent to Dr. Rivera for approval

## 2021-01-11 ENCOUNTER — APPOINTMENT (OUTPATIENT)
Dept: MEDICAL GROUP | Facility: PHYSICIAN GROUP | Age: 81
End: 2021-01-11
Payer: MEDICARE

## 2021-01-11 DIAGNOSIS — Z23 NEED FOR VACCINATION: ICD-10-CM

## 2021-02-01 DIAGNOSIS — A31.0 MAI (MYCOBACTERIUM AVIUM-INTRACELLULARE) INFECTION (HCC): ICD-10-CM

## 2021-02-01 RX ORDER — RIFAMPIN 300 MG/1
CAPSULE ORAL
Qty: 40 CAP | Refills: 3 | Status: SHIPPED | OUTPATIENT
Start: 2021-02-01 | End: 2022-03-18

## 2021-03-16 DIAGNOSIS — A31.0 MAI (MYCOBACTERIUM AVIUM-INTRACELLULARE) INFECTION (HCC): ICD-10-CM

## 2021-03-16 RX ORDER — AZITHROMYCIN 250 MG/1
250 TABLET, FILM COATED ORAL
Qty: 40 TABLET | Refills: 3 | Status: SHIPPED | OUTPATIENT
Start: 2021-03-17 | End: 2022-04-15

## 2021-03-30 ENCOUNTER — OFFICE VISIT (OUTPATIENT)
Dept: MEDICAL GROUP | Facility: PHYSICIAN GROUP | Age: 81
End: 2021-03-30
Payer: MEDICARE

## 2021-03-30 VITALS
HEIGHT: 58 IN | TEMPERATURE: 97.6 F | SYSTOLIC BLOOD PRESSURE: 120 MMHG | OXYGEN SATURATION: 99 % | DIASTOLIC BLOOD PRESSURE: 60 MMHG | HEART RATE: 82 BPM | BODY MASS INDEX: 20.84 KG/M2 | WEIGHT: 99.27 LBS

## 2021-03-30 DIAGNOSIS — A31.0 MAI (MYCOBACTERIUM AVIUM-INTRACELLULARE) INFECTION (HCC): ICD-10-CM

## 2021-03-30 DIAGNOSIS — E78.5 DYSLIPIDEMIA: ICD-10-CM

## 2021-03-30 DIAGNOSIS — J47.9 BRONCHIECTASIS WITHOUT COMPLICATION (HCC): ICD-10-CM

## 2021-03-30 PROCEDURE — 99214 OFFICE O/P EST MOD 30 MIN: CPT | Performed by: FAMILY MEDICINE

## 2021-03-30 RX ORDER — ETHAMBUTOL HYDROCHLORIDE 400 MG/1
TABLET, FILM COATED ORAL
Qty: 80 TABLET | Refills: 3 | Status: SHIPPED | OUTPATIENT
Start: 2021-03-30 | End: 2022-04-15

## 2021-03-30 ASSESSMENT — PATIENT HEALTH QUESTIONNAIRE - PHQ9: CLINICAL INTERPRETATION OF PHQ2 SCORE: 0

## 2021-03-30 ASSESSMENT — FIBROSIS 4 INDEX: FIB4 SCORE: 0.62

## 2021-03-31 ENCOUNTER — IMMUNIZATION (OUTPATIENT)
Dept: FAMILY PLANNING/WOMEN'S HEALTH CLINIC | Facility: IMMUNIZATION CENTER | Age: 81
End: 2021-03-31
Payer: MEDICARE

## 2021-03-31 DIAGNOSIS — Z23 ENCOUNTER FOR VACCINATION: Primary | ICD-10-CM

## 2021-03-31 PROCEDURE — 91300 PFIZER SARS-COV-2 VACCINE: CPT | Performed by: INTERNAL MEDICINE

## 2021-03-31 PROCEDURE — 0001A PFIZER SARS-COV-2 VACCINE: CPT | Performed by: INTERNAL MEDICINE

## 2021-04-22 ENCOUNTER — IMMUNIZATION (OUTPATIENT)
Dept: FAMILY PLANNING/WOMEN'S HEALTH CLINIC | Facility: IMMUNIZATION CENTER | Age: 81
End: 2021-04-22
Payer: MEDICARE

## 2021-04-22 DIAGNOSIS — Z23 ENCOUNTER FOR VACCINATION: Primary | ICD-10-CM

## 2021-04-22 PROCEDURE — 91300 PFIZER SARS-COV-2 VACCINE: CPT

## 2021-04-22 PROCEDURE — 0002A PFIZER SARS-COV-2 VACCINE: CPT

## 2021-06-25 ENCOUNTER — HOSPITAL ENCOUNTER (OUTPATIENT)
Dept: LAB | Facility: MEDICAL CENTER | Age: 81
End: 2021-06-25
Attending: FAMILY MEDICINE
Payer: MEDICARE

## 2021-06-25 DIAGNOSIS — J47.9 BRONCHIECTASIS WITHOUT COMPLICATION (HCC): ICD-10-CM

## 2021-06-25 DIAGNOSIS — A31.0 MAI (MYCOBACTERIUM AVIUM-INTRACELLULARE) INFECTION (HCC): ICD-10-CM

## 2021-06-25 DIAGNOSIS — E78.5 DYSLIPIDEMIA: ICD-10-CM

## 2021-06-25 LAB
ALBUMIN SERPL BCP-MCNC: 3.9 G/DL (ref 3.2–4.9)
ALBUMIN/GLOB SERPL: 0.9 G/DL
ALP SERPL-CCNC: 77 U/L (ref 30–99)
ALT SERPL-CCNC: 13 U/L (ref 2–50)
ANION GAP SERPL CALC-SCNC: 12 MMOL/L (ref 7–16)
AST SERPL-CCNC: 26 U/L (ref 12–45)
BASOPHILS # BLD AUTO: 0.5 % (ref 0–1.8)
BASOPHILS # BLD: 0.08 K/UL (ref 0–0.12)
BILIRUB SERPL-MCNC: 0.3 MG/DL (ref 0.1–1.5)
BUN SERPL-MCNC: 14 MG/DL (ref 8–22)
CALCIUM SERPL-MCNC: 9 MG/DL (ref 8.5–10.5)
CHLORIDE SERPL-SCNC: 103 MMOL/L (ref 96–112)
CHOLEST SERPL-MCNC: 170 MG/DL (ref 100–199)
CO2 SERPL-SCNC: 27 MMOL/L (ref 20–33)
CREAT SERPL-MCNC: 0.81 MG/DL (ref 0.5–1.4)
EOSINOPHIL # BLD AUTO: 0.39 K/UL (ref 0–0.51)
EOSINOPHIL NFR BLD: 2.6 % (ref 0–6.9)
ERYTHROCYTE [DISTWIDTH] IN BLOOD BY AUTOMATED COUNT: 46 FL (ref 35.9–50)
FASTING STATUS PATIENT QL REPORTED: NORMAL
GLOBULIN SER CALC-MCNC: 4.3 G/DL (ref 1.9–3.5)
GLUCOSE SERPL-MCNC: 76 MG/DL (ref 65–99)
HCT VFR BLD AUTO: 38.1 % (ref 37–47)
HDLC SERPL-MCNC: 48 MG/DL
HGB BLD-MCNC: 12.1 G/DL (ref 12–16)
IMM GRANULOCYTES # BLD AUTO: 0.09 K/UL (ref 0–0.11)
IMM GRANULOCYTES NFR BLD AUTO: 0.6 % (ref 0–0.9)
LDLC SERPL CALC-MCNC: 83 MG/DL
LYMPHOCYTES # BLD AUTO: 2.18 K/UL (ref 1–4.8)
LYMPHOCYTES NFR BLD: 14.8 % (ref 22–41)
MCH RBC QN AUTO: 31.9 PG (ref 27–33)
MCHC RBC AUTO-ENTMCNC: 31.8 G/DL (ref 33.6–35)
MCV RBC AUTO: 100.5 FL (ref 81.4–97.8)
MONOCYTES # BLD AUTO: 0.84 K/UL (ref 0–0.85)
MONOCYTES NFR BLD AUTO: 5.7 % (ref 0–13.4)
NEUTROPHILS # BLD AUTO: 11.15 K/UL (ref 2–7.15)
NEUTROPHILS NFR BLD: 75.8 % (ref 44–72)
NRBC # BLD AUTO: 0 K/UL
NRBC BLD-RTO: 0 /100 WBC
PLATELET # BLD AUTO: 506 K/UL (ref 164–446)
PMV BLD AUTO: 9 FL (ref 9–12.9)
POTASSIUM SERPL-SCNC: 4.1 MMOL/L (ref 3.6–5.5)
PROT SERPL-MCNC: 8.2 G/DL (ref 6–8.2)
RBC # BLD AUTO: 3.79 M/UL (ref 4.2–5.4)
SODIUM SERPL-SCNC: 142 MMOL/L (ref 135–145)
TRIGL SERPL-MCNC: 194 MG/DL (ref 0–149)
WBC # BLD AUTO: 14.7 K/UL (ref 4.8–10.8)

## 2021-06-25 PROCEDURE — 80053 COMPREHEN METABOLIC PANEL: CPT

## 2021-06-25 PROCEDURE — 85025 COMPLETE CBC W/AUTO DIFF WBC: CPT

## 2021-06-25 PROCEDURE — 80061 LIPID PANEL: CPT

## 2021-06-25 PROCEDURE — 36415 COLL VENOUS BLD VENIPUNCTURE: CPT

## 2021-12-07 ENCOUNTER — APPOINTMENT (OUTPATIENT)
Dept: RADIOLOGY | Facility: MEDICAL CENTER | Age: 81
DRG: 196 | End: 2021-12-07
Attending: EMERGENCY MEDICINE
Payer: MEDICARE

## 2021-12-07 ENCOUNTER — HOSPITAL ENCOUNTER (INPATIENT)
Facility: MEDICAL CENTER | Age: 81
LOS: 1 days | DRG: 196 | End: 2021-12-09
Attending: EMERGENCY MEDICINE | Admitting: STUDENT IN AN ORGANIZED HEALTH CARE EDUCATION/TRAINING PROGRAM
Payer: MEDICARE

## 2021-12-07 DIAGNOSIS — A31.0 MYCOBACTERIUM AVIUM INFECTION (HCC): ICD-10-CM

## 2021-12-07 DIAGNOSIS — D72.829 LEUKOCYTOSIS, UNSPECIFIED TYPE: ICD-10-CM

## 2021-12-07 DIAGNOSIS — R09.02 HYPOXIA: ICD-10-CM

## 2021-12-07 DIAGNOSIS — J47.0 BRONCHIECTASIS WITH ACUTE LOWER RESPIRATORY INFECTION (HCC): ICD-10-CM

## 2021-12-07 LAB
ALBUMIN SERPL BCP-MCNC: 4.1 G/DL (ref 3.2–4.9)
ALBUMIN/GLOB SERPL: 1.1 G/DL
ALP SERPL-CCNC: 101 U/L (ref 30–99)
ALT SERPL-CCNC: 13 U/L (ref 2–50)
ANION GAP SERPL CALC-SCNC: 11 MMOL/L (ref 7–16)
AST SERPL-CCNC: 21 U/L (ref 12–45)
BASOPHILS # BLD AUTO: 0.6 % (ref 0–1.8)
BASOPHILS # BLD: 0.13 K/UL (ref 0–0.12)
BILIRUB SERPL-MCNC: 0.2 MG/DL (ref 0.1–1.5)
BUN SERPL-MCNC: 14 MG/DL (ref 8–22)
CALCIUM SERPL-MCNC: 8.3 MG/DL (ref 8.5–10.5)
CHLORIDE SERPL-SCNC: 99 MMOL/L (ref 96–112)
CO2 SERPL-SCNC: 24 MMOL/L (ref 20–33)
CREAT SERPL-MCNC: 0.75 MG/DL (ref 0.5–1.4)
EKG IMPRESSION: NORMAL
EOSINOPHIL # BLD AUTO: 0.07 K/UL (ref 0–0.51)
EOSINOPHIL NFR BLD: 0.3 % (ref 0–6.9)
ERYTHROCYTE [DISTWIDTH] IN BLOOD BY AUTOMATED COUNT: 42.1 FL (ref 35.9–50)
GLOBULIN SER CALC-MCNC: 3.8 G/DL (ref 1.9–3.5)
GLUCOSE SERPL-MCNC: 98 MG/DL (ref 65–99)
HCT VFR BLD AUTO: 35.5 % (ref 37–47)
HGB BLD-MCNC: 11.7 G/DL (ref 12–16)
IMM GRANULOCYTES # BLD AUTO: 0.2 K/UL (ref 0–0.11)
IMM GRANULOCYTES NFR BLD AUTO: 0.9 % (ref 0–0.9)
LYMPHOCYTES # BLD AUTO: 1.42 K/UL (ref 1–4.8)
LYMPHOCYTES NFR BLD: 6.6 % (ref 22–41)
MCH RBC QN AUTO: 31.8 PG (ref 27–33)
MCHC RBC AUTO-ENTMCNC: 33 G/DL (ref 33.6–35)
MCV RBC AUTO: 96.5 FL (ref 81.4–97.8)
MONOCYTES # BLD AUTO: 1.01 K/UL (ref 0–0.85)
MONOCYTES NFR BLD AUTO: 4.7 % (ref 0–13.4)
NEUTROPHILS # BLD AUTO: 18.62 K/UL (ref 2–7.15)
NEUTROPHILS NFR BLD: 86.9 % (ref 44–72)
NRBC # BLD AUTO: 0 K/UL
NRBC BLD-RTO: 0 /100 WBC
PLATELET # BLD AUTO: 592 K/UL (ref 164–446)
PMV BLD AUTO: 8.5 FL (ref 9–12.9)
POTASSIUM SERPL-SCNC: 4 MMOL/L (ref 3.6–5.5)
PROCALCITONIN SERPL-MCNC: 0.23 NG/ML
PROT SERPL-MCNC: 7.9 G/DL (ref 6–8.2)
RBC # BLD AUTO: 3.68 M/UL (ref 4.2–5.4)
SODIUM SERPL-SCNC: 134 MMOL/L (ref 135–145)
WBC # BLD AUTO: 21.5 K/UL (ref 4.8–10.8)

## 2021-12-07 PROCEDURE — 84145 PROCALCITONIN (PCT): CPT

## 2021-12-07 PROCEDURE — 85025 COMPLETE CBC W/AUTO DIFF WBC: CPT

## 2021-12-07 PROCEDURE — U0003 INFECTIOUS AGENT DETECTION BY NUCLEIC ACID (DNA OR RNA); SEVERE ACUTE RESPIRATORY SYNDROME CORONAVIRUS 2 (SARS-COV-2) (CORONAVIRUS DISEASE [COVID-19]), AMPLIFIED PROBE TECHNIQUE, MAKING USE OF HIGH THROUGHPUT TECHNOLOGIES AS DESCRIBED BY CMS-2020-01-R: HCPCS

## 2021-12-07 PROCEDURE — 700117 HCHG RX CONTRAST REV CODE 255: Performed by: EMERGENCY MEDICINE

## 2021-12-07 PROCEDURE — 83880 ASSAY OF NATRIURETIC PEPTIDE: CPT

## 2021-12-07 PROCEDURE — 87040 BLOOD CULTURE FOR BACTERIA: CPT

## 2021-12-07 PROCEDURE — 99285 EMERGENCY DEPT VISIT HI MDM: CPT

## 2021-12-07 PROCEDURE — 71045 X-RAY EXAM CHEST 1 VIEW: CPT

## 2021-12-07 PROCEDURE — 80053 COMPREHEN METABOLIC PANEL: CPT

## 2021-12-07 PROCEDURE — U0005 INFEC AGEN DETEC AMPLI PROBE: HCPCS

## 2021-12-07 PROCEDURE — 71275 CT ANGIOGRAPHY CHEST: CPT | Mod: ME

## 2021-12-07 PROCEDURE — 93005 ELECTROCARDIOGRAM TRACING: CPT | Performed by: EMERGENCY MEDICINE

## 2021-12-07 RX ADMIN — IOHEXOL 30 ML: 350 INJECTION, SOLUTION INTRAVENOUS at 14:00

## 2021-12-07 ASSESSMENT — FIBROSIS 4 INDEX: FIB4 SCORE: 1.13

## 2021-12-08 PROBLEM — J96.01 ACUTE HYPOXEMIC RESPIRATORY FAILURE (HCC): Status: ACTIVE | Noted: 2021-12-08

## 2021-12-08 PROBLEM — J96.01 ACUTE RESPIRATORY FAILURE WITH HYPOXEMIA (HCC): Status: ACTIVE | Noted: 2021-12-08

## 2021-12-08 PROBLEM — D72.9 NEUTROPHILIC LEUKOCYTOSIS: Status: ACTIVE | Noted: 2021-12-08

## 2021-12-08 LAB
GRAM STN SPEC: NORMAL
GRAM STN SPEC: NORMAL
NT-PROBNP SERPL IA-MCNC: 212 PG/ML (ref 0–125)
SARS-COV-2 RNA RESP QL NAA+PROBE: NOTDETECTED
SIGNIFICANT IND 70042: NORMAL
SIGNIFICANT IND 70042: NORMAL
SITE SITE: NORMAL
SITE SITE: NORMAL
SOURCE SOURCE: NORMAL
SOURCE SOURCE: NORMAL
SPECIMEN SOURCE: NORMAL

## 2021-12-08 PROCEDURE — 99223 1ST HOSP IP/OBS HIGH 75: CPT | Mod: AI | Performed by: STUDENT IN AN ORGANIZED HEALTH CARE EDUCATION/TRAINING PROGRAM

## 2021-12-08 PROCEDURE — 700111 HCHG RX REV CODE 636 W/ 250 OVERRIDE (IP): Performed by: STUDENT IN AN ORGANIZED HEALTH CARE EDUCATION/TRAINING PROGRAM

## 2021-12-08 PROCEDURE — 770006 HCHG ROOM/CARE - MED/SURG/GYN SEMI*

## 2021-12-08 PROCEDURE — 700102 HCHG RX REV CODE 250 W/ 637 OVERRIDE(OP): Performed by: STUDENT IN AN ORGANIZED HEALTH CARE EDUCATION/TRAINING PROGRAM

## 2021-12-08 PROCEDURE — 87040 BLOOD CULTURE FOR BACTERIA: CPT

## 2021-12-08 PROCEDURE — 700102 HCHG RX REV CODE 250 W/ 637 OVERRIDE(OP): Performed by: INTERNAL MEDICINE

## 2021-12-08 PROCEDURE — 99223 1ST HOSP IP/OBS HIGH 75: CPT | Mod: GC | Performed by: INTERNAL MEDICINE

## 2021-12-08 PROCEDURE — 96372 THER/PROPH/DIAG INJ SC/IM: CPT

## 2021-12-08 PROCEDURE — 87205 SMEAR GRAM STAIN: CPT

## 2021-12-08 PROCEDURE — A9270 NON-COVERED ITEM OR SERVICE: HCPCS | Performed by: INTERNAL MEDICINE

## 2021-12-08 PROCEDURE — A9270 NON-COVERED ITEM OR SERVICE: HCPCS | Performed by: STUDENT IN AN ORGANIZED HEALTH CARE EDUCATION/TRAINING PROGRAM

## 2021-12-08 RX ORDER — DIAPER,BRIEF,INFANT-TODD,DISP
2 EACH MISCELLANEOUS PRN
COMMUNITY
End: 2022-02-02

## 2021-12-08 RX ORDER — ETHAMBUTOL HYDROCHLORIDE 400 MG/1
800 TABLET, FILM COATED ORAL
Status: DISCONTINUED | OUTPATIENT
Start: 2021-12-10 | End: 2021-12-08

## 2021-12-08 RX ORDER — ONDANSETRON 2 MG/ML
4 INJECTION INTRAMUSCULAR; INTRAVENOUS EVERY 4 HOURS PRN
Status: DISCONTINUED | OUTPATIENT
Start: 2021-12-08 | End: 2021-12-09 | Stop reason: HOSPADM

## 2021-12-08 RX ORDER — AZITHROMYCIN 250 MG/1
250 TABLET, FILM COATED ORAL
Status: DISCONTINUED | OUTPATIENT
Start: 2021-12-10 | End: 2021-12-09 | Stop reason: HOSPADM

## 2021-12-08 RX ORDER — RIFAMPIN 300 MG/1
300 CAPSULE ORAL
Status: DISCONTINUED | OUTPATIENT
Start: 2021-12-10 | End: 2021-12-08

## 2021-12-08 RX ORDER — HEPARIN SODIUM 5000 [USP'U]/ML
5000 INJECTION, SOLUTION INTRAVENOUS; SUBCUTANEOUS EVERY 8 HOURS
Status: DISCONTINUED | OUTPATIENT
Start: 2021-12-08 | End: 2021-12-08

## 2021-12-08 RX ORDER — RIFAMPIN 300 MG/1
300 CAPSULE ORAL
Status: DISCONTINUED | OUTPATIENT
Start: 2021-12-08 | End: 2021-12-09 | Stop reason: HOSPADM

## 2021-12-08 RX ORDER — HEPARIN SODIUM 5000 [USP'U]/ML
5000 INJECTION, SOLUTION INTRAVENOUS; SUBCUTANEOUS EVERY 8 HOURS
Status: DISCONTINUED | OUTPATIENT
Start: 2021-12-08 | End: 2021-12-09 | Stop reason: HOSPADM

## 2021-12-08 RX ORDER — ACETAMINOPHEN 325 MG/1
650 TABLET ORAL EVERY 6 HOURS PRN
Status: DISCONTINUED | OUTPATIENT
Start: 2021-12-08 | End: 2021-12-09 | Stop reason: HOSPADM

## 2021-12-08 RX ORDER — ETHAMBUTOL HYDROCHLORIDE 400 MG/1
800 TABLET, FILM COATED ORAL
Status: DISCONTINUED | OUTPATIENT
Start: 2021-12-09 | End: 2021-12-09 | Stop reason: HOSPADM

## 2021-12-08 RX ADMIN — RIFAMPIN 300 MG: 300 CAPSULE ORAL at 18:22

## 2021-12-08 RX ADMIN — HEPARIN SODIUM 5000 UNITS: 5000 INJECTION, SOLUTION INTRAVENOUS; SUBCUTANEOUS at 09:44

## 2021-12-08 RX ADMIN — HEPARIN SODIUM 5000 UNITS: 5000 INJECTION, SOLUTION INTRAVENOUS; SUBCUTANEOUS at 18:15

## 2021-12-08 RX ADMIN — UMECLIDINIUM BROMIDE AND VILANTEROL TRIFENATATE 1 PUFF: 62.5; 25 POWDER RESPIRATORY (INHALATION) at 06:34

## 2021-12-08 ASSESSMENT — ENCOUNTER SYMPTOMS
BLURRED VISION: 0
DIZZINESS: 0
WHEEZING: 0
WEAKNESS: 0
HEADACHES: 0
DIAPHORESIS: 0
PALPITATIONS: 0
MUSCULOSKELETAL NEGATIVE: 1
SHORTNESS OF BREATH: 1
CONSTIPATION: 0
CHILLS: 0
SPUTUM PRODUCTION: 1
EYES NEGATIVE: 1
DIARRHEA: 0
HEMOPTYSIS: 0
CARDIOVASCULAR NEGATIVE: 1
FEVER: 0
GASTROINTESTINAL NEGATIVE: 1
VOMITING: 0
DEPRESSION: 0
ORTHOPNEA: 0
PSYCHIATRIC NEGATIVE: 1
ABDOMINAL PAIN: 0
DOUBLE VISION: 0
COUGH: 1
NAUSEA: 0
NEUROLOGICAL NEGATIVE: 1
MYALGIAS: 0

## 2021-12-08 ASSESSMENT — PAIN DESCRIPTION - PAIN TYPE: TYPE: ACUTE PAIN

## 2021-12-08 NOTE — PROGRESS NOTES
Ms. Irina Conrad is a 81 y.o. female who was admitted this morning by my colleague Dr. Ronald Floyd for shortness of breath, hypoxic respiratory failure.  I personally reviewed the H&P, labs and imaging.  For full details please refer to H&P.  In summary    Patient was seen and examined at bedside.      Ms. Irina Conrad is a 81 y.o. female history of interstitial lung disease not on home oxygen who presented on 12/7/2021 with shortness of breath.  Patient presented with respiratory failure requiring 1 L supplemental oxygen.  She recently ran out of her Anoro Ellipta inhaler.  CT pulmonary angiogram showed no pulmonary embolus, chronic changes to include bronchiectasis and scattered reticular nodular opacities.  She did have significant leukocytosis of 21.5.  Procalcitonin was within normal limits at 0.23.  Covid pending, respiratory culture showed no growth.  Consulted pulmonology.  We will continue antibiotics for KAVITHA.  Will collect AFB cultures.  Wean supplemental oxygen as tolerated

## 2021-12-08 NOTE — H&P
Hospital Medicine History & Physical Note    Date of Service  12/8/2021    Primary Care Physician  Poppy Freeman M.D.    Consultants  None    Code Status  Full Code    Chief Complaint  Chief Complaint   Patient presents with   • Shortness of Breath   • Cough       History of Presenting Illness  Irina Conrad is a 81 y.o. female who presented 12/7/2021 with shortness of breath and increased frequency of coughing.  Patient has a history of interstitial lung disease and has been following with pulmonary outpatient.  She states that for the last 3 to 4 years, she has been taking medications for KAVITHA.  Chronically she states that she has always had cough with green phlegm.  She does not use any oxygen at baseline.  Patient for the last week states that she ran out of Anoro Ellipta.  Today, when daughter went to visit her, she was reported to be coughing more frequently and short of breath, motivating daughter to call EMS to take patient to ED for evaluation.  Patient denies fever chills chest pain dizziness diarrhea.    In the ED, patient found to be hypoxic in the 80s.  Upon placing 1 L nasal cannula patient, oxygen improved to 95%. remarkable labs include neutrophilic leukocytosis, normocytic anemia, thrombocytosis.  Procalcitonin found to be within normal limits.  CTPA showing no pulmonary embolism, however bronchiectasis and scattered reticular nodular opacities seen increased in extent but similar to character in prior study.    I discussed the plan of care with patient.    Review of Systems  Review of Systems   Constitutional: Positive for malaise/fatigue.   HENT: Negative.    Eyes: Negative.    Respiratory: Positive for cough and shortness of breath.    Cardiovascular: Negative.    Gastrointestinal: Negative.    Genitourinary: Negative.    Musculoskeletal: Negative.    Skin: Negative.    Neurological: Negative.    Endo/Heme/Allergies: Negative.    Psychiatric/Behavioral: Negative.        Past Medical  History   has a past medical history of Bronchiectasis (HCC), Chronic cough, Falls, and Knee pain, right.    Surgical History   has a past surgical history that includes bronchoscopy-endo (N/A, 11/19/2017).     Family History  family history includes Asthma in her father; No Known Problems in her sister.   Family history reviewed with patient. There is no family history that is pertinent to the chief complaint.     Social History   reports that she quit smoking about 35 years ago. She smoked 0.00 packs per day. She has never used smokeless tobacco. She reports previous alcohol use. She reports that she does not use drugs.    Allergies  Allergies   Allergen Reactions   • Chocolate Shortness of Breath   • Chocolate Flavor Shortness of Breath       Medications  Prior to Admission Medications   Prescriptions Last Dose Informant Patient Reported? Taking?   azithromycin (ZITHROMAX) 250 MG Tab   No No   Sig: Take 1 tablet by mouth every Monday, Wednesday, and Friday.   ethambutol (MYAMBUTOL) 400 MG Tab   No No   Sig: Take 2 tablets by mouth 3 times a week; Tuesday, Thursday, and Saturday   meloxicam (MOBIC) 15 MG tablet   No No   Sig: Take 1 Tab by mouth every day.   Patient not taking: Reported on 3/30/2021   riFAMPin (RIFADINE) 300 MG Cap   No No   Sig: Take 1 cap by mouth 3 times a week (Monday, Wednesday and Friday)   umeclidinium-vilanterol (ANORO ELLIPTA) 62.5-25 MCG/INH AEROSOL POWDER, BREATH ACTIVATED inhaler   No No   Sig: Inhale 1 Puff every day.      Facility-Administered Medications: None       Physical Exam  Temp:  [36.6 °C (97.8 °F)-36.8 °C (98.2 °F)] 36.6 °C (97.8 °F)  Pulse:  [77-95] 93  Resp:  [16-20] 18  BP: (125-151)/(66-72) 151/68  SpO2:  [81 %-100 %] 99 %  Blood Pressure : 151/68   Temperature: 36.6 °C (97.8 °F)   Pulse: 93   Respiration: 18   Pulse Oximetry: 99 %       Physical Exam  Constitutional:       Appearance: Normal appearance. She is normal weight.   HENT:      Head: Normocephalic.      Nose:  Nose normal.      Mouth/Throat:      Mouth: Mucous membranes are moist.   Eyes:      Pupils: Pupils are equal, round, and reactive to light.   Cardiovascular:      Rate and Rhythm: Normal rate and regular rhythm.      Pulses: Normal pulses.   Pulmonary:      Effort: Pulmonary effort is normal.      Comments: Mild crackles are heard throughout all lung fields  Abdominal:      General: Abdomen is flat. Bowel sounds are normal.      Palpations: Abdomen is soft.   Musculoskeletal:         General: No swelling. Normal range of motion.      Cervical back: Normal range of motion.   Skin:     General: Skin is warm.   Neurological:      General: No focal deficit present.      Mental Status: She is alert and oriented to person, place, and time. Mental status is at baseline.   Psychiatric:         Mood and Affect: Mood normal.         Behavior: Behavior normal.         Thought Content: Thought content normal.         Judgment: Judgment normal.         Laboratory:  Recent Labs     12/07/21  1724   WBC 21.5*   RBC 3.68*   HEMOGLOBIN 11.7*   HEMATOCRIT 35.5*   MCV 96.5   MCH 31.8   MCHC 33.0*   RDW 42.1   PLATELETCT 592*   MPV 8.5*     Recent Labs     12/07/21  1724   SODIUM 134*   POTASSIUM 4.0   CHLORIDE 99   CO2 24   GLUCOSE 98   BUN 14   CREATININE 0.75   CALCIUM 8.3*     Recent Labs     12/07/21  1724   ALTSGPT 13   ASTSGOT 21   ALKPHOSPHAT 101*   TBILIRUBIN 0.2   GLUCOSE 98         Recent Labs     12/07/21  2251   NTPROBNP 212*         No results for input(s): TROPONINT in the last 72 hours.    Imaging:  CT-CTA CHEST PULMONARY ARTERY W/ RECONS   Final Result         1.  No pulmonary embolus appreciated.   2.  Bronchiectasis and scattered reticular nodular opacities, increased in extent but similar in character to prior study. Appearance favors chronic changes. No focal infiltrate is appreciated.   3.  Atherosclerosis and atherosclerotic coronary artery disease.      DX-CHEST-PORTABLE (1 VIEW)   Final Result         1.   Interstitial pulmonary parenchymal prominence suggest chronic underlying lung disease, component of interstitial edema and/or infiltrates not excluded.   2.  Ill-defined nodular density right apex, less well pronounced compared to prior study, previous recommendation for CT chest is noted, additional workup as clinically appropriate.   3.  Atherosclerosis          X-Ray:  I have personally reviewed the images and compared with prior images.    Assessment/Plan:  I anticipate this patient will require at least two midnights for appropriate medical management, necessitating inpatient admission.    Acute hypoxemic respiratory failure (HCC)  Assessment & Plan  Admit patient to telemetry  Oxygen, keep O2 over 90%  Suspect that this is due to patient running out of anoro, resume anoro  Pulmonology consult in a.m.    Neutrophilic leukocytosis  Assessment & Plan  Elevated compared to baseline, however suspect that this could be reactive  Would refrain on giving antibiotics for now as procalcitonin is negative  Blood cultures          VTE prophylaxis: heparin ppx

## 2021-12-08 NOTE — CONSULTS
Pulmonary Consultation    Date of Service: 12/8/2021    Date of Admission:  12/7/2021  8:12 PM    Consulting Provider:  Maksim Wilburn D.O.     Chief Complaint:  Shortness of Breath and Cough      History of Present Illness/Hospital Course: Irina Conrad is a 81 y.o. female with a past medical history of bronchiectasis and Mycobacterium avium intracellulare (diagnosed in 2017) with last AFB culture positive in 03/2019, hx of Pseudomonas (multi-drug resistant on BAL in 2017) who is followed by Dr. Collazo in Pulmonology clinic (last visit in 08/2020), who presented and was admitted for acute hypoxic respiratory failure. She reports that she ran out of Anoro Ellipta for the last week and has been having increasing shortness of breath the past few days. She continues to have her chronic gray-white sputum production, not acutely changed. She has not ran out of her KAVITHA drugs Azithromycin, Rifampin, Ethambutol and has continued compliance with her regimen.     Since arrival patient has been afebrile, HD stable and requiring 1L O2 on NC. Sputum cultures negative to date. Has received her COVID vaccination, and COVID test pending. CXR showing mild interstitial pulmonary prominence.     Patient will need follow up soon in outpatient pulmonology clinic and while here will collect AFB cultures x 3 for culture and sensitivities. This is not TB.     Review of Systems   Constitutional: Negative for chills, diaphoresis and fever.   HENT: Negative for hearing loss.    Eyes: Negative for blurred vision and double vision.   Respiratory: Positive for cough, sputum production and shortness of breath. Negative for hemoptysis and wheezing.         Chronic cough and sputum production - no acute change   Cardiovascular: Negative for chest pain, palpitations, orthopnea and leg swelling.   Gastrointestinal: Negative for abdominal pain, constipation, diarrhea, nausea and vomiting.   Genitourinary: Negative for dysuria and  hematuria.   Musculoskeletal: Negative for myalgias.   Skin: Negative for rash.   Neurological: Negative for dizziness, weakness and headaches.   Psychiatric/Behavioral: Negative for depression.       Home Medications     Reviewed by Fox Perea (Pharmacy Tech) on 21 at 0135  Med List Status: Complete   Medication Last Dose Status   azithromycin (ZITHROMAX) 250 MG Tab 2021 Active   ethambutol (MYAMBUTOL) 400 MG Tab 2021 Active   hydrocortisone 1 % Ointment prn Active   riFAMPin (RIFADINE) 300 MG Cap 2021 Active   umeclidinium-vilanterol (ANORO ELLIPTA) 62.5-25 MCG/INH AEROSOL POWDER, BREATH ACTIVATED inhaler ~1 week Active                Social History     Tobacco Use   • Smoking status: Former Smoker     Packs/day: 0.00     Quit date: 1986     Years since quittin.0   • Smokeless tobacco: Never Used   • Tobacco comment: smoked during first trimester of pregnancy   Vaping Use   • Vaping Use: Never used   Substance Use Topics   • Alcohol use: Not Currently   • Drug use: No        Past Medical History:   Diagnosis Date   • Bronchiectasis (HCC)    • Chronic cough     worse since 2017   • Falls    • Knee pain, right        Past Surgical History:   Procedure Laterality Date   • BRONCHOSCOPY-ENDO N/A 2017    Procedure: BRONCHOSCOPY-ENDO;  Surgeon: Chriss Goldberg M.D.;  [facility]: [facility];  Service: Ent       Allergies: Chocolate and Chocolate flavor    Family History   Problem Relation Age of Onset   • Asthma Father    • No Known Problems Sister        Pulmonary-Specific Vital Signs  Vitals  Blood Pressure : 156/65  Temperature: 36.3 °C (97.3 °F)  Temp src: Temporal  Pulse: 91  Respiration: 20  Pulse Oximetry: 93 %  Weight: 44.5 kg (98 lb 1.7 oz)    Physical Examination  Physical Exam  Vitals and nursing note reviewed.   Constitutional:       General: She is not in acute distress.     Appearance: She is not diaphoretic.   HENT:      Head:  Normocephalic and atraumatic.      Mouth/Throat:      Mouth: Mucous membranes are moist.   Eyes:      Conjunctiva/sclera: Conjunctivae normal.      Pupils: Pupils are equal, round, and reactive to light.   Cardiovascular:      Rate and Rhythm: Normal rate and regular rhythm.      Pulses: Normal pulses.      Heart sounds: No murmur heard.  No friction rub. No gallop.    Pulmonary:      Effort: Pulmonary effort is normal. No respiratory distress.      Breath sounds: Rhonchi present. No wheezing or rales.   Abdominal:      General: Bowel sounds are normal. There is no distension.      Palpations: Abdomen is soft.      Tenderness: There is no abdominal tenderness.   Musculoskeletal:         General: No tenderness.      Cervical back: Neck supple.      Right lower leg: No edema.      Left lower leg: No edema.   Skin:     General: Skin is warm and dry.      Capillary Refill: Capillary refill takes less than 2 seconds.   Neurological:      Mental Status: She is alert and oriented to person, place, and time. Mental status is at baseline.   Psychiatric:         Mood and Affect: Mood normal.           No intake or output data in the 24 hours ending 12/08/21 1142    Recent Labs     12/07/21  1724   WBC 21.5*   NEUTSPOLYS 86.90*   LYMPHOCYTES 6.60*   MONOCYTES 4.70   EOSINOPHILS 0.30   BASOPHILS 0.60   ASTSGOT 21   ALTSGPT 13   ALKPHOSPHAT 101*   TBILIRUBIN 0.2     Recent Labs     12/07/21  1724   SODIUM 134*   POTASSIUM 4.0   CHLORIDE 99   CO2 24   BUN 14   CREATININE 0.75   CALCIUM 8.3*     Recent Labs     12/07/21  1724   ALTSGPT 13   ASTSGOT 21   ALKPHOSPHAT 101*   TBILIRUBIN 0.2   GLUCOSE 98         CT-CTA CHEST PULMONARY ARTERY W/ RECONS   Final Result         1.  No pulmonary embolus appreciated.   2.  Bronchiectasis and scattered reticular nodular opacities, increased in extent but similar in character to prior study. Appearance favors chronic changes. No focal infiltrate is appreciated.   3.  Atherosclerosis and  atherosclerotic coronary artery disease.      DX-CHEST-PORTABLE (1 VIEW)   Final Result         1.  Interstitial pulmonary parenchymal prominence suggest chronic underlying lung disease, component of interstitial edema and/or infiltrates not excluded.   2.  Ill-defined nodular density right apex, less well pronounced compared to prior study, previous recommendation for CT chest is noted, additional workup as clinically appropriate.   3.  Atherosclerosis          Patient Active Problem List   Diagnosis   • Cough   • Risk for falls   • Anemia   • Bronchiectasis without complication (HCC)   • KAVITHA (mycobacterium avium-intracellulare) infection (HCC)   • Neutrophilic leukocytosis   • Acute hypoxemic respiratory failure (HCC)   • Acute respiratory failure with hypoxemia (HCC)       Assessment and Recommendations:  #Bronchiectasis  #Mycobacterium avium intracellulare  #Acute hypoxic respiratory failure  -Procal negative  -Sputum cx no growth to date  -Leukocytosis  Plan:  -Follow sputum cx - no growth to date  -Recommend restarting KAVITHA medications including Azithromycin + Rifampin + Ethambutol  -AFB cultures x 3 - send to [facility] for sensitivities  -Will facilitate soon follow up in outpatient Pulmonology clinic    Thank you for this consult.  Available on [employer].    Sridhar Conklin M.D.,    The patient was seen and plan discussed with my attending, Dr. Prather.

## 2021-12-08 NOTE — ASSESSMENT & PLAN NOTE
Improving  Home oxygen eval patient needs oxygen on exertion 1 L only  Home oxygen was ordered  Suspect that this is due to patient running out of anoro, korey anoro  Pulmonology consult in a.m.

## 2021-12-08 NOTE — PROGRESS NOTES
Pt transferring to [id number]. Shivani DRAKE here to take pt to S6. Belongings collected. Called Lien DRAKE, given report. Called Toña, daughter, updated her on room change.

## 2021-12-08 NOTE — ASSESSMENT & PLAN NOTE
Elevated compared to baseline, however suspect that this could be reactive  Normal procalcitonin no fever  Patient is on azithromycin as outpatient  Blood cultures is negative till now

## 2021-12-08 NOTE — ED NOTES
Med Rec completed: per pt's daughter at bedside     No ORAL antibiotics in last 30 days    Preferred [facility]: [facility]     Pt confirmed following allergies:  Allergies   Allergen Reactions   • Chocolate Shortness of Breath   • Chocolate Flavor Shortness of Breath      Pt's home medications:   No current facility-administered medications on file prior to encounter.     Medication Sig   • hydrocortisone 1 % Ointment Apply 2 g topically as needed (itching).   • ethambutol (MYAMBUTOL) 400 MG Tab Take 2 tablets by mouth 3 times a week; Tuesday, Thursday, and Saturday   • umeclidinium-vilanterol (ANORO ELLIPTA) 62.5-25 MCG/INH AEROSOL POWDER, BREATH ACTIVATED inhaler Inhale 1 Puff every day.   • azithromycin (ZITHROMAX) 250 MG Tab Take 1 tablet by mouth every Monday, Wednesday, and Friday.   • riFAMPin (RIFADINE) 300 MG Cap Take 1 cap by mouth 3 times a week (Monday, Wednesday and Friday)     Removed medications:   Medication Removal Reason   • [DISCONTINUED] meloxicam (MOBIC) 15 MG tablet Pt reports not taking

## 2021-12-08 NOTE — ED TRIAGE NOTES
Pt ambulatory to triage c/o productive cough that began this am and sob. Pt denies pain denies fever. Pt family states she has hx of lung disease. Nad. vss

## 2021-12-08 NOTE — ED PROVIDER NOTES
ED Provider Note    Scribed for Janice Reynaga M.D. by Josue Cortez-Reyes. 12/7/2021, 8:43 PM.    Primary care provider: Poppy Freeman M.D.  Means of arrival: Walk-in  History obtained from: Patient  History limited by: None    CHIEF COMPLAINT  Chief Complaint   Patient presents with   • Shortness of Breath   • Cough       HPI  Irina Conrad is a 81 y.o. female who presents to the Emergency Department for evaluation of shortness of breath onset this morning.  She stated that she was so short of breath she felt that she was about to pass out. She endorses an additional productive cough, with gray sputum which is unchanged in color or quantity compared to baseline and denies any fever. She denies any chest pain, however does have a bit of right upper quadrant pain exacerbated with coughing.  She denies any orthopnea, leg swelling, or known cardiac history.  The patient's family member states that the patient has a history of lung disease and has been on antibiotics and inhalers for years. Per chart review, she has a history of Mycobacterium avium diagnosed in 2017 and bronchiectasis and is currently on azithromycin 250 mg, rifampin 300 mg, and ethambutol 400 mg.  She also takes Anoro Ellipta and follows with Dr. Collazo, pulmonologist outpatient.  She was vaccinated for COVID-19 in March 2021.  Patient stated that she ran out of her Anoro inhaler about 1 week ago.  She states that she does not have a rescue inhaler. According to her daughter, both her son in law ans grandson were ill with bronchitis 3 weeks ago.       REVIEW OF SYSTEMS  Pertinent positives include shortness of breath, cough, and sputum production.     Pertinent negatives include no other pain or illness, no diarrhea, melena, hematochezia.   All other systems reviewed and negative.    PAST MEDICAL HISTORY   has a past medical history of Bronchiectasis (HCC), Chronic cough, Falls, and Knee pain, right.  Mycobacterium avium  infection    SURGICAL HISTORY   has a past surgical history that includes bronchoscopy-endo (N/A, 2017).    SOCIAL HISTORY  Social History     Tobacco Use   • Smoking status: Former Smoker     Packs/day: 0.00     Quit date: 1986     Years since quittin.0   • Smokeless tobacco: Never Used   • Tobacco comment: smoked during first trimester of pregnancy   Vaping Use   • Vaping Use: Never used   Substance Use Topics   • Alcohol use: Not Currently   • Drug use: No      Social History     Substance and Sexual Activity   Drug Use No       FAMILY HISTORY  Family History   Problem Relation Age of Onset   • Asthma Father    • No Known Problems Sister        CURRENT MEDICATIONS  Home Medications     Reviewed by Nicole Becker R.N. (Registered Nurse) on 21 at 1657  Med List Status: Partial   Medication Last Dose Status   azithromycin (ZITHROMAX) 250 MG Tab  Active   ethambutol (MYAMBUTOL) 400 MG Tab  Active   meloxicam (MOBIC) 15 MG tablet  Active   riFAMPin (RIFADINE) 300 MG Cap  Active   umeclidinium-vilanterol (ANORO ELLIPTA) 62.5-25 MCG/INH AEROSOL POWDER, BREATH ACTIVATED inhaler  Active                ALLERGIES  Allergies   Allergen Reactions   • Chocolate Shortness of Breath   • Chocolate Flavor Shortness of Breath       PHYSICAL EXAM  VITAL SIGNS: /66   Pulse 87   Temp 36.6 °C (97.8 °F) (Temporal)   Resp 18   Wt 44.5 kg (98 lb 1.7 oz)     Oxygen saturation 100% on 1 L, 91-92% on room air  BMI 20.50 kg/m² .    Constitutional: Alert in no apparent distress.  HENT: No signs of trauma, Bilateral external ears normal, Nose normal.   Eyes: Pupils are equal and reactive, Conjunctiva normal, Non-icteric.   Neck: Normal range of motion, No tenderness, Supple, No stridor.   Cardiovascular: Regular rate and rhythm, no murmurs.   Thorax & Lungs: Inspiratory and expiratory rales in all lung fields, no orthopnea  Abdomen: Bowel sounds normal, Soft, No tenderness, No masses, No peritoneal  signs.  Skin: Warm, Dry, No erythema, No rash.   Musculoskeletal:  No major deformities noted.  No edema  Neurologic: Alert, moving all extremities without difficulty, no focal deficits.    LABS  Labs Reviewed   CBC WITH DIFFERENTIAL - Abnormal; Notable for the following components:       Result Value    WBC 21.5 (*)     RBC 3.68 (*)     Hemoglobin 11.7 (*)     Hematocrit 35.5 (*)     MCHC 33.0 (*)     Platelet Count 592 (*)     MPV 8.5 (*)     Neutrophils-Polys 86.90 (*)     Lymphocytes 6.60 (*)     Neutrophils (Absolute) 18.62 (*)     Monos (Absolute) 1.01 (*)     Baso (Absolute) 0.13 (*)     Immature Granulocytes (abs) 0.20 (*)     All other components within normal limits    Narrative:     Collected By:   COMP METABOLIC PANEL - Abnormal; Notable for the following components:    Sodium 134 (*)     Calcium 8.3 (*)     Alkaline Phosphatase 101 (*)     Globulin 3.8 (*)     All other components within normal limits    Narrative:     Collected By:   ESTIMATED GFR    Narrative:     Collected By:   SARS-COV-2, PCR (IN-HOUSE)    Narrative:     Collected By:  Have you been in close contact with a person who is suspected  or known to be positive for COVID-19 within the last 30 days  (e.g. last seen that person < 30 days ago)->No   PROCALCITONIN    Narrative:     Collected By:   PROBRAIN NATRIURETIC PEPTIDE, NT    Narrative:     Collected By:   CULTURE RESPIRATORY W/ GRM STN   BLOOD CULTURE   BLOOD CULTURE     All labs reviewed by me.    EKG  12 Lead EKG interpreted by me to show:  Results for orders placed or performed during the hospital encounter of 21   EKG   Result Value Ref Range    Report       [facility] Emergency Dept.    Test Date:  2021  Pt Name:    DEMETRIUS GOTTI              Department: ER  MRN:        [id number]                      Room:  Gender:     Female                       Technician: [id number]  :        [id number]                   Requested By:ER TRIAGE PROTOCOL  Order #:     [id number]                    Reading MD:    Measurements  Intervals                                Axis  Rate:       81                           P:          66  ND:         156                          QRS:        69  QRSD:       82                           T:          61  QT:         368  QTc:        428    Interpretive Statements  SINUS RHYTHM  Compared to ECG 11/22/2017 10:15:33  No significant changes       RADIOLOGY  CT-CTA CHEST PULMONARY ARTERY W/ RECONS   Final Result         1.  No pulmonary embolus appreciated.   2.  Bronchiectasis and scattered reticular nodular opacities, increased in extent but similar in character to prior study. Appearance favors chronic changes. No focal infiltrate is appreciated.   3.  Atherosclerosis and atherosclerotic coronary artery disease.      DX-CHEST-PORTABLE (1 VIEW)   Final Result         1.  Interstitial pulmonary parenchymal prominence suggest chronic underlying lung disease, component of interstitial edema and/or infiltrates not excluded.   2.  Ill-defined nodular density right apex, less well pronounced compared to prior study, previous recommendation for CT chest is noted, additional workup as clinically appropriate.   3.  Atherosclerosis        The radiologist's interpretation of all radiological studies have been reviewed by me.    COURSE & MEDICAL DECISION MAKING  Pertinent Labs & Imaging studies reviewed. (See chart for details)    Differential diagnoses include but are not limited to: Pneumonia in addition to Mycobacterium avium, COPD exacerbation, pulmonary embolism.  Patient does have a history of Mycobacterium avium pneumonia and bronchiectasis, obstructive and restrictive lung disease.  She does report acute worsening of breath this morning after she ran out of her inhaler 1 week ago.  She also does report recent sick contacts as 2 of her family members with whom she lives both had bronchitis 3 weeks ago.  She presented with rales on exam however no  wheezes, therefore COPD or asthma exacerbation are less likely.  She has no known cardiac history and no echocardiogram on file, however does not present with any orthopnea that therefore rales on exam are likely secondary to bronchiectasis.     8:43 PM - Patient seen and examined at bedside. Patient will be treated with . Ordered  CBC, CMP, CXR, procal to evaluate her symptoms.     Labs notable for WBC 21.5, Hgb 11.7    11:32 Patient desaturated to 81% on room air.     11:40 Informed patient's daughter that she will be admitted due to new onset hypoxia    11:59 PM CTPE negative for PE , revealed worsening bronchiectasis    Decision Making:  This is a 81 y.o. year old female with PMH of MAC complicated by bronchiectasis who presents with acute onset shortness of breath and cough after  going one week without anoro ellipta and with 2 recently ill contacts at home. She is also due for her COVID booster.  Labs are notable for leukocytosis which makes pneumonia more likely.    Covid PCR, CT PE pending    If she does in fact have a secondary pneumonia in addition to MAC, she will require antipseudomonal coverage, as BAL cultures in the past grew Pseudomonas and she is at increased risk due to bronchiectasis.    FINAL IMPRESSION  1. Leukocytosis, unspecified type    2. Hypoxia    3. Mycobacterium avium infection (HCC)    4. Bronchiectasis with acute lower respiratory infection (HCC)         This dictation has been created using voice recognition software and/or scribes. The accuracy of the dictation is limited by the abilities of the software and the expertise of the scribes. I expect there may be some errors of grammar and possibly content. I made every attempt to manually correct the errors within my dictation. However, errors related to voice recognition software and/or scribes may still exist and should be interpreted within the appropriate context.     ILiz have participated in the care of this patient  under the supervision of, Janice Reynaga M.D.     Electronically signed by: Josue Cortez-Reyes (Scribe), 12/7/2021    I independently evaluated the patient and repeated the important components of the history and physical. I discussed the management with the resident. I have reviewed and agree with the pertinent clinical information above including history, exam, study findings, and recommendations.     In summary this is a 81-year-old female with history of bronchiectasis presenting with increased cough and new onset hypoxia.  She will require hospitalization for this new onset hypoxia.  I will defer antibiotic choice to the admitting team.  Patient is hospitalized in guarded condition.    The note accurately reflects work and decisions made by me.  Janice Reynaga M.D.  12/11/2021  2:31 PM      IJanice M.D. personally performed the services described in this documentation, as scribed by Josue Cortez-Reyes in my presence, and it is both accurate and complete. C.

## 2021-12-08 NOTE — ED NOTES
Patient brought back to [place] [id number], hooked up to monitors o2 was 100% on 1L, put her on room air and was stating at 100%. After a coughing fit o2 dropped to 86% room air, placed her on 1L again and stating at 98%.

## 2021-12-08 NOTE — ED NOTES
Rounded on patient. Patient resting in bed. No signs of distress noted. Equal chest rise and fall. Head of bed adjusted. No further needs at this time. Call light within reach.

## 2021-12-08 NOTE — ED NOTES
Patient to room from lobby with steady gait. Changed into gown, connected to monitor. Agree with triage note. Charted for ERP. Call light within reach. MD at bedside.

## 2021-12-08 NOTE — ED NOTES
Patient noted to have removed her oxygen, saturation dropped to 87%. Placed back on 1 L NC. Saturation 96% at this time.

## 2021-12-09 VITALS
OXYGEN SATURATION: 96 % | BODY MASS INDEX: 20.59 KG/M2 | SYSTOLIC BLOOD PRESSURE: 114 MMHG | DIASTOLIC BLOOD PRESSURE: 88 MMHG | RESPIRATION RATE: 16 BRPM | HEART RATE: 86 BPM | TEMPERATURE: 98.4 F | WEIGHT: 98.11 LBS | HEIGHT: 58 IN

## 2021-12-09 PROBLEM — J96.01 ACUTE HYPOXEMIC RESPIRATORY FAILURE (HCC): Status: RESOLVED | Noted: 2021-12-08 | Resolved: 2021-12-09

## 2021-12-09 LAB
ANION GAP SERPL CALC-SCNC: 10 MMOL/L (ref 7–16)
BASOPHILS # BLD AUTO: 0.3 % (ref 0–1.8)
BASOPHILS # BLD: 0.04 K/UL (ref 0–0.12)
BUN SERPL-MCNC: 14 MG/DL (ref 8–22)
CALCIUM SERPL-MCNC: 9.1 MG/DL (ref 8.5–10.5)
CHLORIDE SERPL-SCNC: 99 MMOL/L (ref 96–112)
CO2 SERPL-SCNC: 27 MMOL/L (ref 20–33)
CREAT SERPL-MCNC: 0.72 MG/DL (ref 0.5–1.4)
EOSINOPHIL # BLD AUTO: 0.23 K/UL (ref 0–0.51)
EOSINOPHIL NFR BLD: 1.6 % (ref 0–6.9)
ERYTHROCYTE [DISTWIDTH] IN BLOOD BY AUTOMATED COUNT: 42.3 FL (ref 35.9–50)
GLUCOSE SERPL-MCNC: 98 MG/DL (ref 65–99)
HCT VFR BLD AUTO: 36.4 % (ref 37–47)
HGB BLD-MCNC: 12 G/DL (ref 12–16)
IMM GRANULOCYTES # BLD AUTO: 0.14 K/UL (ref 0–0.11)
IMM GRANULOCYTES NFR BLD AUTO: 1 % (ref 0–0.9)
LYMPHOCYTES # BLD AUTO: 1.79 K/UL (ref 1–4.8)
LYMPHOCYTES NFR BLD: 12.7 % (ref 22–41)
MAGNESIUM SERPL-MCNC: 2 MG/DL (ref 1.5–2.5)
MCH RBC QN AUTO: 32.4 PG (ref 27–33)
MCHC RBC AUTO-ENTMCNC: 33 G/DL (ref 33.6–35)
MCV RBC AUTO: 98.4 FL (ref 81.4–97.8)
MONOCYTES # BLD AUTO: 1.03 K/UL (ref 0–0.85)
MONOCYTES NFR BLD AUTO: 7.3 % (ref 0–13.4)
NEUTROPHILS # BLD AUTO: 10.9 K/UL (ref 2–7.15)
NEUTROPHILS NFR BLD: 77.1 % (ref 44–72)
NRBC # BLD AUTO: 0 K/UL
NRBC BLD-RTO: 0 /100 WBC
PHOSPHATE SERPL-MCNC: 2.8 MG/DL (ref 2.5–4.5)
PLATELET # BLD AUTO: 536 K/UL (ref 164–446)
PMV BLD AUTO: 8.6 FL (ref 9–12.9)
POTASSIUM SERPL-SCNC: 3.6 MMOL/L (ref 3.6–5.5)
RBC # BLD AUTO: 3.7 M/UL (ref 4.2–5.4)
SODIUM SERPL-SCNC: 136 MMOL/L (ref 135–145)
WBC # BLD AUTO: 14.1 K/UL (ref 4.8–10.8)

## 2021-12-09 PROCEDURE — 83735 ASSAY OF MAGNESIUM: CPT

## 2021-12-09 PROCEDURE — 80048 BASIC METABOLIC PNL TOTAL CA: CPT

## 2021-12-09 PROCEDURE — 36415 COLL VENOUS BLD VENIPUNCTURE: CPT

## 2021-12-09 PROCEDURE — 700111 HCHG RX REV CODE 636 W/ 250 OVERRIDE (IP): Performed by: STUDENT IN AN ORGANIZED HEALTH CARE EDUCATION/TRAINING PROGRAM

## 2021-12-09 PROCEDURE — 85025 COMPLETE CBC W/AUTO DIFF WBC: CPT

## 2021-12-09 PROCEDURE — 84100 ASSAY OF PHOSPHORUS: CPT

## 2021-12-09 PROCEDURE — 99239 HOSP IP/OBS DSCHRG MGMT >30: CPT | Performed by: INTERNAL MEDICINE

## 2021-12-09 RX ADMIN — HEPARIN SODIUM 5000 UNITS: 5000 INJECTION, SOLUTION INTRAVENOUS; SUBCUTANEOUS at 05:01

## 2021-12-09 RX ADMIN — HEPARIN SODIUM 5000 UNITS: 5000 INJECTION, SOLUTION INTRAVENOUS; SUBCUTANEOUS at 14:20

## 2021-12-09 RX ADMIN — UMECLIDINIUM BROMIDE AND VILANTEROL TRIFENATATE 1 PUFF: 62.5; 25 POWDER RESPIRATORY (INHALATION) at 05:01

## 2021-12-09 ASSESSMENT — COGNITIVE AND FUNCTIONAL STATUS - GENERAL
SUGGESTED CMS G CODE MODIFIER DAILY ACTIVITY: CJ
TOILETING: A LITTLE
DAILY ACTIVITIY SCORE: 22
MOBILITY SCORE: 22
CLIMB 3 TO 5 STEPS WITH RAILING: A LITTLE
SUGGESTED CMS G CODE MODIFIER MOBILITY: CJ
WALKING IN HOSPITAL ROOM: A LITTLE
HELP NEEDED FOR BATHING: A LITTLE

## 2021-12-09 ASSESSMENT — PATIENT HEALTH QUESTIONNAIRE - PHQ9
2. FEELING DOWN, DEPRESSED, IRRITABLE, OR HOPELESS: NOT AT ALL
SUM OF ALL RESPONSES TO PHQ9 QUESTIONS 1 AND 2: 0
1. LITTLE INTEREST OR PLEASURE IN DOING THINGS: NOT AT ALL

## 2021-12-09 ASSESSMENT — LIFESTYLE VARIABLES
HAVE YOU EVER FELT YOU SHOULD CUT DOWN ON YOUR DRINKING: NO
EVER HAD A DRINK FIRST THING IN THE MORNING TO STEADY YOUR NERVES TO GET RID OF A HANGOVER: NO
EVER FELT BAD OR GUILTY ABOUT YOUR DRINKING: NO
ON A TYPICAL DAY WHEN YOU DRINK ALCOHOL HOW MANY DRINKS DO YOU HAVE: 0
TOTAL SCORE: 0
HAVE PEOPLE ANNOYED YOU BY CRITICIZING YOUR DRINKING: NO
TOTAL SCORE: 0
CONSUMPTION TOTAL: NEGATIVE
AVERAGE NUMBER OF DAYS PER WEEK YOU HAVE A DRINK CONTAINING ALCOHOL: 0
HOW MANY TIMES IN THE PAST YEAR HAVE YOU HAD 5 OR MORE DRINKS IN A DAY: 0
ALCOHOL_USE: NO
TOTAL SCORE: 0

## 2021-12-09 ASSESSMENT — ENCOUNTER SYMPTOMS
WEIGHT LOSS: 0
COUGH: 1
CHILLS: 0
SPUTUM PRODUCTION: 0

## 2021-12-09 NOTE — CARE PLAN
The patient is Stable - Low risk of patient condition declining or worsening    Shift Goals  Clinical Goals: Titrate O2  Patient Goals: rest    Progress made toward(s) clinical / shift goals:  SpO2>90 on 1L NC, will wean as tolerated.      Problem: Knowledge Deficit - Standard  Goal: Patient and family/care givers will demonstrate understanding of plan of care, disease process/condition, diagnostic tests and medications  Outcome: Progressing       Patient is not progressing towards the following goals: N/A

## 2021-12-09 NOTE — PROGRESS NOTES
Hospital Medicine Daily Progress Note    Date of Service  12/9/2021    Chief Complaint  Irina Conrad is a 81 y.o. female admitted 12/7/2021 with shortness of breath    Hospital Course:    81-year-old with history of bronchiectasis presented 12/9 with worsening shortness of breath and dry coughing patient has interstitial lung disease/bronchiectasis and followed by pulmonary clinic, patient is on room air at home, denies fever all chills denies significant chest pain and no lower extremity edema, on admission labs showed leukocytosis 14, with normal kidney function, procalcitonin was normal and blood culture and COVID-19 were negative, CTA for chest did not show PE and no significant changes on her bronchiectasis, patient was admitted for acute respiratory failure and she was treated with oxygen only, continue home inhalers and azithromycin      Interval Problem Update  -Evaluated examined the patient at bedside, patient denies significant symptoms, improving on her respiratory symptoms  -Patient is on room air however she needs 1 L on exertion, home oxygen was ordered  -Continue holding antibiotics  -Patient can be discharged today if the other can pick her up and she received oxygen  -Case was discussed with her daughter Ms. Ding, answered all her questions, discussed the plan of care and discussed the need of oxygen, daughter at understood and agreed.      I have personally seen and examined the patient at bedside. I discussed the plan of care with patient and bedside RN.    Consultants/Specialty  none     Code Status  Full Code    Disposition  Patient is not medically cleared.  Possible discharge tomorrow morning  Anticipate discharge to to home with close outpatient follow-up.  I have placed the appropriate orders for post-discharge needs.    Review of Systems  Review of Systems   Constitutional: Negative for chills and weight loss.   Respiratory: Positive for cough. Negative for sputum production.     All other systems reviewed and are negative.       Physical Exam  Temp:  [36.3 °C (97.3 °F)-37.2 °C (99 °F)] 36.9 °C (98.4 °F)  Pulse:  [82-99] 86  Resp:  [16-18] 16  BP: (100-142)/(60-88) 114/88  SpO2:  [93 %-98 %] 96 %    Physical Exam  Constitutional:       Appearance: She is not ill-appearing.   HENT:      Head: Normocephalic and atraumatic.   Eyes:      General: No scleral icterus.  Cardiovascular:      Rate and Rhythm: Normal rate.   Pulmonary:      Effort: No respiratory distress.      Breath sounds: No wheezing or rales.   Abdominal:      General: There is no distension.      Tenderness: There is no abdominal tenderness. There is no guarding.   Musculoskeletal:         General: No swelling.   Skin:     Coloration: Skin is not jaundiced.      Findings: No bruising.   Neurological:      General: No focal deficit present.      Mental Status: She is alert and oriented to person, place, and time. Mental status is at baseline.      Cranial Nerves: No cranial nerve deficit.      Motor: No weakness.   Psychiatric:         Mood and Affect: Mood normal.         Thought Content: Thought content normal.         Fluids    Intake/Output Summary (Last 24 hours) at 12/9/2021 1152  Last data filed at 12/9/2021 0900  Gross per 24 hour   Intake 520 ml   Output --   Net 520 ml       Laboratory  Recent Labs     12/07/21  1724 12/09/21  0248   WBC 21.5* 14.1*   RBC 3.68* 3.70*   HEMOGLOBIN 11.7* 12.0   HEMATOCRIT 35.5* 36.4*   MCV 96.5 98.4*   MCH 31.8 32.4   MCHC 33.0* 33.0*   RDW 42.1 42.3   PLATELETCT 592* 536*   MPV 8.5* 8.6*     Recent Labs     12/07/21  1724 12/09/21  0248   SODIUM 134* 136   POTASSIUM 4.0 3.6   CHLORIDE 99 99   CO2 24 27   GLUCOSE 98 98   BUN 14 14   CREATININE 0.75 0.72   CALCIUM 8.3* 9.1                   Imaging  CT-CTA CHEST PULMONARY ARTERY W/ RECONS   Final Result         1.  No pulmonary embolus appreciated.   2.  Bronchiectasis and scattered reticular nodular opacities, increased in extent but  similar in character to prior study. Appearance favors chronic changes. No focal infiltrate is appreciated.   3.  Atherosclerosis and atherosclerotic coronary artery disease.      DX-CHEST-PORTABLE (1 VIEW)   Final Result         1.  Interstitial pulmonary parenchymal prominence suggest chronic underlying lung disease, component of interstitial edema and/or infiltrates not excluded.   2.  Ill-defined nodular density right apex, less well pronounced compared to prior study, previous recommendation for CT chest is noted, additional workup as clinically appropriate.   3.  Atherosclerosis           Assessment/Plan  Bronchiectasis with acute exacerbation (HCC)  Assessment & Plan  Pulmonary function testing 2018 showed severe obstructive disease   patient has history of bronchiectasis and on inhalers   CT scan did not show significant changes or worsening  Home oxygen eval: Patient needs oxygen therapy on exertion 1 L  Home oxygen was ordered  Follow-up with PCP and pulmonary clinic    Neutrophilic leukocytosis  Assessment & Plan  Elevated compared to baseline, however suspect that this could be reactive  Normal procalcitonin no fever  Patient is on azithromycin as outpatient  Blood cultures is negative till now        Acute hypoxemic respiratory failure (HCC)  Assessment & Plan  Improving  Home oxygen eval patient needs oxygen on exertion 1 L only  Home oxygen was ordered  Suspect that this is due to patient running out of anoro, resume anoro  Pulmonology consult in a.m.       VTE prophylaxis: SCDs/TEDs and enoxaparin ppx    I have performed a physical exam and reviewed and updated ROS and Plan today (12/9/2021). In review of yesterday's note (12/8/2021), there are no changes except as documented above.

## 2021-12-09 NOTE — PROGRESS NOTES
Pt admitted to room S632-2 via transport in [facility] from   Oriented to room call light and safety precautions. Reviewed plan of care (equipment,medications, activity, diet, fall precautions, skin care, and pain) with patient. Education provided on oral hygiene program. Pt transferred sba to bed.   On 1L per NC.  placed at bedside.

## 2021-12-09 NOTE — PROGRESS NOTES
Assumed care of patient at bedside report from DAY RN. Updated on POC. Patient currently A & O x 4; on 1 L O2 NC; up SBA; without complaints of acute pain. Call light within reach. Whiteboard updated. Fall precautions in place. Bed locked and in lowest position. All questions answered. No other needs indicated at this time.

## 2021-12-09 NOTE — HOSPITAL COURSE
81-year-old with history of bronchiectasis presented 12/9 with worsening shortness of breath and dry coughing patient has interstitial lung disease/bronchiectasis and followed by pulmonary clinic, patient is on room air at home, denies fever all chills denies significant chest pain and no lower extremity edema, on admission labs showed leukocytosis 14, with normal kidney function, procalcitonin was normal and blood culture and COVID-19 were negative, CTA for chest did not show PE and no significant changes on her bronchiectasis, patient was admitted for acute respiratory failure and she was treated with oxygen only, continue home inhalers and azithromycin

## 2021-12-09 NOTE — DISCHARGE PLANNING
Received Choice form at 0677  Agency/Facility Name: [facility]  Referral sent per Choice form @ [phone]

## 2021-12-09 NOTE — ASSESSMENT & PLAN NOTE
Pulmonary function testing 2018 showed severe obstructive disease   patient has history of bronchiectasis and on inhalers   CT scan did not show significant changes or worsening  Home oxygen eval: Patient needs oxygen therapy on exertion 1 L  Home oxygen was ordered  Follow-up with PCP and pulmonary clinic

## 2021-12-09 NOTE — PROGRESS NOTES
4 Eyes Skin Assessment Completed by Lien, RN and NOELLE Weiner.    Head WDL  Ears Blanching  Nose WDL  Mouth WDL  Neck WDL  Breast/Chest WDL  Shoulder Blades WDL  Spine WDL  (R) Arm/Elbow/Hand WDL  (L) Arm/Elbow/Hand WDL  Abdomen WDL  Groin WDL  Scrotum/Coccyx/Buttocks WDL  (R) Leg WDL  (L) Leg WDL  (R) Heel/Foot/Toe Blanching  (L) Heel/Foot/Toe WDL          Devices In Places Pulse Ox and Nasal Cannula      Interventions In Place N/A    Possible Skin Injury No    Pictures Uploaded Into Epic N/A  Wound Consult Placed N/A  RN Wound Prevention Protocol Ordered No

## 2021-12-09 NOTE — DISCHARGE PLANNING
Anticipated Discharge Disposition: Home with home O2     Action: LSW met with pt at bedside to collect choice for home O2. Pt stated she has never had home O2 prior. LSW collected choice for [facility], faxed to STANISLAW Curtis.     Pt stated her daughter is unable to pick her up because her grandson is having knee surgery today and that tomorrow would be better. LSW asked if anyone else could pick her up, pt stated no. LSW placed call to pt's daughter, stated she could  pt this evening, LSW notified bedside RN.     Barriers to Discharge: Home O2 delivery    Plan: LSW to assist as needed

## 2021-12-09 NOTE — DISCHARGE SUMMARY
Discharge Summary    CHIEF COMPLAINT ON ADMISSION  Chief Complaint   Patient presents with   • Shortness of Breath   • Cough       Reason for Admission  Acute respiratory failure due to bronchiectasis exacerbation    Admission Date  12/7/2021    CODE STATUS  Full Code    HPI & HOSPITAL COURSE    81-year-old with history of bronchiectasis presented 12/9 with worsening shortness of breath and dry coughing patient has interstitial lung disease/bronchiectasis and followed by pulmonary clinic, patient is on room air at home, denies fever all chills denies significant chest pain and no lower extremity edema, on admission labs showed leukocytosis 14, with normal kidney function, procalcitonin was normal and blood culture and COVID-19 were negative, CTA for chest did not show PE and no significant changes on her bronchiectasis, patient was admitted for acute respiratory failure and she was treated with oxygen only, no antibiotics were needed, patient felt better, home oxygen eval showed patient needs 1 L on exertion, patient will be discharged on a stable situation with home oxygen.  The case was discussed in detail with the patient and her daughter, discussed the importance of following with pulmonary clinic and using oxygen, answered all their question, they understood and agreed.  Patient has history of KAVITHA Mycobacterium avium intracellular infection and she is on antibiotics by her pulmonologist to follow-up.    Therefore, she is discharged in good and stable condition to home with close outpatient follow-up.    The patient met 2-midnight criteria for an inpatient stay at the time of discharge.    Discharge Date  12/9/21      FOLLOW UP ITEMS POST DISCHARGE  Follow-up with pulmonologist for bronchiectasia and chronic respiratory failure and repeat pulmonary function test, referral was placed.    DISCHARGE DIAGNOSES  Principal Problem:    Acute respiratory failure with hypoxemia (HCC) POA: Yes  Active Problems:     Bronchiectasis with acute exacerbation (HCC) POA: Unknown    Neutrophilic leukocytosis POA: Unknown  Resolved Problems:    Acute hypoxemic respiratory failure (HCC) POA: Unknown      FOLLOW UP  Poppy Freeman M.D.  [address]  [place] [address] [phone]    In 1 week        MEDICATIONS ON DISCHARGE     Medication List      CONTINUE taking these medications      Instructions   Anoro Ellipta 62.5-25 MCG/INH Aepb inhaler  Generic drug: umeclidinium-vilanterol   Inhale 1 Puff every day.  Dose: 1 Puff     azithromycin 250 MG Tabs  Commonly known as: ZITHROMAX   Take 1 tablet by mouth every Monday, Wednesday, and Friday.  Dose: 250 mg     ethambutol 400 MG Tabs  Commonly known as: MYAMBUTOL   Doctor's comments: Please dispense generic ethambutol per insurance preference.  Take 2 tablets by mouth 3 times a week; Tuesday, Thursday, and Saturday     hydrocortisone 1 % Oint   Apply 2 g topically as needed (itching).  Dose: 2 g     riFAMPin 300 MG Caps  Commonly known as: RIFADINE   Take 1 cap by mouth 3 times a week (Monday, Wednesday and Friday)            Allergies  Allergies   Allergen Reactions   • Chocolate Shortness of Breath   • Chocolate Flavor Shortness of Breath       DIET  Orders Placed This Encounter   Procedures   • Diet Order Diet: Regular     Standing Status:   Standing     Number of Occurrences:   1     Order Specific Question:   Diet:     Answer:   Regular [1]       ACTIVITY  As tolerated.  Weight bearing as tolerated    CONSULTATIONS  None    PROCEDURES  None    LABORATORY  Lab Results   Component Value Date    SODIUM 136 12/09/2021    POTASSIUM 3.6 12/09/2021    CHLORIDE 99 12/09/2021    CO2 27 12/09/2021    GLUCOSE 98 12/09/2021    BUN 14 12/09/2021    CREATININE 0.72 12/09/2021        Lab Results   Component Value Date    WBC 14.1 (H) 12/09/2021    HEMOGLOBIN 12.0 12/09/2021    HEMATOCRIT 36.4 (L) 12/09/2021    PLATELETCT 536 (H) 12/09/2021        Total time of the discharge process exceeds  34 minutes.

## 2021-12-09 NOTE — FACE TO FACE
"Face to Face Note  -  Durable Medical Equipment    Lobo Lieberman M.D. - NPI: [id number]  I certify that this patient is under my care and that they had a durable medical equipment(DME)face to face encounter by myself that meets the physician DME face-to-face encounter requirements with this patient on:    Date of encounter:   Patient:                    MRN:                       YOB: 2019  Irina Conrad  [id number]  1940     The encounter with the patient was in whole, or in part, for the following medical condition, which is the primary reason for durable medical equipment:  Other - IPF    I certify that, based on my findings, the following durable medical equipment is medically necessary:  Oxygen.    HOME O2 Saturation Measurements:(Values must be present for Home Oxygen orders)  Room air sat at rest: 88  Room air sat with amb: 86  With liters of O2: 1, O2 sat at rest with O2: 94  With Liters of O2: 1, O2 sat with amb with O2 : 93  Is the patient mobile?: Yes    My Clinical findings support the need for the above equipment due to:  Hypoxia    Supporting Symptoms: The patient requires supplemental oxygen, as the following interventions have been tried with limited or no improvement: \"Ambulation with oximetry    If patient feels more short of breath, they can go up to 6 liters per minute and contact healthcare provider.  "

## 2021-12-09 NOTE — PROGRESS NOTES
Bedside report received at change of shift. Pt is A&Ox4, reports no pqin at this time. Pt's primary language is Tagalog, but pt was able to talk to this RN in english. Pt is on .5L of oxygen via NC. Safety precautions in place. Educated on use of call light and to call before getting up. All pt needs met at this time.

## 2021-12-09 NOTE — DISCHARGE INSTRUCTIONS
Discharge Instructions    Discharged to home by car with relative. Discharged via wheelchair, hospital escort: Yes.  Special equipment needed: Not Applicable    Be sure to schedule a follow-up appointment with your primary care doctor or any specialists as instructed.     Discharge Plan:        I understand that a diet low in cholesterol, fat, and sodium is recommended for good health. Unless I have been given specific instructions below for another diet, I accept this instruction as my diet prescription.   Other diet: regular    Special Instructions: None    · Is patient discharged on Warfarin / Coumadin?   No     Depression / Suicide Risk    As you are discharged from this [facility] facility, it is important to learn how to keep safe from harming yourself.    Recognize the warning signs:  · Abrupt changes in personality, positive or negative- including increase in energy   · Giving away possessions  · Change in eating patterns- significant weight changes-  positive or negative  · Change in sleeping patterns- unable to sleep or sleeping all the time   · Unwillingness or inability to communicate  · Depression  · Unusual sadness, discouragement and loneliness  · Talk of wanting to die  · Neglect of personal appearance   · Rebelliousness- reckless behavior  · Withdrawal from people/activities they love  · Confusion- inability to concentrate     If you or a loved one observes any of these behaviors or has concerns about self-harm, here's what you can do:  · Talk about it- your feelings and reasons for harming yourself  · Remove any means that you might use to hurt yourself (examples: pills, rope, extension cords, firearm)  · Get professional help from the community (Mental Health, Substance Abuse, psychological counseling)  · Do not be alone:Call your Safe Contact- someone whom you trust who will be there for you.  · Call your local CRISIS HOTLINE 308-2609 or 303-230-5107  · Call your local Children's Mobile Crisis  Response Team [facility] (466) 004-6138 or www.[employer].Kites  · Call the toll free National Suicide Prevention Hotlines   · National Suicide Prevention Lifeline 877-734-OUCI (8661)  · National Hope Line Network 800-SUICIDE (410-0939)

## 2021-12-10 ENCOUNTER — PATIENT OUTREACH (OUTPATIENT)
Dept: MEDICAL GROUP | Facility: PHYSICIAN GROUP | Age: 81
End: 2021-12-10

## 2021-12-10 NOTE — PROGRESS NOTES
Patient outreach for TCM, daughter's phone only contact number.  Reviewed discharge instructions and medications.  Verbalized understanding.  Daughter stated she will make the follow up appointment with PCP.

## 2021-12-10 NOTE — PROGRESS NOTES
Pt dc'd home. IV's removed. Pt left unit via wheelchair with family. Personal belongings with pt when leaving unit. Pt given discharge instructions prior to leaving unit including prescriptions and when to visit with physician; verbalizes understanding. Oxygen delivered to bedside. Copy of discharge instructions with pt and in the chart.

## 2021-12-13 ENCOUNTER — PATIENT OUTREACH (OUTPATIENT)
Dept: HEALTH INFORMATION MANAGEMENT | Facility: OTHER | Age: 81
End: 2021-12-13

## 2021-12-13 NOTE — PROGRESS NOTES
CHW Geoffrey reached out to pt via TC to f/u after d/c and introduce CCM services. Spoke with pt's daughter, Toña, who said pt has been doing well after d/c and has a f/u with her PCP already. Toña declined CCM services at this time and states that her mother has everything covered for now. CCM number provided in case that changes. CHW will d/c from services.

## 2022-01-11 ENCOUNTER — OFFICE VISIT (OUTPATIENT)
Dept: MEDICAL GROUP | Facility: PHYSICIAN GROUP | Age: 82
End: 2022-01-11
Payer: MEDICARE

## 2022-01-11 VITALS
TEMPERATURE: 97.2 F | BODY MASS INDEX: 20.22 KG/M2 | OXYGEN SATURATION: 95 % | WEIGHT: 96.34 LBS | HEIGHT: 58 IN | HEART RATE: 76 BPM | DIASTOLIC BLOOD PRESSURE: 60 MMHG | SYSTOLIC BLOOD PRESSURE: 150 MMHG

## 2022-01-11 DIAGNOSIS — J96.01 ACUTE RESPIRATORY FAILURE WITH HYPOXIA (HCC): ICD-10-CM

## 2022-01-11 DIAGNOSIS — A31.0 MAI (MYCOBACTERIUM AVIUM-INTRACELLULARE) INFECTION (HCC): ICD-10-CM

## 2022-01-11 DIAGNOSIS — R03.0 ELEVATED BLOOD PRESSURE READING: ICD-10-CM

## 2022-01-11 DIAGNOSIS — J47.9 BRONCHIECTASIS WITHOUT COMPLICATION (HCC): ICD-10-CM

## 2022-01-11 DIAGNOSIS — Z23 NEED FOR IMMUNIZATION AGAINST INFLUENZA: ICD-10-CM

## 2022-01-11 PROCEDURE — 99214 OFFICE O/P EST MOD 30 MIN: CPT | Mod: 25 | Performed by: FAMILY MEDICINE

## 2022-01-11 PROCEDURE — G0008 ADMIN INFLUENZA VIRUS VAC: HCPCS | Performed by: FAMILY MEDICINE

## 2022-01-11 PROCEDURE — 90662 IIV NO PRSV INCREASED AG IM: CPT | Performed by: FAMILY MEDICINE

## 2022-01-11 ASSESSMENT — FIBROSIS 4 INDEX: FIB4 SCORE: 0.86

## 2022-01-11 ASSESSMENT — PATIENT HEALTH QUESTIONNAIRE - PHQ9: CLINICAL INTERPRETATION OF PHQ2 SCORE: 0

## 2022-01-12 NOTE — PROGRESS NOTES
Subjective     Irina Conrad is a 81 y.o. female who presents with Hospital Follow-up (lung disease)            HPI     Patient returns for follow-up of her medical problems, recent hospitalization.  She is accompanied by her daughter.    She was hospitalized at [facility] from 12/7-9/2021 for shortness of breath and dry cough.  She was ruled out for COVID-19 infection, CTA of the chest did not show pulmonary embolism and showed increase in bronchiectasis extent but similar character.  Her sputum Gram stain came back quality score less than 1 probably oropharyngeal contamination and so CNS was not performed.  She was not given any antibiotics as there was no sign of infection.  Blood culture came back no growth.  She was found to be hypoxic and required oxygen supplementation.  She was discharged home on 1 L of oxygen to be used on exertion.  According to the daughter, she has been using it only as needed when she gets short of breath with exertion such as walking.  She has concentrator at home and a portable tank.  Per patient she has increase in cough and increase in sputum production in the last few weeks.  Sputum color is clear to grayish.    She continues to take her medications for KAVITHA which are azithromycin, ethambutol and rifampin.  She is on Trelegy inhaler for her bronchiectasis.  She will see her pulmonary specialist on 2/2/2022.    Today her blood pressure is elevated but she has no history of hypertension.  Denies any headache, dizziness, lightheadedness, chest pain, palpitations, leg edema.    She needs flu shot today.  She also needs her COVID-19 booster shot which she will get from the pharmacy.    Past medical history, past surgical history, family history reviewed-no changes    Social history reviewed-no changes    Allergies reviewed-no changes    Medications reviewed-no changes    ROS     As per HPI, the rest are negative.           Objective     /60 (BP Location: Left  "arm, Patient Position: Sitting, BP Cuff Size: Adult)   Pulse 76   Temp 36.2 °C (97.2 °F) (Temporal)   Ht 1.473 m (4' 10\")   Wt 43.7 kg (96 lb 5.5 oz)   SpO2 95%   BMI 20.14 kg/m²      Physical Exam     Examined alert, awake, oriented, not in distress    Neck-supple, no lymphadenopathy, no thyromegaly  Lungs-clear to auscultation, minimal coarse rales all lung fields which is an old finding, good air exchange  Heart-regular rate and rhythm, no murmur  Extremities-no edema, clubbing, cyanosis          I reviewed hospital records             Assessment & Plan        1. Acute respiratory failure with hypoxia (HCC)  Recent hospitalization a month ago for shortness of breath and dry cough.  She was found to have hypoxia.  She was discharged home on oxygen to be used on exertion.  Patient did not need any oxygen prior to the hospitalization.  She may have progression of her bronchiectasis now requiring oxygen.  Her CTA of the chest showed increase in the bronchiectasis extent but with similar alcoholic therapy she was ruled out for PE in the hospital.  No evidence of acute lung infection.  Advised to keep appointment with pulmonary specialist.  Continue Trelegy inhaler.    2. KAVITHA (mycobacterium avium-intracellulare) infection (HCC)  Patient would increase in cough and sputum production recently.  Continue triple antibiotics.  Keep follow-up with pulmonary specialist.  She may need to have updated sputum exam for AFB.    3. Bronchiectasis without complication (HCC)  There seems to be increase in her bronchiectasis extent per CTA of the chest.  This is probably progression of her disease causing the respiratory failure.  Keep appointment with pulmonary specialist.  Continue Trelegy inhaler.    4. Elevated blood pressure reading  No prior elevation of blood pressure.  Advised to monitor blood pressure at home and keep a record.  Advised to avoid salty foods.  I will reevaluate in 2 months.    5. Need for immunization " against influenza  High-dose flu shot was given.  - INFLUENZA VACCINE, HIGH DOSE (65+ ONLY)    Follow-up in 2 months.      Please note that this dictation was created using voice recognition software. I have worked with consultants from the vendor as well as technical experts from [facility] to optimize the interface. I have made every reasonable attempt to correct obvious errors, but I expect that there are errors of grammar and possibly content I did not discover before finalizing the note.

## 2022-02-02 ENCOUNTER — OFFICE VISIT (OUTPATIENT)
Dept: SLEEP MEDICINE | Facility: MEDICAL CENTER | Age: 82
End: 2022-02-02
Payer: MEDICARE

## 2022-02-02 VITALS
HEIGHT: 58 IN | RESPIRATION RATE: 16 BRPM | SYSTOLIC BLOOD PRESSURE: 120 MMHG | DIASTOLIC BLOOD PRESSURE: 70 MMHG | WEIGHT: 97.7 LBS | HEART RATE: 77 BPM | BODY MASS INDEX: 20.51 KG/M2 | OXYGEN SATURATION: 93 %

## 2022-02-02 DIAGNOSIS — J47.9 BRONCHIECTASIS WITHOUT COMPLICATION (HCC): ICD-10-CM

## 2022-02-02 DIAGNOSIS — R06.02 SOB (SHORTNESS OF BREATH): ICD-10-CM

## 2022-02-02 DIAGNOSIS — A31.0 MAI (MYCOBACTERIUM AVIUM-INTRACELLULARE) (HCC): ICD-10-CM

## 2022-02-02 PROCEDURE — 99214 OFFICE O/P EST MOD 30 MIN: CPT | Performed by: INTERNAL MEDICINE

## 2022-02-02 PROCEDURE — 94761 N-INVAS EAR/PLS OXIMETRY MLT: CPT | Performed by: INTERNAL MEDICINE

## 2022-02-02 ASSESSMENT — FIBROSIS 4 INDEX: FIB4 SCORE: 0.86

## 2022-02-02 NOTE — PROCEDURES
Multi-Ox Readings  Multi Ox #1 Room air   O2 sat % at rest 96   O2 sat % on exertion 96   O2 sat average on exertion     Multi Ox #2     O2 sat % at rest     O2 sat % on exertion     O2 sat average on exertion       Oxygen Use     Oxygen Frequency     Duration of need     Is the patient mobile within the home?     CPAP Use?     BIPAP Use?     Servo Titration

## 2022-02-03 ENCOUNTER — HOSPITAL ENCOUNTER (OUTPATIENT)
Facility: MEDICAL CENTER | Age: 82
End: 2022-02-03
Attending: INTERNAL MEDICINE
Payer: MEDICARE

## 2022-02-03 DIAGNOSIS — R06.02 SOB (SHORTNESS OF BREATH): ICD-10-CM

## 2022-02-03 DIAGNOSIS — A31.0 MAI (MYCOBACTERIUM AVIUM-INTRACELLULARE) (HCC): ICD-10-CM

## 2022-02-03 DIAGNOSIS — J47.9 BRONCHIECTASIS WITHOUT COMPLICATION (HCC): ICD-10-CM

## 2022-02-03 LAB
GRAM STN SPEC: NORMAL
SIGNIFICANT IND 70042: NORMAL
SITE SITE: NORMAL
SOURCE SOURCE: NORMAL

## 2022-02-03 PROCEDURE — 87070 CULTURE OTHR SPECIMN AEROBIC: CPT

## 2022-02-03 PROCEDURE — 87116 MYCOBACTERIA CULTURE: CPT

## 2022-02-03 PROCEDURE — 87205 SMEAR GRAM STAIN: CPT

## 2022-02-03 NOTE — PROGRESS NOTES
Chief Complaint   Patient presents with   • Follow-Up     bronchiectasis       HPI:    The patient is a 81-year-old woman who was born in the [facility].  She again presents with her daughter today.  She has a CT scan showing diffuse scarring and bronchiectasis with cavitation.  Her sputum has grown KAVITHA on multiple occasions.  We have started her on therapy with azithromycin 250 mg, rifampin 300 mg, and ethambutol 800 mg Monday Wednesday and Friday.  She has taken the medication since that time but with some significant gaps in treatment.  Her most recent sputa in January 2020 were smear and culture negative.  Her liver function studies have remained normal.  She is not having any visual or hearing problems.  She is not having any GI problems tolerating the medication.  Since starting the medication her cough has improved.  Several months ago she had some left-sided chest wall pain.  This has resolved.    The patient was hospitalized in December with an exacerbation that caused oxygen desaturation.  She was treated with supplemental oxygen but not with any antibiotics.  She is feeling much better.  Oxygen saturation today in the office showed a resting oxygen saturation of 96% with no evidence of desaturation on walking.  Past Medical History:   Diagnosis Date   • Bronchiectasis (HCC)    • Chronic cough     worse since Sept 2017   • Falls    • Knee pain, right        ROS:   Constitutional: Denies fevers, chills, night sweats, fatigue or weight loss  Eyes: Denies vision loss, pain, drainage, double vision  Ears, Nose, Throat: Denies earache, tinnitus, hoarseness  Cardiovascular: Denies chest pain, tightness, palpitations  Respiratory: See HPI  Sleep: Denies, snoring, apnea  GI: Denies abdominal pain, nausea, vomiting, diarrhea  : Denies frequent urination, hematuria, painful urination  Musculoskeletal: Denies back pain, painful joints, sore muscles  Neurological: Denies headaches, seizures  Skin: Denies rashes,  color changes  Psychiatric: Denies depression or thoughts of suicide  Hematologic: Denies bleeding tendency or clotting tendency  Allergic/Immunologic: Denies rhinitis, skin sensitivity    Social History     Socioeconomic History   • Marital status:      Spouse name: Not on file   • Number of children: Not on file   • Years of education: Not on file   • Highest education level: Not on file   Occupational History   • Occupation: cafe in [employer]   • Occupation: originally from the [employer]     Comment: here since 2017   Tobacco Use   • Smoking status: Former Smoker     Packs/day: 0.00     Quit date: 1986     Years since quittin.2   • Smokeless tobacco: Never Used   • Tobacco comment: smoked during first trimester of pregnancy   Vaping Use   • Vaping Use: Never used   Substance and Sexual Activity   • Alcohol use: Not Currently   • Drug use: No   • Sexual activity: Not Currently   Other Topics Concern   • Not on file   Social History Narrative   • Not on file     Social Determinants of Health     Financial Resource Strain:    • Difficulty of Paying Living Expenses: Not on file   Food Insecurity:    • Worried About Running Out of Food in the Last Year: Not on file   • Ran Out of Food in the Last Year: Not on file   Transportation Needs:    • Lack of Transportation (Medical): Not on file   • Lack of Transportation (Non-Medical): Not on file   Physical Activity:    • Days of Exercise per Week: Not on file   • Minutes of Exercise per Session: Not on file   Stress:    • Feeling of Stress : Not on file   Social Connections:    • Frequency of Communication with Friends and Family: Not on file   • Frequency of Social Gatherings with Friends and Family: Not on file   • Attends Yazidi Services: Not on file   • Active Member of Clubs or Organizations: Not on file   • Attends Club or Organization Meetings: Not on file   • Marital Status: Not on file   Intimate Partner Violence:    • Fear of  "Current or Ex-Partner: Not on file   • Emotionally Abused: Not on file   • Physically Abused: Not on file   • Sexually Abused: Not on file   Housing Stability:    • Unable to Pay for Housing in the Last Year: Not on file   • Number of Places Lived in the Last Year: Not on file   • Unstable Housing in the Last Year: Not on file     Chocolate and Chocolate flavor  Current Outpatient Medications on File Prior to Visit   Medication Sig Dispense Refill   • ethambutol (MYAMBUTOL) 400 MG Tab Take 2 tablets by mouth 3 times a week; Tuesday, Thursday, and Saturday 80 tablet 3   • umeclidinium-vilanterol (ANORO ELLIPTA) 62.5-25 MCG/INH AEROSOL POWDER, BREATH ACTIVATED inhaler Inhale 1 Puff every day. 1 Each 11   • azithromycin (ZITHROMAX) 250 MG Tab Take 1 tablet by mouth every Monday, Wednesday, and Friday. 40 tablet 3   • riFAMPin (RIFADINE) 300 MG Cap Take 1 cap by mouth 3 times a week (Monday, Wednesday and Friday) 40 Cap 3     No current facility-administered medications on file prior to visit.     /70 (BP Location: Right arm, Patient Position: Sitting, BP Cuff Size: Adult)   Pulse 77   Resp 16   Ht 1.473 m (4' 10\")   Wt 44.3 kg (97 lb 11.2 oz)   SpO2 93%   Family History   Problem Relation Age of Onset   • Asthma Father    • No Known Problems Sister        Physical Exam:    HEENT: PERRLA, EOMI, no scleral icterus, no nasal or oral lesions  Neck: No thyromegaly, no adenopathy, no bruits  Mallampatti: Grade II  Lungs: Coarse breath sounds bilaterally  Heart: Regular rate and rhythm, no gallops or murmurs  Abdomen: Soft, benign, no organomegaly  Extremities: No clubbing, cyanosis, or edema  Neurologic: Cranial nerve, motor, and sensory exam are normal    1. SOB (shortness of breath)    2. KAVITHA (mycobacterium avium-intracellulare) (HCC)    3. Bronchiectasis without complication (HCC)      She has not used her oxygen in several weeks and her saturations are excellent in the office today.  We will discontinue her " supplemental oxygen.  We will check sputum for AFB x3 as well as a sputum Gram stain C&S  If her sputum is negative for KAVITHA we may consider discontinuing her medications.  We will call her with results of her sputum cultures.  She may be spending some time in the [facility].  We will see her upon her return.

## 2022-02-04 ENCOUNTER — HOSPITAL ENCOUNTER (OUTPATIENT)
Facility: MEDICAL CENTER | Age: 82
End: 2022-02-04
Attending: INTERNAL MEDICINE
Payer: MEDICARE

## 2022-02-04 DIAGNOSIS — A31.0 MAI (MYCOBACTERIUM AVIUM-INTRACELLULARE) (HCC): ICD-10-CM

## 2022-02-04 DIAGNOSIS — J47.9 BRONCHIECTASIS WITHOUT COMPLICATION (HCC): ICD-10-CM

## 2022-02-04 DIAGNOSIS — R06.02 SOB (SHORTNESS OF BREATH): ICD-10-CM

## 2022-02-04 PROCEDURE — 87116 MYCOBACTERIA CULTURE: CPT

## 2022-02-05 ENCOUNTER — HOSPITAL ENCOUNTER (OUTPATIENT)
Facility: MEDICAL CENTER | Age: 82
End: 2022-02-05
Attending: INTERNAL MEDICINE
Payer: MEDICARE

## 2022-02-05 DIAGNOSIS — A31.0 MAI (MYCOBACTERIUM AVIUM-INTRACELLULARE) (HCC): ICD-10-CM

## 2022-02-05 DIAGNOSIS — R06.02 SOB (SHORTNESS OF BREATH): ICD-10-CM

## 2022-02-05 DIAGNOSIS — J47.9 BRONCHIECTASIS WITHOUT COMPLICATION (HCC): ICD-10-CM

## 2022-02-05 LAB
BACTERIA SPEC RESP CULT: NORMAL
GRAM STN SPEC: NORMAL
SIGNIFICANT IND 70042: NORMAL
SITE SITE: NORMAL
SOURCE SOURCE: NORMAL

## 2022-02-05 PROCEDURE — 87116 MYCOBACTERIA CULTURE: CPT

## 2022-02-07 LAB
MISC LAB RSLT 95002: NORMAL
TEST NAME 95000: NORMAL

## 2022-02-16 LAB
FINAL REPORT Q0603: NORMAL
FINAL REPORT Q0603: NORMAL
PRELIMINARY RPT Q0601: NORMAL
PRELIMINARY RPT Q0601: NORMAL
RHODAMINE-AURAMINE STN SPEC: NORMAL
RHODAMINE-AURAMINE STN SPEC: NORMAL

## 2022-02-21 LAB
FINAL REPORT Q0603: NORMAL
PRELIMINARY RPT Q0601: NORMAL
RHODAMINE-AURAMINE STN SPEC: NORMAL

## 2022-03-10 ENCOUNTER — OFFICE VISIT (OUTPATIENT)
Dept: MEDICAL GROUP | Facility: PHYSICIAN GROUP | Age: 82
End: 2022-03-10
Payer: MEDICARE

## 2022-03-10 VITALS
WEIGHT: 96.78 LBS | DIASTOLIC BLOOD PRESSURE: 70 MMHG | SYSTOLIC BLOOD PRESSURE: 120 MMHG | HEART RATE: 74 BPM | HEIGHT: 58 IN | BODY MASS INDEX: 20.32 KG/M2 | OXYGEN SATURATION: 94 % | TEMPERATURE: 97.2 F

## 2022-03-10 DIAGNOSIS — H61.23 IMPACTED CERUMEN OF BOTH EARS: ICD-10-CM

## 2022-03-10 DIAGNOSIS — J47.9 BRONCHIECTASIS WITHOUT COMPLICATION (HCC): ICD-10-CM

## 2022-03-10 DIAGNOSIS — R03.0 ELEVATED BLOOD PRESSURE READING: ICD-10-CM

## 2022-03-10 DIAGNOSIS — R42 DIZZINESS: ICD-10-CM

## 2022-03-10 PROCEDURE — 69210 REMOVE IMPACTED EAR WAX UNI: CPT | Performed by: FAMILY MEDICINE

## 2022-03-10 PROCEDURE — 99214 OFFICE O/P EST MOD 30 MIN: CPT | Mod: 25 | Performed by: FAMILY MEDICINE

## 2022-03-10 ASSESSMENT — FIBROSIS 4 INDEX: FIB4 SCORE: 0.86

## 2022-03-10 NOTE — PROGRESS NOTES
"Subjective     Irina Conrad is a 81 y.o. female who presents with Hypertension Follow-up            HPI     Patient returns for follow-up accompanied by her daughter.    I had her return in 2 months for follow-up on her blood pressure.  When I saw her 2 months ago blood pressure was elevated at 150/60 with no prior history of hypertension.  Today the blood pressure is back to normal.    She saw her pulmonary specialist Dr. Collazo month ago and her oxygen was discontinued as her pulse ox on room air has been good without the oxygen.  She was sent for another set of sputum Gram stain and culture as well as AFB culture.  Dr. Collazo planned to stop the antibiotics if her AFB culture comes back negative.  The Gram stain showed mixed bacteria with no predominant organism with culture showing heavy growth of usual upper respiratory janice.  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  Patient she still has occasional cough with small amount of phlegm.  Denies any shortness of breath.  She uses her inhaler regularly.    She complains of dizziness only when lying down.  This has been for 3 days.  No dizziness when she is sitting or standing or walking.  No prior episodes of dizziness like this.    Patient is going back to the [facility] in May 2022.  She is not sure if she will come back here in the .      Past medical history, past surgical history, family history reviewed-no changes    Social history reviewed-no changes    Allergies reviewed-no changes    Medications reviewed-no changes        ROS     As per HPI, the rest are negative.           Objective     /70 (BP Location: Left arm, Patient Position: Sitting, BP Cuff Size: Adult)   Pulse 74   Temp 36.2 °C (97.2 °F) (Temporal)   Ht 1.473 m (4' 10\")   Wt 43.9 kg (96 lb 12.5 oz)   SpO2 94%   BMI 20.23 kg/m²      Physical Exam     Examined alert, awake, oriented, not in distress    Eyes-positive PERRLA, EOMs " intact, no nystagmus  Ears-tympanic membrane partially visible because of cerumen partially blocking the canals  Neck-supple, no lymphadenopathy, no thyromegaly  Lungs-minimal crackles right lung field which has significantly improved compared to previous exams.  No wheezes, good air exchange  Heart-regular rate and rhythm, no murmur  Extremities-no edema, clubbing, cyanosis                        Assessment & Plan        1. Elevated blood pressure reading  This is back to normal.  No treatment needed.    2. Bronchiectasis without complication (HCC)  Due to KAVITHA infection.  Unfortunately her most recent AFB culture came back positive for mycobacteria as per above.  She will need to stay on her ethambutol, rifampin and azithromycin    3. Dizziness  Most likely benign positional vertigo.  We discussed Epley maneuver.    4. Impacted cerumen of both ears  Cerumen removed by scooping them out using cerumen curette with good success.  Reexamination showed intact tympanic membranes bilaterally.            Return when she comes back to the [place] later this year.      Please note that this dictation was created using voice recognition software. I have worked with consultants from the vendor as well as technical experts from [employer] to optimize the interface. I have made every reasonable attempt to correct obvious errors, but I expect that there are errors of grammar and possibly content I did not discover before finalizing the note.

## 2022-03-18 DIAGNOSIS — A31.0 MAI (MYCOBACTERIUM AVIUM-INTRACELLULARE) INFECTION (HCC): ICD-10-CM

## 2022-03-18 RX ORDER — RIFAMPIN 300 MG/1
CAPSULE ORAL
Qty: 40 CAPSULE | Refills: 0 | Status: SHIPPED | OUTPATIENT
Start: 2022-03-18

## 2022-04-06 ENCOUNTER — APPOINTMENT (OUTPATIENT)
Dept: SLEEP MEDICINE | Facility: MEDICAL CENTER | Age: 82
End: 2022-04-06
Payer: MEDICARE

## 2022-04-07 ENCOUNTER — APPOINTMENT (OUTPATIENT)
Dept: SLEEP MEDICINE | Facility: MEDICAL CENTER | Age: 82
End: 2022-04-07
Payer: MEDICARE

## 2022-04-12 ENCOUNTER — TELEPHONE (OUTPATIENT)
Dept: SLEEP MEDICINE | Facility: MEDICAL CENTER | Age: 82
End: 2022-04-12
Payer: MEDICARE

## 2022-04-12 NOTE — TELEPHONE ENCOUNTER
LVM for daughter to c/b so we can schedule pt with Dr Zay PLUMMER clinic 6/9/22 for the am, times available are 8:40a, 9:20a, 10a and 10:40a

## 2022-04-13 NOTE — PROGRESS NOTES
"Pulmonary Clinic Note    Date of Visit: 4/15/2022     Chief Complaint:  Chief Complaint   Patient presents with   • Follow-Up     Last ov 2/2/22  Pos AFB culture     HPI:   Irina Conrad is a very pleasant 81 y.o. year old female former smoker (quit in 1986), with a PMHx of KAVITHA and bronchiectais who presented to the Pulmonary Clinic for a regular follow up. Last seen in the office on 2/2/2022 with Dr. Collazo.     Per Dr. Collazo's HPI:  \"The patient is a 81-year-old woman who was born in the [facility].  She again presents with her daughter today.  She has a CT scan showing diffuse scarring and bronchiectasis with cavitation.  Her sputum has grown KAVITHA on multiple occasions.  We have started her on therapy with azithromycin 250 mg, rifampin 300 mg, and ethambutol 800 mg Monday Wednesday and Friday.  She has taken the medication since that time but with some significant gaps in treatment.  Her most recent sputa in January 2020 were smear and culture negative.  Her liver function studies have remained normal.  She is not having any visual or hearing problems.  She is not having any GI problems tolerating the medication.  Since starting the medication her cough has improved.  Several months ago she had some left-sided chest wall pain.  This has resolved.     The patient was hospitalized in December with an exacerbation that caused oxygen desaturation.  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     Currently on triple therapy with: rifampin, azithromycin, and ethambutol.     Today she presents with her daughter. She has not had any shortness of breath, wheezing, weight loss, fevers or fatigue. She does have an intermittent cough that produces a small amount of white/ grey sputum. She is still using Anoro and has a good effect. She is about to go to the [facility] for 6 months to see her family.     [facility] " Grade: 0- Breathless only during strenuous exercise    Past Medical History:   Diagnosis Date   • Bronchiectasis (HCC)    • Chronic cough     worse since 2017   • Falls    • Knee pain, right      Past Surgical History:   Procedure Laterality Date   • BRONCHOSCOPY-ENDO N/A 2017    Procedure: BRONCHOSCOPY-ENDO;  Surgeon: Chriss Goldberg M.D.;  Location: [facility];  Service: Ent     Social History     Socioeconomic History   • Marital status:      Spouse name: Not on file   • Number of children: Not on file   • Years of education: Not on file   • Highest education level: Not on file   Occupational History   • Occupation: cafe in [employer]   • Occupation: originally from the [employer]     Comment: here since 2017   Tobacco Use   • Smoking status: Former Smoker     Packs/day: 0.00     Quit date: 1986     Years since quittin.4   • Smokeless tobacco: Never Used   • Tobacco comment: smoked during first trimester of pregnancy   Vaping Use   • Vaping Use: Never used   Substance and Sexual Activity   • Alcohol use: Not Currently   • Drug use: No   • Sexual activity: Not Currently   Other Topics Concern   • Not on file   Social History Narrative   • Not on file     Social Determinants of Health     Financial Resource Strain: Not on file   Food Insecurity: Not on file   Transportation Needs: Not on file   Physical Activity: Not on file   Stress: Not on file   Social Connections: Not on file   Intimate Partner Violence: Not on file   Housing Stability: Not on file        Family History   Problem Relation Age of Onset   • Asthma Father    • No Known Problems Sister      Current Outpatient Medications on File Prior to Visit   Medication Sig Dispense Refill   • riFAMPin (RIFADINE) 300 MG Cap TAKE 1 CAPSULE BY MOUTH THREE TIMES A WEEK, ON  AND FRIDAY 40 Capsule 0     No current facility-administered medications on file prior to visit.     Allergies: Chocolate and  "Chocolate flavor    ROS:   Review of Systems   Constitutional: Negative for chills, diaphoresis, fever and malaise/fatigue.   HENT: Negative for congestion and sinus pain.    Respiratory: Positive for cough (intermittent) and sputum production (white/grey and small). Negative for hemoptysis, shortness of breath and wheezing.    Cardiovascular: Negative for chest pain, palpitations and leg swelling.   Gastrointestinal: Negative for diarrhea, heartburn, nausea and vomiting.   Musculoskeletal: Negative for falls and myalgias.   Neurological: Negative for dizziness, weakness and headaches.     Vitals:  /62 (BP Location: Right arm, Patient Position: Sitting, BP Cuff Size: Adult)   Pulse 76   Ht 1.473 m (4' 10\")   Wt 42.6 kg (93 lb 14.4 oz)   SpO2 92%     Physical Exam:  Physical Exam  Constitutional:       General: She is not in acute distress.     Appearance: Normal appearance. She is not ill-appearing, toxic-appearing or diaphoretic.   Cardiovascular:      Rate and Rhythm: Normal rate and regular rhythm.      Heart sounds: No murmur heard.    No friction rub. No gallop.   Pulmonary:      Effort: No respiratory distress.      Breath sounds: Normal breath sounds. No stridor. No wheezing, rhonchi or rales.   Musculoskeletal:         General: No swelling.      Right lower leg: No edema.      Left lower leg: No edema.   Skin:     General: Skin is warm.   Neurological:      General: No focal deficit present.      Mental Status: She is alert and oriented to person, place, and time.   Psychiatric:         Mood and Affect: Mood normal.         Behavior: Behavior normal.         Thought Content: Thought content normal.         Judgment: Judgment normal.       Laboratory Data:  AFB: (2/3/2022-2/5/2022)-  Positive for Mycobacteroides (Mycobacterium) abscessus Group     Gram stain and culture: (2/3/2022)-       PFTs (Date: 3/22/2018)-    Impression:  FEV1 is 0.60 L which is 46% of predicted. FEV1 FVC ratio is 71%. MVV is " reduced at 39% of predicted.  After administration of inhaled bronchodilator the FEV1 improved to 0.65 L. This is a relative 8% improvement.  Lung volumes demonstrate mild hyperinflation with a total lung capacity of 95% predicted.  Airways resistance is increased.     Severe obstructive lung disease. The lack of severe hyperinflation may indicate a coexisting restrictive process.    CT Chest: (Date: 12/7/2021)-  Impression:  1.  Interstitial pulmonary parenchymal prominence suggest chronic underlying lung disease, component of interstitial edema and/or infiltrates not excluded.  2.  Ill-defined nodular density right apex, less well pronounced compared to prior study, previous recommendation for CT chest is noted, additional workup as clinically appropriate.  3.  Atherosclerosis      Assessment and Plan:    Problem List Items Addressed This Visit     Mycobacterium abscessus infection     CT scan showing diffuse scarring and bronchiectasis with cavitation.  Her sputum has grown KAVITHA on multiple occasions.  We have started her on therapy with azithromycin 250 mg, rifampin 300 mg, and ethambutol 800 mg Monday Wednesday and Friday. Recent AFB cultures in February 2022 grew Mycobacterium abscesses 2/3.   --Symptomatically, the patient is stable.   --Discontinued triple antibiotic therapy due to the new cultures showing Mycobacterium abscesses  --This case has been reviewed by a Infectious Disease MD and PharmD, both suggest to refer the patient to [facility] or [facility]  --Will put in a referral to [facility] and/or [facility]  --Patient and daughter are going to go to the [facility] and discuss what the patient's wishes are with the rest of her family and let our clinic know. But, a referral will be placed in case the patient would like to move forward in treatment.    --Continue Anoro         Relevant Orders    Referral to Other        Diagnostic studies has been reviewed with the patient.    Return in about 4  weeks (around 5/13/2022), or if symptoms worsen or fail to improve.     This note was generated using voice recognition software which has a chance of producing errors of grammar and possibly content.  I have made every reasonable attempt to find and correct any obvious errors, but it should be expected that some may not be found prior to finalization of this note.    Time spent in record review prior to patient arrival, reviewing results, and in face-to-face encounter totaled 39 min.  __________  MATT Diaz  Pulmonary Medicine  [facility]

## 2022-04-15 ENCOUNTER — OFFICE VISIT (OUTPATIENT)
Dept: SLEEP MEDICINE | Facility: MEDICAL CENTER | Age: 82
End: 2022-04-15
Payer: MEDICARE

## 2022-04-15 VITALS
HEIGHT: 58 IN | SYSTOLIC BLOOD PRESSURE: 110 MMHG | WEIGHT: 93.9 LBS | OXYGEN SATURATION: 92 % | DIASTOLIC BLOOD PRESSURE: 62 MMHG | BODY MASS INDEX: 19.71 KG/M2 | HEART RATE: 76 BPM

## 2022-04-15 DIAGNOSIS — A31.8 MYCOBACTERIUM ABSCESSUS INFECTION: ICD-10-CM

## 2022-04-15 PROCEDURE — 99214 OFFICE O/P EST MOD 30 MIN: CPT

## 2022-04-15 ASSESSMENT — ENCOUNTER SYMPTOMS
HEARTBURN: 0
COUGH: 1
MYALGIAS: 0
PALPITATIONS: 0
NAUSEA: 0
FALLS: 0
DIARRHEA: 0
SHORTNESS OF BREATH: 0
HEADACHES: 0
WHEEZING: 0
SINUS PAIN: 0
WEAKNESS: 0
SPUTUM PRODUCTION: 1
CHILLS: 0
FEVER: 0
VOMITING: 0
HEMOPTYSIS: 0
DIZZINESS: 0
DIAPHORESIS: 0

## 2022-04-15 ASSESSMENT — FIBROSIS 4 INDEX: FIB4 SCORE: 0.86

## 2022-04-15 NOTE — ASSESSMENT & PLAN NOTE
CT scan showing diffuse scarring and bronchiectasis with cavitation.  Her sputum has grown KAVITHA on multiple occasions.  We have started her on therapy with azithromycin 250 mg, rifampin 300 mg, and ethambutol 800 mg Monday Wednesday and Friday. Recent AFB cultures in February 2022 grew Mycobacterium abscesses 2/3.   --Symptomatically, the patient is stable.   --Discontinued triple antibiotic therapy due to the new cultures showing Mycobacterium abscesses  --This case has been reviewed by a Infectious Disease MD and PharmD, both suggest to refer the patient to [place] or [facility]  --Will put in a referral to [place] and/or [facility]  --Patient and daughter are going to go to the [facility] and discuss what the patient's wishes are with the rest of her family and let our clinic know. But, a referral will be placed in case the patient would like to move forward in treatment.    --Continue Anoro

## (undated) DEVICE — SYRINGE 6 CC 20 GA X 1 1/2 - NDL SAFETY  (50/BX)

## (undated) DEVICE — CON SEDATION/>5 YR 1ST 15 MIN

## (undated) DEVICE — CON SEDATION EA ADDL 15 MIN

## (undated) DEVICE — SYRINGE 3 CC 22 GA X 1-1/2 - NDL SAFETY (50/BX 8BX/CA)